# Patient Record
Sex: FEMALE | Race: WHITE | Employment: FULL TIME | URBAN - METROPOLITAN AREA
[De-identification: names, ages, dates, MRNs, and addresses within clinical notes are randomized per-mention and may not be internally consistent; named-entity substitution may affect disease eponyms.]

---

## 2017-01-14 ENCOUNTER — COMMUNICATION - HEALTHEAST (OUTPATIENT)
Dept: FAMILY MEDICINE | Facility: CLINIC | Age: 62
End: 2017-01-14

## 2017-01-14 DIAGNOSIS — E03.9 HYPOTHYROIDISM: ICD-10-CM

## 2017-01-14 DIAGNOSIS — N18.4 CHRONIC KIDNEY DISEASE, STAGE IV (SEVERE) (H): ICD-10-CM

## 2017-02-21 ENCOUNTER — OFFICE VISIT - HEALTHEAST (OUTPATIENT)
Dept: FAMILY MEDICINE | Facility: CLINIC | Age: 62
End: 2017-02-21

## 2017-02-21 ENCOUNTER — RECORDS - HEALTHEAST (OUTPATIENT)
Dept: ADMINISTRATIVE | Facility: OTHER | Age: 62
End: 2017-02-21

## 2017-02-21 DIAGNOSIS — Z00.00 HEALTHCARE MAINTENANCE: ICD-10-CM

## 2017-02-21 DIAGNOSIS — E78.5 HYPERLIPIDEMIA: ICD-10-CM

## 2017-02-21 DIAGNOSIS — N18.4 CHRONIC KIDNEY DISEASE, STAGE IV (SEVERE) (H): ICD-10-CM

## 2017-02-21 DIAGNOSIS — I10 ESSENTIAL HYPERTENSION: ICD-10-CM

## 2017-02-21 DIAGNOSIS — E03.9 HYPOTHYROIDISM: ICD-10-CM

## 2017-02-21 LAB
CHOLEST SERPL-MCNC: 238 MG/DL
FASTING STATUS PATIENT QL REPORTED: NO
HDLC SERPL-MCNC: 120 MG/DL
LDLC SERPL CALC-MCNC: 88 MG/DL
TRIGL SERPL-MCNC: 149 MG/DL

## 2017-02-21 ASSESSMENT — MIFFLIN-ST. JEOR: SCORE: 1148.38

## 2017-02-24 ENCOUNTER — COMMUNICATION - HEALTHEAST (OUTPATIENT)
Dept: FAMILY MEDICINE | Facility: CLINIC | Age: 62
End: 2017-02-24

## 2017-02-27 ENCOUNTER — COMMUNICATION - HEALTHEAST (OUTPATIENT)
Dept: FAMILY MEDICINE | Facility: CLINIC | Age: 62
End: 2017-02-27

## 2017-02-27 DIAGNOSIS — N18.4 CHRONIC KIDNEY DISEASE, STAGE IV (SEVERE) (H): ICD-10-CM

## 2017-02-27 DIAGNOSIS — E03.9 HYPOTHYROIDISM, UNSPECIFIED TYPE: ICD-10-CM

## 2017-03-14 ENCOUNTER — HOSPITAL ENCOUNTER (OUTPATIENT)
Dept: MAMMOGRAPHY | Facility: HOSPITAL | Age: 62
Discharge: HOME | End: 2017-03-14
Attending: FAMILY MEDICINE

## 2017-03-14 DIAGNOSIS — Z00.00 HEALTHCARE MAINTENANCE: ICD-10-CM

## 2017-03-22 ENCOUNTER — COMMUNICATION - HEALTHEAST (OUTPATIENT)
Dept: FAMILY MEDICINE | Facility: CLINIC | Age: 62
End: 2017-03-22

## 2017-04-11 ENCOUNTER — RECORDS - HEALTHEAST (OUTPATIENT)
Dept: ADMINISTRATIVE | Facility: OTHER | Age: 62
End: 2017-04-11

## 2017-04-17 ENCOUNTER — COMMUNICATION - HEALTHEAST (OUTPATIENT)
Dept: FAMILY MEDICINE | Facility: CLINIC | Age: 62
End: 2017-04-17

## 2017-04-17 DIAGNOSIS — E78.5 HYPERLIPIDEMIA: ICD-10-CM

## 2017-06-16 ENCOUNTER — COMMUNICATION - HEALTHEAST (OUTPATIENT)
Dept: FAMILY MEDICINE | Facility: CLINIC | Age: 62
End: 2017-06-16

## 2017-06-16 DIAGNOSIS — N18.4 CHRONIC KIDNEY DISEASE, STAGE IV (SEVERE) (H): ICD-10-CM

## 2017-06-16 DIAGNOSIS — E03.9 HYPOTHYROIDISM, UNSPECIFIED TYPE: ICD-10-CM

## 2017-08-29 ENCOUNTER — RECORDS - HEALTHEAST (OUTPATIENT)
Dept: ADMINISTRATIVE | Facility: OTHER | Age: 62
End: 2017-08-29

## 2017-09-17 ENCOUNTER — COMMUNICATION - HEALTHEAST (OUTPATIENT)
Dept: FAMILY MEDICINE | Facility: CLINIC | Age: 62
End: 2017-09-17

## 2017-09-17 DIAGNOSIS — N18.4 CHRONIC KIDNEY DISEASE, STAGE IV (SEVERE) (H): ICD-10-CM

## 2017-09-17 DIAGNOSIS — E03.9 HYPOTHYROIDISM, UNSPECIFIED TYPE: ICD-10-CM

## 2017-10-03 ENCOUNTER — COMMUNICATION - HEALTHEAST (OUTPATIENT)
Dept: FAMILY MEDICINE | Facility: CLINIC | Age: 62
End: 2017-10-03

## 2018-02-16 ENCOUNTER — COMMUNICATION - HEALTHEAST (OUTPATIENT)
Dept: FAMILY MEDICINE | Facility: CLINIC | Age: 63
End: 2018-02-16

## 2018-02-16 DIAGNOSIS — E03.9 HYPOTHYROIDISM, UNSPECIFIED TYPE: ICD-10-CM

## 2018-02-16 DIAGNOSIS — N18.4 CHRONIC KIDNEY DISEASE, STAGE IV (SEVERE) (H): ICD-10-CM

## 2018-03-07 ENCOUNTER — AMBULATORY - HEALTHEAST (OUTPATIENT)
Dept: LAB | Facility: CLINIC | Age: 63
End: 2018-03-07

## 2018-03-07 DIAGNOSIS — E03.9 HYPOTHYROIDISM, UNSPECIFIED TYPE: ICD-10-CM

## 2018-03-07 LAB — TSH SERPL DL<=0.005 MIU/L-ACNC: 2.24 UIU/ML (ref 0.3–5)

## 2018-03-09 ENCOUNTER — COMMUNICATION - HEALTHEAST (OUTPATIENT)
Dept: FAMILY MEDICINE | Facility: CLINIC | Age: 63
End: 2018-03-09

## 2018-03-26 ENCOUNTER — RECORDS - HEALTHEAST (OUTPATIENT)
Dept: ADMINISTRATIVE | Facility: OTHER | Age: 63
End: 2018-03-26

## 2018-04-10 ENCOUNTER — OFFICE VISIT - HEALTHEAST (OUTPATIENT)
Dept: FAMILY MEDICINE | Facility: CLINIC | Age: 63
End: 2018-04-10

## 2018-04-10 DIAGNOSIS — E78.00 PURE HYPERCHOLESTEROLEMIA: ICD-10-CM

## 2018-04-10 DIAGNOSIS — F17.200 NICOTINE DEPENDENCE: ICD-10-CM

## 2018-04-10 DIAGNOSIS — E03.9 HYPOTHYROIDISM: ICD-10-CM

## 2018-04-10 DIAGNOSIS — N18.9 CKD (CHRONIC KIDNEY DISEASE): ICD-10-CM

## 2018-04-10 DIAGNOSIS — I10 ESSENTIAL HYPERTENSION: ICD-10-CM

## 2018-04-10 ASSESSMENT — MIFFLIN-ST. JEOR: SCORE: 1186.1

## 2018-04-23 ENCOUNTER — COMMUNICATION - HEALTHEAST (OUTPATIENT)
Dept: FAMILY MEDICINE | Facility: CLINIC | Age: 63
End: 2018-04-23

## 2018-05-03 ENCOUNTER — COMMUNICATION - HEALTHEAST (OUTPATIENT)
Dept: FAMILY MEDICINE | Facility: CLINIC | Age: 63
End: 2018-05-03

## 2018-05-20 ENCOUNTER — COMMUNICATION - HEALTHEAST (OUTPATIENT)
Dept: FAMILY MEDICINE | Facility: CLINIC | Age: 63
End: 2018-05-20

## 2018-05-20 DIAGNOSIS — E03.9 HYPOTHYROIDISM, UNSPECIFIED TYPE: ICD-10-CM

## 2018-05-20 DIAGNOSIS — N18.4 CHRONIC KIDNEY DISEASE, STAGE IV (SEVERE) (H): ICD-10-CM

## 2018-07-17 ENCOUNTER — RECORDS - HEALTHEAST (OUTPATIENT)
Dept: ADMINISTRATIVE | Facility: OTHER | Age: 63
End: 2018-07-17

## 2019-02-20 ENCOUNTER — COMMUNICATION - HEALTHEAST (OUTPATIENT)
Dept: FAMILY MEDICINE | Facility: CLINIC | Age: 64
End: 2019-02-20

## 2019-03-08 ENCOUNTER — AMBULATORY - HEALTHEAST (OUTPATIENT)
Dept: SCHEDULING | Facility: CLINIC | Age: 64
End: 2019-03-08

## 2019-03-08 ENCOUNTER — OFFICE VISIT - HEALTHEAST (OUTPATIENT)
Dept: FAMILY MEDICINE | Facility: CLINIC | Age: 64
End: 2019-03-08

## 2019-03-08 DIAGNOSIS — Z00.00 ANNUAL PHYSICAL EXAM: ICD-10-CM

## 2019-03-08 DIAGNOSIS — F17.200 SMOKER: ICD-10-CM

## 2019-03-08 DIAGNOSIS — E83.52 SERUM CALCIUM ELEVATED: ICD-10-CM

## 2019-03-08 DIAGNOSIS — N18.4 CHRONIC KIDNEY DISEASE, STAGE IV (SEVERE) (H): ICD-10-CM

## 2019-03-08 DIAGNOSIS — Z12.31 VISIT FOR SCREENING MAMMOGRAM: ICD-10-CM

## 2019-03-08 DIAGNOSIS — R07.89 CHEST PAIN, MUSCULOSKELETAL: ICD-10-CM

## 2019-03-08 DIAGNOSIS — E78.5 HYPERLIPIDEMIA: ICD-10-CM

## 2019-03-08 DIAGNOSIS — E03.9 HYPOTHYROIDISM, UNSPECIFIED TYPE: ICD-10-CM

## 2019-03-08 LAB
25(OH)D3 SERPL-MCNC: 35.6 NG/ML (ref 30–80)
25(OH)D3 SERPL-MCNC: 35.6 NG/ML (ref 30–80)
ALBUMIN SERPL-MCNC: 4.2 G/DL (ref 3.5–5)
ALP SERPL-CCNC: 101 U/L (ref 45–120)
ALT SERPL W P-5'-P-CCNC: <9 U/L (ref 0–45)
ANION GAP SERPL CALCULATED.3IONS-SCNC: 12 MMOL/L (ref 5–18)
AST SERPL W P-5'-P-CCNC: 14 U/L (ref 0–40)
BILIRUB SERPL-MCNC: 0.3 MG/DL (ref 0–1)
BUN SERPL-MCNC: 37 MG/DL (ref 8–22)
CALCIUM SERPL-MCNC: 11 MG/DL (ref 8.5–10.5)
CHLORIDE BLD-SCNC: 109 MMOL/L (ref 98–107)
CHOLEST SERPL-MCNC: 332 MG/DL
CO2 SERPL-SCNC: 23 MMOL/L (ref 22–31)
CREAT SERPL-MCNC: 1.88 MG/DL (ref 0.6–1.1)
FASTING STATUS PATIENT QL REPORTED: YES
GFR SERPL CREATININE-BSD FRML MDRD: 27 ML/MIN/1.73M2
GLUCOSE BLD-MCNC: 99 MG/DL (ref 70–125)
HDLC SERPL-MCNC: 91 MG/DL
LDLC SERPL CALC-MCNC: 218 MG/DL
POTASSIUM BLD-SCNC: 5 MMOL/L (ref 3.5–5)
PROT SERPL-MCNC: 7.3 G/DL (ref 6–8)
SODIUM SERPL-SCNC: 144 MMOL/L (ref 136–145)
TRIGL SERPL-MCNC: 114 MG/DL
TSH SERPL DL<=0.005 MIU/L-ACNC: 3.44 UIU/ML (ref 0.3–5)

## 2019-03-08 ASSESSMENT — MIFFLIN-ST. JEOR: SCORE: 1270.39

## 2019-03-11 LAB
HPV SOURCE: NORMAL
HUMAN PAPILLOMA VIRUS 16 DNA: NEGATIVE
HUMAN PAPILLOMA VIRUS 18 DNA: NEGATIVE
HUMAN PAPILLOMA VIRUS FINAL DIAGNOSIS: NORMAL
HUMAN PAPILLOMA VIRUS OTHER HR: NEGATIVE
SPECIMEN DESCRIPTION: NORMAL

## 2019-03-14 LAB
BKR LAB AP ABNORMAL BLEEDING: NO
BKR LAB AP BIRTH CONTROL/HORMONES: NORMAL
BKR LAB AP CERVICAL APPEARANCE: NORMAL
BKR LAB AP GYN ADEQUACY: NORMAL
BKR LAB AP GYN INTERPRETATION: NORMAL
BKR LAB AP HPV REFLEX: NORMAL
BKR LAB AP LMP: NORMAL
BKR LAB AP PATIENT STATUS: NO
BKR LAB AP PREVIOUS ABNORMAL: NO
BKR LAB AP PREVIOUS NORMAL: 2013
HIGH RISK?: YES
PATH REPORT.COMMENTS IMP SPEC: NORMAL
RESULT FLAG (HE HISTORICAL CONVERSION): NORMAL

## 2019-04-16 ENCOUNTER — AMBULATORY - HEALTHEAST (OUTPATIENT)
Dept: LAB | Facility: CLINIC | Age: 64
End: 2019-04-16

## 2019-04-16 DIAGNOSIS — E83.52 SERUM CALCIUM ELEVATED: ICD-10-CM

## 2019-04-16 LAB
CALCIUM SERPL-MCNC: 10.5 MG/DL (ref 8.5–10.5)
PTH-INTACT SERPL-MCNC: 52 PG/ML (ref 10–86)

## 2019-04-26 ENCOUNTER — RECORDS - HEALTHEAST (OUTPATIENT)
Dept: ADMINISTRATIVE | Facility: OTHER | Age: 64
End: 2019-04-26

## 2019-05-23 ENCOUNTER — COMMUNICATION - HEALTHEAST (OUTPATIENT)
Dept: FAMILY MEDICINE | Facility: CLINIC | Age: 64
End: 2019-05-23

## 2019-05-31 ENCOUNTER — OFFICE VISIT - HEALTHEAST (OUTPATIENT)
Dept: FAMILY MEDICINE | Facility: CLINIC | Age: 64
End: 2019-05-31

## 2019-05-31 ENCOUNTER — HOSPITAL ENCOUNTER (OUTPATIENT)
Dept: MAMMOGRAPHY | Facility: CLINIC | Age: 64
Discharge: HOME | End: 2019-05-31

## 2019-05-31 ENCOUNTER — RECORDS - HEALTHEAST (OUTPATIENT)
Dept: GENERAL RADIOLOGY | Facility: CLINIC | Age: 64
End: 2019-05-31

## 2019-05-31 DIAGNOSIS — R22.9 LOCALIZED SUPERFICIAL SWELLING, MASS, OR LUMP: ICD-10-CM

## 2019-05-31 DIAGNOSIS — R22.9 LOCALIZED SWELLING, MASS AND LUMP, UNSPECIFIED: ICD-10-CM

## 2019-05-31 DIAGNOSIS — Z12.31 VISIT FOR SCREENING MAMMOGRAM: ICD-10-CM
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
## 2019-05-31 ASSESSMENT — MIFFLIN-ST. JEOR: SCORE: 1230.48

## 2019-06-03 ENCOUNTER — COMMUNICATION - HEALTHEAST (OUTPATIENT)
Dept: FAMILY MEDICINE | Facility: CLINIC | Age: 64
End: 2019-06-03

## 2019-06-05 ENCOUNTER — COMMUNICATION - HEALTHEAST (OUTPATIENT)
Dept: FAMILY MEDICINE | Facility: CLINIC | Age: 64
End: 2019-06-05

## 2019-06-07 ENCOUNTER — HOSPITAL ENCOUNTER (OUTPATIENT)
Dept: ULTRASOUND IMAGING | Facility: HOSPITAL | Age: 64
Discharge: HOME | End: 2019-06-07

## 2019-06-07 DIAGNOSIS — R22.9 LOCALIZED SUPERFICIAL SWELLING, MASS, OR LUMP: ICD-10-CM
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
## 2019-06-10 ENCOUNTER — COMMUNICATION - HEALTHEAST (OUTPATIENT)
Dept: FAMILY MEDICINE | Facility: CLINIC | Age: 64
End: 2019-06-10

## 2019-06-10 DIAGNOSIS — S30.0XXS TRAUMATIC HEMATOMA OF BUTTOCK, SEQUELA: ICD-10-CM

## 2019-06-17 ENCOUNTER — OFFICE VISIT - HEALTHEAST (OUTPATIENT)
Dept: SURGERY | Facility: CLINIC | Age: 64
End: 2019-06-17

## 2019-06-17 DIAGNOSIS — R22.9 LUMP OF SKIN: ICD-10-CM

## 2019-06-17 ASSESSMENT — MIFFLIN-ST. JEOR: SCORE: 1211.43

## 2019-06-20 ASSESSMENT — MIFFLIN-ST. JEOR: SCORE: 1211.43

## 2019-06-25 ENCOUNTER — ANESTHESIA - HEALTHEAST (OUTPATIENT)
Dept: SURGERY | Facility: AMBULATORY SURGERY CENTER | Age: 64
End: 2019-06-25

## 2019-06-25 ENCOUNTER — SURGERY - HEALTHEAST (OUTPATIENT)
Dept: SURGERY | Facility: AMBULATORY SURGERY CENTER | Age: 64
End: 2019-06-25

## 2019-07-02 ENCOUNTER — COMMUNICATION - HEALTHEAST (OUTPATIENT)
Dept: SURGERY | Facility: CLINIC | Age: 64
End: 2019-07-02

## 2019-07-31 ENCOUNTER — AMBULATORY - HEALTHEAST (OUTPATIENT)
Dept: SURGERY | Facility: CLINIC | Age: 64
End: 2019-07-31

## 2019-10-08 ENCOUNTER — COMMUNICATION - HEALTHEAST (OUTPATIENT)
Dept: FAMILY MEDICINE | Facility: CLINIC | Age: 64
End: 2019-10-08

## 2019-10-08 DIAGNOSIS — I10 ESSENTIAL HYPERTENSION: ICD-10-CM

## 2019-10-08 DIAGNOSIS — E03.9 HYPOTHYROIDISM, UNSPECIFIED TYPE: ICD-10-CM

## 2019-10-08 DIAGNOSIS — N18.4 CHRONIC KIDNEY DISEASE, STAGE IV (SEVERE) (H): ICD-10-CM

## 2019-10-08 DIAGNOSIS — E78.5 HYPERLIPIDEMIA: ICD-10-CM

## 2019-11-08 ENCOUNTER — HEALTH MAINTENANCE LETTER (OUTPATIENT)
Age: 64
End: 2019-11-08

## 2020-02-23 ENCOUNTER — HEALTH MAINTENANCE LETTER (OUTPATIENT)
Age: 65
End: 2020-02-23

## 2020-05-08 ENCOUNTER — RECORDS - HEALTHEAST (OUTPATIENT)
Dept: ADMINISTRATIVE | Facility: OTHER | Age: 65
End: 2020-05-08

## 2020-06-05 ENCOUNTER — RECORDS - HEALTHEAST (OUTPATIENT)
Dept: ADMINISTRATIVE | Facility: OTHER | Age: 65
End: 2020-06-05

## 2020-06-29 ENCOUNTER — RECORDS - HEALTHEAST (OUTPATIENT)
Dept: ADMINISTRATIVE | Facility: OTHER | Age: 65
End: 2020-06-29

## 2020-07-01 ENCOUNTER — RECORDS - HEALTHEAST (OUTPATIENT)
Dept: ADMINISTRATIVE | Facility: OTHER | Age: 65
End: 2020-07-01

## 2020-07-02 ENCOUNTER — COMMUNICATION - HEALTHEAST (OUTPATIENT)
Dept: FAMILY MEDICINE | Facility: CLINIC | Age: 65
End: 2020-07-02

## 2020-07-02 ENCOUNTER — RECORDS - HEALTHEAST (OUTPATIENT)
Dept: ADMINISTRATIVE | Facility: OTHER | Age: 65
End: 2020-07-02

## 2020-07-02 DIAGNOSIS — E78.5 HYPERLIPIDEMIA: ICD-10-CM

## 2020-07-13 ENCOUNTER — COMMUNICATION - HEALTHEAST (OUTPATIENT)
Dept: FAMILY MEDICINE | Facility: CLINIC | Age: 65
End: 2020-07-13

## 2020-07-13 DIAGNOSIS — N18.4 CHRONIC KIDNEY DISEASE, STAGE IV (SEVERE) (H): ICD-10-CM

## 2020-07-13 DIAGNOSIS — E03.9 HYPOTHYROIDISM, UNSPECIFIED TYPE: ICD-10-CM

## 2020-07-14 ENCOUNTER — COMMUNICATION - HEALTHEAST (OUTPATIENT)
Dept: FAMILY MEDICINE | Facility: CLINIC | Age: 65
End: 2020-07-14

## 2020-07-16 ENCOUNTER — RECORDS - HEALTHEAST (OUTPATIENT)
Dept: ADMINISTRATIVE | Facility: OTHER | Age: 65
End: 2020-07-16

## 2020-07-22 ENCOUNTER — RECORDS - HEALTHEAST (OUTPATIENT)
Dept: ADMINISTRATIVE | Facility: OTHER | Age: 65
End: 2020-07-22

## 2020-07-27 ENCOUNTER — AMBULATORY - HEALTHEAST (OUTPATIENT)
Dept: SURGERY | Facility: CLINIC | Age: 65
End: 2020-07-27

## 2020-07-27 DIAGNOSIS — Z11.59 ENCOUNTER FOR SCREENING FOR OTHER VIRAL DISEASES: ICD-10-CM

## 2020-09-03 ASSESSMENT — MIFFLIN-ST. JEOR: SCORE: 1229

## 2020-09-04 ENCOUNTER — OFFICE VISIT - HEALTHEAST (OUTPATIENT)
Dept: FAMILY MEDICINE | Facility: CLINIC | Age: 65
End: 2020-09-04

## 2020-09-04 DIAGNOSIS — E78.2 MIXED HYPERLIPIDEMIA: ICD-10-CM

## 2020-09-04 DIAGNOSIS — I70.1 BILATERAL RENAL ARTERY STENOSIS (H): ICD-10-CM

## 2020-09-04 DIAGNOSIS — R56.9 CONVULSIONS, UNSPECIFIED CONVULSION TYPE (H): ICD-10-CM

## 2020-09-04 DIAGNOSIS — E03.9 HYPOTHYROIDISM, UNSPECIFIED TYPE: ICD-10-CM

## 2020-09-04 DIAGNOSIS — N18.4 CHRONIC KIDNEY DISEASE, STAGE IV (SEVERE) (H): ICD-10-CM

## 2020-09-04 DIAGNOSIS — Z01.818 PREOP GENERAL PHYSICAL EXAM: ICD-10-CM

## 2020-09-04 LAB
ALBUMIN SERPL-MCNC: 4 G/DL (ref 3.5–5)
ALP SERPL-CCNC: 52 U/L (ref 45–120)
ALT SERPL W P-5'-P-CCNC: <9 U/L (ref 0–45)
ANION GAP SERPL CALCULATED.3IONS-SCNC: 14 MMOL/L (ref 5–18)
AST SERPL W P-5'-P-CCNC: 14 U/L (ref 0–40)
BASOPHILS # BLD AUTO: 0.1 THOU/UL (ref 0–0.2)
BASOPHILS NFR BLD AUTO: 1 % (ref 0–2)
BILIRUB SERPL-MCNC: 0.3 MG/DL (ref 0–1)
BUN SERPL-MCNC: 26 MG/DL (ref 8–22)
CALCIUM SERPL-MCNC: 9.9 MG/DL (ref 8.5–10.5)
CHLORIDE BLD-SCNC: 109 MMOL/L (ref 98–107)
CHOLEST SERPL-MCNC: 198 MG/DL
CO2 SERPL-SCNC: 20 MMOL/L (ref 22–31)
CREAT SERPL-MCNC: 1.97 MG/DL (ref 0.6–1.1)
EOSINOPHIL # BLD AUTO: 0.3 THOU/UL (ref 0–0.4)
EOSINOPHIL NFR BLD AUTO: 3 % (ref 0–6)
ERYTHROCYTE [DISTWIDTH] IN BLOOD BY AUTOMATED COUNT: 14.2 % (ref 11–14.5)
FASTING STATUS PATIENT QL REPORTED: YES
GFR SERPL CREATININE-BSD FRML MDRD: 26 ML/MIN/1.73M2
GLUCOSE BLD-MCNC: 92 MG/DL (ref 70–125)
HCT VFR BLD AUTO: 41.9 % (ref 35–47)
HDLC SERPL-MCNC: 91 MG/DL
HGB BLD-MCNC: 13.9 G/DL (ref 12–16)
LDLC SERPL CALC-MCNC: 77 MG/DL
LYMPHOCYTES # BLD AUTO: 2 THOU/UL (ref 0.8–4.4)
LYMPHOCYTES NFR BLD AUTO: 25 % (ref 20–40)
MCH RBC QN AUTO: 33.6 PG (ref 27–34)
MCHC RBC AUTO-ENTMCNC: 33.3 G/DL (ref 32–36)
MCV RBC AUTO: 101 FL (ref 80–100)
MONOCYTES # BLD AUTO: 0.5 THOU/UL (ref 0–0.9)
MONOCYTES NFR BLD AUTO: 6 % (ref 2–10)
NEUTROPHILS # BLD AUTO: 5.4 THOU/UL (ref 2–7.7)
NEUTROPHILS NFR BLD AUTO: 65 % (ref 50–70)
PLATELET # BLD AUTO: 231 THOU/UL (ref 140–440)
PMV BLD AUTO: 7.9 FL (ref 7–10)
POTASSIUM BLD-SCNC: 3.6 MMOL/L (ref 3.5–5)
PROT SERPL-MCNC: 6.6 G/DL (ref 6–8)
RBC # BLD AUTO: 4.14 MILL/UL (ref 3.8–5.4)
SODIUM SERPL-SCNC: 143 MMOL/L (ref 136–145)
TRIGL SERPL-MCNC: 148 MG/DL
TSH SERPL DL<=0.005 MIU/L-ACNC: 1.12 UIU/ML (ref 0.3–5)
WBC: 8.2 THOU/UL (ref 4–11)

## 2020-09-04 ASSESSMENT — MIFFLIN-ST. JEOR: SCORE: 1190.06

## 2020-09-11 ENCOUNTER — AMBULATORY - HEALTHEAST (OUTPATIENT)
Dept: FAMILY MEDICINE | Facility: CLINIC | Age: 65
End: 2020-09-11

## 2020-09-11 DIAGNOSIS — Z11.59 ENCOUNTER FOR SCREENING FOR OTHER VIRAL DISEASES: ICD-10-CM

## 2020-09-12 ENCOUNTER — ANESTHESIA - HEALTHEAST (OUTPATIENT)
Dept: SURGERY | Facility: CLINIC | Age: 65
End: 2020-09-12

## 2020-09-13 ENCOUNTER — COMMUNICATION - HEALTHEAST (OUTPATIENT)
Dept: SCHEDULING | Facility: CLINIC | Age: 65
End: 2020-09-13

## 2020-09-14 ENCOUNTER — SURGERY - HEALTHEAST (OUTPATIENT)
Dept: SURGERY | Facility: CLINIC | Age: 65
End: 2020-09-14

## 2020-09-14 ASSESSMENT — MIFFLIN-ST. JEOR: SCORE: 1189.54

## 2020-09-28 ENCOUNTER — COMMUNICATION - HEALTHEAST (OUTPATIENT)
Dept: FAMILY MEDICINE | Facility: CLINIC | Age: 65
End: 2020-09-28

## 2020-09-28 DIAGNOSIS — E78.2 MIXED HYPERLIPIDEMIA: ICD-10-CM

## 2020-10-21 ENCOUNTER — COMMUNICATION - HEALTHEAST (OUTPATIENT)
Dept: FAMILY MEDICINE | Facility: CLINIC | Age: 65
End: 2020-10-21

## 2020-10-21 DIAGNOSIS — N18.4 CHRONIC KIDNEY DISEASE, STAGE IV (SEVERE) (H): ICD-10-CM

## 2020-10-21 DIAGNOSIS — E03.9 HYPOTHYROIDISM, UNSPECIFIED TYPE: ICD-10-CM

## 2020-10-22 ENCOUNTER — COMMUNICATION - HEALTHEAST (OUTPATIENT)
Dept: FAMILY MEDICINE | Facility: CLINIC | Age: 65
End: 2020-10-22

## 2020-10-22 DIAGNOSIS — N18.4 CHRONIC KIDNEY DISEASE, STAGE IV (SEVERE) (H): ICD-10-CM

## 2020-10-22 DIAGNOSIS — E03.9 HYPOTHYROIDISM, UNSPECIFIED TYPE: ICD-10-CM

## 2020-10-29 ENCOUNTER — COMMUNICATION - HEALTHEAST (OUTPATIENT)
Dept: FAMILY MEDICINE | Facility: CLINIC | Age: 65
End: 2020-10-29

## 2020-11-02 ENCOUNTER — RECORDS - HEALTHEAST (OUTPATIENT)
Dept: ADMINISTRATIVE | Facility: OTHER | Age: 65
End: 2020-11-02

## 2020-11-04 ENCOUNTER — AMBULATORY - HEALTHEAST (OUTPATIENT)
Dept: SURGERY | Facility: CLINIC | Age: 65
End: 2020-11-04

## 2020-11-04 DIAGNOSIS — Z11.59 ENCOUNTER FOR SCREENING FOR OTHER VIRAL DISEASES: ICD-10-CM

## 2020-11-27 ENCOUNTER — OFFICE VISIT - HEALTHEAST (OUTPATIENT)
Dept: FAMILY MEDICINE | Facility: CLINIC | Age: 65
End: 2020-11-27

## 2020-11-27 DIAGNOSIS — N18.4 CHRONIC KIDNEY DISEASE, STAGE IV (SEVERE) (H): ICD-10-CM

## 2020-11-27 DIAGNOSIS — I10 ESSENTIAL HYPERTENSION: ICD-10-CM

## 2020-11-27 DIAGNOSIS — Z01.818 PREOP GENERAL PHYSICAL EXAM: ICD-10-CM

## 2020-11-27 LAB
ANION GAP SERPL CALCULATED.3IONS-SCNC: 13 MMOL/L (ref 5–18)
BASOPHILS # BLD AUTO: 0 THOU/UL (ref 0–0.2)
BASOPHILS NFR BLD AUTO: 1 % (ref 0–2)
BUN SERPL-MCNC: 27 MG/DL (ref 8–22)
CALCIUM SERPL-MCNC: 9.2 MG/DL (ref 8.5–10.5)
CHLORIDE BLD-SCNC: 112 MMOL/L (ref 98–107)
CO2 SERPL-SCNC: 19 MMOL/L (ref 22–31)
CREAT SERPL-MCNC: 1.7 MG/DL (ref 0.6–1.1)
EOSINOPHIL # BLD AUTO: 0.1 THOU/UL (ref 0–0.4)
EOSINOPHIL NFR BLD AUTO: 2 % (ref 0–6)
ERYTHROCYTE [DISTWIDTH] IN BLOOD BY AUTOMATED COUNT: 11.3 % (ref 11–14.5)
GFR SERPL CREATININE-BSD FRML MDRD: 30 ML/MIN/1.73M2
GLUCOSE BLD-MCNC: 85 MG/DL (ref 70–125)
HCT VFR BLD AUTO: 40.5 % (ref 35–47)
HGB BLD-MCNC: 13.3 G/DL (ref 12–16)
LYMPHOCYTES # BLD AUTO: 2.3 THOU/UL (ref 0.8–4.4)
LYMPHOCYTES NFR BLD AUTO: 39 % (ref 20–40)
MCH RBC QN AUTO: 31.7 PG (ref 27–34)
MCHC RBC AUTO-ENTMCNC: 32.7 G/DL (ref 32–36)
MCV RBC AUTO: 97 FL (ref 80–100)
MONOCYTES # BLD AUTO: 0.4 THOU/UL (ref 0–0.9)
MONOCYTES NFR BLD AUTO: 6 % (ref 2–10)
NEUTROPHILS # BLD AUTO: 3.2 THOU/UL (ref 2–7.7)
NEUTROPHILS NFR BLD AUTO: 53 % (ref 50–70)
PLATELET # BLD AUTO: 230 THOU/UL (ref 140–440)
PMV BLD AUTO: 8.2 FL (ref 7–10)
POTASSIUM BLD-SCNC: 3.6 MMOL/L (ref 3.5–5)
RBC # BLD AUTO: 4.18 MILL/UL (ref 3.8–5.4)
SODIUM SERPL-SCNC: 144 MMOL/L (ref 136–145)
WBC: 6.1 THOU/UL (ref 4–11)

## 2020-11-27 ASSESSMENT — MIFFLIN-ST. JEOR: SCORE: 1171.91

## 2020-11-30 ENCOUNTER — AMBULATORY - HEALTHEAST (OUTPATIENT)
Dept: SURGERY | Facility: CLINIC | Age: 65
End: 2020-11-30

## 2020-11-30 DIAGNOSIS — Z11.59 ENCOUNTER FOR SCREENING FOR OTHER VIRAL DISEASES: ICD-10-CM

## 2020-11-30 ASSESSMENT — MIFFLIN-ST. JEOR: SCORE: 1165.5

## 2020-12-01 ENCOUNTER — AMBULATORY - HEALTHEAST (OUTPATIENT)
Dept: LAB | Facility: CLINIC | Age: 65
End: 2020-12-01

## 2020-12-01 DIAGNOSIS — Z11.59 ENCOUNTER FOR SCREENING FOR OTHER VIRAL DISEASES: ICD-10-CM

## 2020-12-02 LAB
SARS-COV-2 PCR COMMENT: NORMAL
SARS-COV-2 RNA SPEC QL NAA+PROBE: NEGATIVE
SARS-COV-2 VIRUS SPECIMEN SOURCE: NORMAL

## 2020-12-03 ENCOUNTER — COMMUNICATION - HEALTHEAST (OUTPATIENT)
Dept: SCHEDULING | Facility: CLINIC | Age: 65
End: 2020-12-03

## 2020-12-03 ENCOUNTER — SURGERY - HEALTHEAST (OUTPATIENT)
Dept: SURGERY | Facility: CLINIC | Age: 65
End: 2020-12-03

## 2020-12-03 ENCOUNTER — ANESTHESIA - HEALTHEAST (OUTPATIENT)
Dept: SURGERY | Facility: CLINIC | Age: 65
End: 2020-12-03

## 2020-12-03 ASSESSMENT — MIFFLIN-ST. JEOR: SCORE: 1169.58

## 2020-12-06 ENCOUNTER — HEALTH MAINTENANCE LETTER (OUTPATIENT)
Age: 65
End: 2020-12-06

## 2021-01-18 ENCOUNTER — RECORDS - HEALTHEAST (OUTPATIENT)
Dept: ADMINISTRATIVE | Facility: OTHER | Age: 66
End: 2021-01-18

## 2021-03-27 ENCOUNTER — AMBULATORY - HEALTHEAST (OUTPATIENT)
Dept: NURSING | Facility: CLINIC | Age: 66
End: 2021-03-27

## 2021-04-17 ENCOUNTER — AMBULATORY - HEALTHEAST (OUTPATIENT)
Dept: NURSING | Facility: CLINIC | Age: 66
End: 2021-04-17

## 2021-05-07 ENCOUNTER — RECORDS - HEALTHEAST (OUTPATIENT)
Dept: ADMINISTRATIVE | Facility: OTHER | Age: 66
End: 2021-05-07

## 2021-05-29 NOTE — TELEPHONE ENCOUNTER
Who is calling:  Patient  Reason for Call:  Patient received my chart message regarding  X-ray of left hip.  Pt requesting to speak to MD to get plan of care and  referral to General surgeon. Please call patient on her cell [phone]   Date of last appointment with primary care: 5/13/19   Has the patient been recently seen:  Yes  Okay to leave a detailed message: Yes      [phone] Pt  cell       XR Pelvis W 2 Vw Hip Left (Order #[id number]) on 5/31/2019 - Order Result History Report   Patient Result Comments     Viewed by Leticia Valiente on 6/3/2019  8:29 AM   Written by Mary Jane Gonzales MD on 6/2/2019  6:23 PM   Leticia,     The radiology read of the x-ray does not show any fracture or dislocation of pelvis or hip.  I still do recommend that you follow with surgery for further options.     Mary Jane Gonzales MD  6/2/2019

## 2021-05-29 NOTE — TELEPHONE ENCOUNTER
Please inform patient that I am going to refer her to surgery but before that we should get an ultrasound also done.    This has been ordered for her.    Mary Jane Gonzales MD  6/3/2019

## 2021-05-29 NOTE — TELEPHONE ENCOUNTER
Who is calling:  Patient  Reason for Call:  Patient stated she would like a call back with either a recommendation or a referral to a surgeon. Patient stated she does not know where to go for the lump on that was noted on her x-ray. Patient stated she wanted to schedule this as soon as possible.  Date of last appointment with primary care: 5/31/19  Okay to leave a detailed message: Yes  [phone]

## 2021-05-29 NOTE — PROGRESS NOTES
Assessment:     1. Fall, initial encounter  XR Pelvis W 2 Vw Hip Left    US Aspiration or Injection Major Joint   2. Localized superficial swelling, mass, or lump  XR Pelvis W 2 Vw Hip Left    US Aspiration or Injection Major Joint     .  X-ray reviewed by me preliminary and does not show any acute fractures.  However, I am going to wait for a formal read also.  In addition I would like to get a soft tissue ultrasound of the hip.  To see if this is consistent with hematoma.  This is very uncomfortable to the patient and for the referral to surgery will be done after ultrasound evaluation.     Plan:      The diagnosis was discussed with the patient and evaluation and treatment plans outlined.  Further follow-up plans will be based on outcome of lab/imaging studies; see orders.     Subjective:      Leticia Valiente is a  female who presents for evaluation of   Chief Complaint   Patient presents with     Mass     Fell on buttocks and has had a lump/bruise for 5 weeks     Patient fell on her buttocks around 5 weeks ago.  She shows me pictures of her back with extensive bruising.  This happened in her garage steps.  She informs me that her grafts steps a slippery and she was just wearing a sock and landed on her back.  She was able to control her pain with OTC medication and ice packs.  Subsequently she started going back to work.  Then she noticed that her swelling was becoming more localized and it appears that it was going more afterwards.  She thinks there is a hematoma on her upper gluteal area on the left side.  This is extremely painful.  She is unable to lay on her left.  She kept thinking that this would eventually resolve and has now come for an evaluation.  She is able to walk and do her daily activities.  She denies any restriction or range of motion problem.  Mostly this tenderness and discomfort.    She denies the knowledge of any lump in her gluteal area in the past.      The following portions of the  patient's history were reviewed and updated as appropriate: allergies, current medications, past family history, past medical history, past social history, past surgical history and problem list.  No Known Allergies    Current Outpatient Medications on File Prior to Visit   Medication Sig Dispense Refill     amLODIPine (NORVASC) 5 MG tablet Take 1 tablet (5 mg total) by mouth daily. 30 tablet 6     atorvastatin (LIPITOR) 40 MG tablet TAKE ONE TABLET BY MOUTH ONCE DAILY 30 tablet 6     levothyroxine (SYNTHROID, LEVOTHROID) 112 MCG tablet Take 1 tablet (112 mcg total) by mouth Daily at 6:00 am. 30 tablet 6     lisinopril (PRINIVIL,ZESTRIL) 5 MG tablet Take 1 tablet (5 mg total) by mouth daily. 30 tablet 6     metoprolol succinate (TOPROL-XL) 50 MG 24 hr tablet   0     nicotine (NICOTROL) 10 mg inhaler Inhale 1 puff as needed for smoking cessation. 1 Cartridge 1     No current facility-administered medications on file prior to visit.        Patient Active Problem List   Diagnosis     Hypertension     Chronic Kidney Disease, Stage 4     Osteopenia     Iatrogenic Hypotension     Hypothyroidism     Renal Artery Stenosis     Bilateral renal artery stenosis (H)     Convulsions (H)     Encounter for screening for cardiovascular disorders     ETOH abuse     Graves' disease     Tobacco dependence       No past medical history on file.    No past surgical history on file.    Family History   Problem Relation Age of Onset     Breast cancer Cousin         maternal     Breast cancer Cousin         maternal     Pancreatic cancer Cousin      Stroke Mother 90     ALS Father        Social History     Socioeconomic History     Marital status: Single     Spouse name: None     Number of children: None     Years of education: None     Highest education level: None   Occupational History     None   Social Needs     Financial resource strain: None     Food insecurity:     Worry: None     Inability: None     Transportation needs:      "Medical: None     Non-medical: None   Tobacco Use     Smoking status: Current Every Day Smoker     Packs/day: 0.25     Types: Cigarettes     Smokeless tobacco: Never Used     Tobacco comment: smokes 5-6 cigarettes per day, also using 5-6 mL nicotine in ecig   Substance and Sexual Activity     Alcohol use: Yes     Frequency: 2-4 times a month     Drinks per session: 3 or 4     Binge frequency: Less than monthly     Drug use: No     Sexual activity: Yes     Partners: Male     Birth control/protection: None   Lifestyle     Physical activity:     Days per week: None     Minutes per session: None     Stress: None   Relationships     Social connections:     Talks on phone: None     Gets together: None     Attends Worship service: None     Active member of club or organization: None     Attends meetings of clubs or organizations: None     Relationship status: None     Intimate partner violence:     Fear of current or ex partner: None     Emotionally abused: None     Physically abused: None     Forced sexual activity: None   Other Topics Concern     None   Social History Narrative        Lives with Boyfriend. Works at [employer]. Has 3 adult children and grand children. Lives in [place].        Mary Jane Gonzales MD  3/8/2019         Review of Systems  Constitutional: negative  Integument/breast: negative  Hematologic/lymphatic: negative  Neurological: negative       Objective:   /79   Pulse 80   Temp 98.1  F (36.7  C)   Ht 5' 3.75\" (1.619 m)   Wt 154 lb 3.2 oz (69.9 kg)   BMI 26.68 kg/m        General Appearance: healthy, alert, oriented and no distress  MSK: Range of motion and hip joint is adequate.  Muscle strength is good.  Reflexes are normal.  Examination of the area of concern shows a about a 7 x 10 cm lump mass which is subcutaneous and slightly fluctuant.  The bruising that is present in the pictures is now resolved.  This area is diffusely tender to palpation.    "

## 2021-05-29 NOTE — TELEPHONE ENCOUNTER
Reason contacted:  Message from PCP  Information relayed:  Informed patient of Dr. Gonzales's message below. Pt voiced understanding.  Additional questions:  No  Further follow-up needed:  No

## 2021-05-29 NOTE — PATIENT INSTRUCTIONS - HE
Will check Hip xray and US to see if it is hematoma.    Further referral to Surgery will be considered.      Mary Jane Gonzales MD  5/31/2019

## 2021-05-29 NOTE — TELEPHONE ENCOUNTER
----- Message from Mary Jane Gonzales MD sent at 5/22/2019  5:24 PM CDT -----  Please update health maintenance.    Mary Jane Gonzales MD  5/22/2019    Team, I am also sending this to abstract quality pool

## 2021-05-29 NOTE — TELEPHONE ENCOUNTER
I saw the patient was on the schedule this afternoon for a preop prior to ultrasound-guided hematoma aspiration.  I spoke with radiology to ensure they understood why she is coming in.  They are aware that this is an ultrasound for hematoma, and they would plan to aspirate if it appears amenable to that.  They stated that the patient would only need a preoperative exam if she would need sedation, which they can provide, but which would be unusual to be needed with this type of procedure.      We (Deb JERNIGAN MA) spoke to patient by phone, and patient does not anticipate any difficulty tolerating the procedure in anyway, and she would like to cancel preoperative exam. She understands that if sedation is needed, she will not be able to complete the procedure at that visit.

## 2021-05-29 NOTE — PROGRESS NOTES
HPI: Leticia Valiente is a 63 y.o. female referred to see me by Mary Jane Gonzales MD for a lump in the gluteal region.  She notes  a several month history of the lump and states that it occurred after she fell approximately 2 months ago.  She landed on the left side of her backside and developed a fairly large hematoma.  Over the past several weeks it has slowly coalesced into a firm nodule that is causing her discomfort.  She underwent an ultrasound which did not demonstrate any drainable fluid collection but did demonstrate hematoma. She denies any nausea, vomiting, fevers, chills or any other symptoms at present.       Allergies:Patient has no known allergies.    No past medical history on file.    No past surgical history on file.    CURRENT MEDS:    Current Outpatient Medications:      amLODIPine (NORVASC) 5 MG tablet, Take 1 tablet (5 mg total) by mouth daily., Disp: 30 tablet, Rfl: 6     aspirin 81 MG EC tablet, Take 81 mg by mouth daily., Disp: , Rfl:      atorvastatin (LIPITOR) 40 MG tablet, TAKE ONE TABLET BY MOUTH ONCE DAILY, Disp: 30 tablet, Rfl: 6     levothyroxine (SYNTHROID, LEVOTHROID) 112 MCG tablet, Take 1 tablet (112 mcg total) by mouth Daily at 6:00 am., Disp: 30 tablet, Rfl: 6     lisinopril (PRINIVIL,ZESTRIL) 5 MG tablet, Take 1 tablet (5 mg total) by mouth daily., Disp: 30 tablet, Rfl: 6     metoprolol succinate (TOPROL-XL) 25 MG, Take 25 mg by mouth daily., Disp: , Rfl: 0     nicotine (NICOTROL) 10 mg inhaler, Inhale 1 puff as needed for smoking cessation., Disp: 1 Cartridge, Rfl: 1    Family History   Problem Relation Age of Onset     Breast cancer Cousin         maternal     Breast cancer Cousin         maternal     Pancreatic cancer Cousin      Stroke Mother 90     ALS Father         reports that she has been smoking cigarettes.  She has been smoking about 0.25 packs per day. She has never used smokeless tobacco. She reports that she drinks alcohol. She reports that she does not use  "drugs.    Review of Systems:  The 12 system review is within normal limits except for as mentioned above.  General ROS: No complaints or constitutional symptoms  Ophthalmic ROS: No complaints of visual changes  Skin: As per HPI  Endocrine: No complaints or symptoms  Hematologic/Lymphatic: No symptoms or complaints  Psychiatric: No symptoms or complaints  Respiratory ROS: no cough, shortness of breath, or wheezing  Cardiovascular ROS: no chest pain or dyspnea on exertion  Gastrointestinal ROS: No complaints of pain or other symptoms  Genito-Urinary ROS: no dysuria, trouble voiding, or hematuria  Musculoskeletal ROS: no joint or muscle pain  Neurological ROS: no TIA or stroke symptoms      EXAM:  /74 (Patient Site: Right Arm, Patient Position: Sitting, Cuff Size: Adult Large)   Pulse 64   Ht 5' 3.75\" (1.619 m)   Wt 150 lb (68 kg)   SpO2 98%   Breastfeeding? No   BMI 25.95 kg/m    GENERAL: Well developed female, No acute distress, pleasant and conversant   EYES: Pupils equal, round and reactive, no scleral icterus  CARDIAC: Regular rate and rhythm, no obvious murmurs noted  CHEST/LUNG: Clear to ascultation bilaterally, No ronchi, No wheezes  ABDOMEN: Soft, non tender, no masses  SKIN: Pink, warm and dry-left gluteal region-5 x 7 cm firm nodular, tender to palpation  NEURO:No focal deficits, ambulatory  MUSCULOSKELETAL:No obvious deformities, no swelling, normal appearing      LABS:  Lab Results   Component Value Date    WBC 7.7 01/25/2016    HGB 14.8 01/25/2016    HCT 43.4 01/25/2016    MCV 99 01/25/2016     01/25/2016     INR/Prothrombin Time      Lab Results   Component Value Date    ALT <9 03/08/2019    AST 14 03/08/2019    ALKPHOS 101 03/08/2019    BILITOT 0.3 03/08/2019       IMAGES:   Relevant images were reviewed and discussed with the patient.  Notable findings were:   EXAM: US LEG NON VASCULAR LEFT  LOCATION: [facility]  DATE/TIME: 6/7/2019 3:11 PM     INDICATION: NEED US OF LEFT " GLUTEUS FOR LARGE LUMP 7 x 10 cm, presumed hematoma  COMPARISON: None.  TECHNIQUE: Routine.     FINDINGS: There is a 7 x 5 x 2 cm hematoma in the left superior plaques. No drainable fluid. No solid mass.       Assessment/Plan:   Leticia Valiente is a 63 y.o. female with signs and symptoms consistent with either a persistent hematoma or necrotic adipose tissue.  I have explained the pathophysiology of these entities in detail as well as the surgical versus non-operative management strategies.  I explained to her that hematomas usually resolve over time but it may take some time for it becomes asymptomatic.  I recommended observation but also discussed the options of excision.  After discussion she elected to proceed the excisional route because of the symptoms she is having.    The risks of surgery were discussed in detail which include, but are not limited to, bleeding, infection, blood clots, stroke, heart attack and death.  Additionally, the risks of non operative management were discussed which include, but are not limited to,enlargin of the mass, infection and pain.      She understands everything which was discussed and has consented to proceed with an excision of the mass.  We will schedule surgery accordingly.       Tadeo Childers D.O. [facility]  [phone]  [facility] Department of Surgery

## 2021-05-30 VITALS — WEIGHT: 136.1 LBS | BODY MASS INDEX: 23.23 KG/M2 | HEIGHT: 64 IN

## 2021-05-30 NOTE — ANESTHESIA PREPROCEDURE EVALUATION
Anesthesia Evaluation        Airway   Mallampati: II  Neck ROM: full   Pulmonary - normal exam   (+) a smoker (quit 1 year ago)                         Cardiovascular - normal exam  Exercise tolerance: > or = 4 METS  (+) hypertension well controlled, ,      Neuro/Psych      Comments: EhOH abuse      Endo/Other    (+) hypothyroidism,      GI/Hepatic/Renal    (+)   chronic renal disease (CKD IV - stable) CRI,           Dental    (+) edentulous                       Anesthesia Plan  Planned anesthetic: MAC  Versed/fentanyl/propofol  Ketamine PRN  Decadron/zofran      ASA 3   Induction: intravenous   Anesthetic plan and risks discussed with: patient and spouse  Anesthesia plan special considerations: antiemetics,   Post-op plan: routine recovery        Chemistry        Component Value Date/Time     03/08/2019 0910    K 5.0 03/08/2019 0910     (H) 03/08/2019 0910    CO2 23 03/08/2019 0910    BUN 37 (H) 03/08/2019 0910    CREATININE 1.88 (H) 03/08/2019 0910    GLU 99 03/08/2019 0910        Component Value Date/Time    CALCIUM 10.5 04/16/2019 0858    ALKPHOS 101 03/08/2019 0910    AST 14 03/08/2019 0910    ALT <9 03/08/2019 0910    BILITOT 0.3 03/08/2019 0910        Lab Results   Component Value Date    WBC 7.7 01/25/2016    HGB 14.8 01/25/2016    HCT 43.4 01/25/2016    MCV 99 01/25/2016     01/25/2016

## 2021-05-30 NOTE — ANESTHESIA CARE TRANSFER NOTE
Last vitals:   Vitals:    06/25/19 1304   BP: (!) 79/50   Pulse: 64   Resp: 18   Temp: 36.6  C (97.8  F)   SpO2: 97%     Pt brought to phase 2 on room air. Monitors applied. VSS.    Patient's level of consciousness is drowsy  Spontaneous respirations: yes  Maintains airway independently: yes  Dentition unchanged: yes  Oropharynx: oropharynx clear of all foreign objects    QCDR Measures:  ASA# 20 - Surgical Safety Checklist: WHO surgical safety checklist completed prior to induction    PQRS# 430 - Adult PONV Prevention: 4558F - Pt received => 2 anti-emetic agents (different classes) preop & intraop  ASA# 8 - Peds PONV Prevention: NA - Not pediatric patient, not GA or 2 or more risk factors NOT present  PQRS# 424 - Jossie-op Temp Management: NA - MAC anesthesia or case < 60 minutes  PQRS# 426 - PACU Transfer Protocol: - Transfer of care checklist used  ASA# 14 - Acute Post-op Pain: ASA14B - Patient did NOT experience pain >= 7 out of 10

## 2021-05-30 NOTE — TELEPHONE ENCOUNTER
Called and left message for pt asking if she would like to re-schedule her post op appointment today due to provider in surgery.

## 2021-05-30 NOTE — ANESTHESIA POSTPROCEDURE EVALUATION
Patient: Leticia Valiente  EXCISION OF LEFT GLUTEAL MASS  Anesthesia type: MAC    Patient location: Phase II Recovery  Last vitals:   Vitals Value Taken Time   BP 97/60 6/25/2019  1:15 PM   Temp 36.6  C (97.8  F) 6/25/2019  1:04 PM   Pulse 72 6/25/2019  1:18 PM   Resp 18 6/25/2019  1:04 PM   SpO2 98 % 6/25/2019  1:18 PM   Vitals shown include unvalidated device data.  Post vital signs: stable  Level of consciousness: awake and responds to simple questions  Post-anesthesia pain: pain controlled  Post-anesthesia nausea and vomiting: no  Pulmonary: unassisted, return to baseline  Cardiovascular: stable and blood pressure at baseline  Hydration: adequate  Anesthetic events: no    QCDR Measures:  ASA# 11 - Jossie-op Cardiac Arrest: ASA11B - Patient did NOT experience unanticipated cardiac arrest  ASA# 12 - Jossie-op Mortality Rate: ASA12B - Patient did NOT die  ASA# 13 - PACU Re-Intubation Rate: NA - No ETT / LMA used for case  ASA# 10 - Composite Anes Safety: ASA10A - No serious adverse event    Additional Notes:

## 2021-06-01 VITALS — BODY MASS INDEX: 24.75 KG/M2 | HEIGHT: 64 IN | WEIGHT: 145 LBS

## 2021-06-02 VITALS — BODY MASS INDEX: 27.83 KG/M2 | WEIGHT: 163 LBS | HEIGHT: 64 IN

## 2021-06-02 NOTE — TELEPHONE ENCOUNTER
Refill Request  Did you contact pharmacy: Yes.  Date: 10/4/19  Medication name:   Requested Prescriptions     Pending Prescriptions Disp Refills     amLODIPine (NORVASC) 5 MG tablet 30 tablet 6     Sig: Take 1 tablet (5 mg total) by mouth daily.     aspirin 81 MG EC tablet  0     Sig: Take 1 tablet (81 mg total) by mouth daily.     levothyroxine (SYNTHROID, LEVOTHROID) 112 MCG tablet 30 tablet 6     Sig: Take 1 tablet (112 mcg total) by mouth Daily at 6:00 am.     lisinopril (PRINIVIL,ZESTRIL) 5 MG tablet 30 tablet 6     Sig: Take 1 tablet (5 mg total) by mouth daily.     Who prescribed the medication: Dr. Gonzales  [facility]arm[facility] Name and Location: [facility]  Is patient out of medication: Yes  Patient notified refills processed in 72 hours:  yes  Okay to leave a detailed message: yes    Patient is asking for a refill until she can be seen.

## 2021-06-02 NOTE — TELEPHONE ENCOUNTER
Since they were abnormal, they need to be checked again and medications managed.    If patient prefers we can do it next year    Mary Jane Gonzales MD  10/10/2019

## 2021-06-02 NOTE — TELEPHONE ENCOUNTER
Orders being requested: Lipid Cascade, Thryoid Cascade, Metabolic Panel  Reason service is needed/diagnosis: Patient was told to follow-up in 6 months. Patient normally follows-up every year. Explained patient was to have lab work re-checked. Patient is asking if she can have labs done at [facility] in [place] instead.  When are orders needed by: As soon as possible  Where to send Orders: Fax: [phone] [facility]  Okay to leave detailed message?  Yes, [phone]

## 2021-06-02 NOTE — TELEPHONE ENCOUNTER
Refill Approved    Rx's renewed per Medication Renewal Policy. Medications  last renewed on 3/8/2019.  Last OV 5/31/2019.    Ludmila Gayle, [facility] Triage/Med Refill 10/8/2019     Requested Prescriptions   Pending Prescriptions Disp Refills     amLODIPine (NORVASC) 5 MG tablet 30 tablet 6     Sig: Take 1 tablet (5 mg total) by mouth daily.       Calcium-Channel Blockers Protocol Passed - 10/8/2019 12:32 PM        Passed - PCP or prescribing provider visit in past 12 months or next 3 months     Last office visit with prescriber/PCP: 5/31/2019 Mary Jane Gonzales MD OR same dept: 5/31/2019 Mary Jane Gonzales MD OR same specialty: 5/31/2019 Mary Jane Gonzales MD  Last physical: 3/8/2019 Last MTM visit: Visit date not found   Next visit within 3 mo: Visit date not found  Next physical within 3 mo: Visit date not found  Prescriber OR PCP: Mary Jane Gonzales MD  Last diagnosis associated with med order: 1. Hypothyroidism, unspecified type  - levothyroxine (SYNTHROID, LEVOTHROID) 112 MCG tablet; Take 1 tablet (112 mcg total) by mouth Daily at 6:00 am.  Dispense: 30 tablet; Refill: 6    2. Chronic Kidney Disease, Stage 4  - levothyroxine (SYNTHROID, LEVOTHROID) 112 MCG tablet; Take 1 tablet (112 mcg total) by mouth Daily at 6:00 am.  Dispense: 30 tablet; Refill: 6    3. Hyperlipidemia  - atorvastatin (LIPITOR) 40 MG tablet; TAKE ONE TABLET BY MOUTH ONCE DAILY  Dispense: 30 tablet; Refill: 6    If protocol passes may refill for 12 months if within 3 months of last provider visit (or a total of 15 months).             Passed - Blood pressure filed in past 12 months     BP Readings from Last 1 Encounters:   06/25/19 108/67             levothyroxine (SYNTHROID, LEVOTHROID) 112 MCG tablet 30 tablet 6     Sig: Take 1 tablet (112 mcg total) by mouth Daily at 6:00 am.       Thyroid Hormones Protocol Passed - 10/8/2019 12:32 PM        Passed - Provider visit in past 12 months or next 3 months     Last office visit with  prescriber/PCP: 5/31/2019 Mary Jane Gonzales MD OR same dept: 5/31/2019 Mary Jane Gonzales MD OR same specialty: 5/31/2019 Mary Jane Gonzales MD  Last physical: 3/8/2019 Last MTM visit: Visit date not found   Next visit within 3 mo: Visit date not found  Next physical within 3 mo: Visit date not found  Prescriber OR PCP: Mary Jane Gonzales MD  Last diagnosis associated with med order: 1. Hypothyroidism, unspecified type  - levothyroxine (SYNTHROID, LEVOTHROID) 112 MCG tablet; Take 1 tablet (112 mcg total) by mouth Daily at 6:00 am.  Dispense: 30 tablet; Refill: 6    2. Chronic Kidney Disease, Stage 4  - levothyroxine (SYNTHROID, LEVOTHROID) 112 MCG tablet; Take 1 tablet (112 mcg total) by mouth Daily at 6:00 am.  Dispense: 30 tablet; Refill: 6    3. Hyperlipidemia  - atorvastatin (LIPITOR) 40 MG tablet; TAKE ONE TABLET BY MOUTH ONCE DAILY  Dispense: 30 tablet; Refill: 6    If protocol passes may refill for 12 months if within 3 months of last provider visit (or a total of 15 months).             Passed - TSH on file in past 12 months for patient age 12 & older     TSH   Date Value Ref Range Status   03/08/2019 3.44 0.30 - 5.00 uIU/mL Final                   lisinopril (PRINIVIL,ZESTRIL) 5 MG tablet 30 tablet 6     Sig: Take 1 tablet (5 mg total) by mouth daily.       Ace Inhibitors Refill Protocol Passed - 10/8/2019 12:32 PM        Passed - PCP or prescribing provider visit in past 12 months       Last office visit with prescriber/PCP: 5/31/2019 Mary Jane Gonzales MD OR same dept: 5/31/2019 Mary Jane Gonzales MD OR same specialty: 5/31/2019 Mary Jane Gonzales MD  Last physical: 3/8/2019 Last MTM visit: Visit date not found   Next visit within 3 mo: Visit date not found  Next physical within 3 mo: Visit date not found  Prescriber OR PCP: Mary Jane Gonzales MD  Last diagnosis associated with med order: 1. Hypothyroidism, unspecified type  - levothyroxine (SYNTHROID, LEVOTHROID) 112 MCG tablet; Take 1 tablet (112 mcg  total) by mouth Daily at 6:00 am.  Dispense: 30 tablet; Refill: 6    2. Chronic Kidney Disease, Stage 4  - levothyroxine (SYNTHROID, LEVOTHROID) 112 MCG tablet; Take 1 tablet (112 mcg total) by mouth Daily at 6:00 am.  Dispense: 30 tablet; Refill: 6    3. Hyperlipidemia  - atorvastatin (LIPITOR) 40 MG tablet; TAKE ONE TABLET BY MOUTH ONCE DAILY  Dispense: 30 tablet; Refill: 6    If protocol passes may refill for 12 months if within 3 months of last provider visit (or a total of 15 months).             Passed - Serum Potassium in past 12 months     Lab Results   Component Value Date    Potassium 5.0 03/08/2019             Passed - Blood pressure filed in past 12 months     BP Readings from Last 1 Encounters:   06/25/19 108/67             Passed - Serum Creatinine in past 12 months     Creatinine   Date Value Ref Range Status   03/08/2019 1.88 (H) 0.60 - 1.10 mg/dL Final             atorvastatin (LIPITOR) 40 MG tablet 30 tablet 6     Sig: TAKE ONE TABLET BY MOUTH ONCE DAILY       Statins Refill Protocol (Hmg CoA Reductase Inhibitors) Passed - 10/8/2019 12:32 PM        Passed - PCP or prescribing provider visit in past 12 months      Last office visit with prescriber/PCP: 5/31/2019 Mary Jane Gonzales MD OR same dept: 5/31/2019 Mary Jane Gonzales MD OR same specialty: 5/31/2019 Mary Jane Gonzales MD  Last physical: 3/8/2019 Last MTM visit: Visit date not found   Next visit within 3 mo: Visit date not found  Next physical within 3 mo: Visit date not found  Prescriber OR PCP: Mary Jane Gonzales MD  Last diagnosis associated with med order: 1. Hypothyroidism, unspecified type  - levothyroxine (SYNTHROID, LEVOTHROID) 112 MCG tablet; Take 1 tablet (112 mcg total) by mouth Daily at 6:00 am.  Dispense: 30 tablet; Refill: 6    2. Chronic Kidney Disease, Stage 4  - levothyroxine (SYNTHROID, LEVOTHROID) 112 MCG tablet; Take 1 tablet (112 mcg total) by mouth Daily at 6:00 am.  Dispense: 30 tablet; Refill: 6    3. Hyperlipidemia  -  atorvastatin (LIPITOR) 40 MG tablet; TAKE ONE TABLET BY MOUTH ONCE DAILY  Dispense: 30 tablet; Refill: 6    If protocol passes may refill for 12 months if within 3 months of last provider visit (or a total of 15 months).

## 2021-06-03 VITALS — WEIGHT: 154.2 LBS | HEIGHT: 64 IN | BODY MASS INDEX: 26.32 KG/M2

## 2021-06-03 VITALS — WEIGHT: 150 LBS | BODY MASS INDEX: 25.61 KG/M2 | HEIGHT: 64 IN

## 2021-06-03 VITALS — BODY MASS INDEX: 25.61 KG/M2 | WEIGHT: 150 LBS | HEIGHT: 64 IN

## 2021-06-04 VITALS
BODY MASS INDEX: 24.86 KG/M2 | HEART RATE: 76 BPM | WEIGHT: 145.6 LBS | HEIGHT: 64 IN | RESPIRATION RATE: 16 BRPM | SYSTOLIC BLOOD PRESSURE: 125 MMHG | OXYGEN SATURATION: 99 % | DIASTOLIC BLOOD PRESSURE: 82 MMHG

## 2021-06-04 VITALS — WEIGHT: 144.3 LBS | HEIGHT: 64 IN | BODY MASS INDEX: 24.63 KG/M2

## 2021-06-05 VITALS
BODY MASS INDEX: 24.17 KG/M2 | WEIGHT: 141.6 LBS | HEIGHT: 64 IN | OXYGEN SATURATION: 100 % | DIASTOLIC BLOOD PRESSURE: 86 MMHG | HEART RATE: 78 BPM | SYSTOLIC BLOOD PRESSURE: 137 MMHG

## 2021-06-05 VITALS — HEIGHT: 64 IN | WEIGHT: 139.9 LBS | BODY MASS INDEX: 23.88 KG/M2

## 2021-06-09 NOTE — TELEPHONE ENCOUNTER
RN cannot approve Refill Request    RN can NOT refill this medication Protocol failed and NO refill given.       Leticia Massey, [facility] Triage/Med Refill 7/3/2020    Requested Prescriptions   Pending Prescriptions Disp Refills     atorvastatin (LIPITOR) 40 MG tablet 90 tablet 2     Sig: TAKE ONE TABLET BY MOUTH ONCE DAILY       Statins Refill Protocol (Hmg CoA Reductase Inhibitors) Failed - 7/2/2020  5:13 PM        Failed - PCP or prescribing provider visit in past 12 months      Last office visit with prescriber/PCP: 5/31/2019 Mary Jane Gonzales MD OR same dept: Visit date not found OR same specialty: 5/31/2019 Mary Jane Gonzales MD  Last physical: 3/8/2019 Last MTM visit: Visit date not found   Next visit within 3 mo: Visit date not found  Next physical within 3 mo: Visit date not found  Prescriber OR PCP: Mary Jane Gonzales MD  Last diagnosis associated with med order: 1. Hyperlipidemia  - atorvastatin (LIPITOR) 40 MG tablet; TAKE ONE TABLET BY MOUTH ONCE DAILY  Dispense: 90 tablet; Refill: 2    If protocol passes may refill for 12 months if within 3 months of last provider visit (or a total of 15 months).

## 2021-06-09 NOTE — TELEPHONE ENCOUNTER
Please inform patient that I have refilled her Lipitor.  She is due for physical and annual check with labs.    Mary Jane Gonzales MD  7/3/2020

## 2021-06-09 NOTE — PROGRESS NOTES
"SUBJECTIVE:   Chief Complaint   Patient presents with     Thyroid Problem     Med Check and Labs     Leticia Valiente 61 y.o. female    Current Outpatient Prescriptions   Medication Sig Dispense Refill     amLODIPine (NORVASC) 5 MG tablet        atorvastatin (LIPITOR) 40 MG tablet TAKE ONE TABLET BY MOUTH ONCE DAILY 90 tablet 0     levothyroxine (SYNTHROID, LEVOTHROID) 100 MCG tablet TAKE ONE TABLET BY MOUTH ONCE DAILY (NEED  APPOINTMENT  FOR  FUTURE  REFILLS) 90 tablet 0     metoprolol tartrate (LOPRESSOR) 25 MG tablet        No current facility-administered medications for this visit.      Allergies: Review of patient's allergies indicates no known allergies.   No LMP recorded. Patient is postmenopausal.    HPI:   Barbi 61-year-old here for med check.  Has stage IV chronic kidney disease, follows with nephrology.  Her hypertension is treated with amlodipine 5 mg daily, metoprolol tartrate 25 mg twice daily.  Hyperlipidemia treated with atorvastatin 40 mg daily.  Hypothyroidism treated with levothyroxine 100 mg daily.  Feeling well.  No concerns.    ROS: as per HPI    OBJECTIVE:   Visit Vitals     /68 (Patient Site: Left Arm, Patient Position: Sitting, Cuff Size: Adult Regular)     Pulse 72     Temp 98.7  F (37.1  C) (Oral)     Resp 16     Ht 5' 3.75\" (1.619 m)     Wt 136 lb 1.6 oz (61.7 kg)     BMI 23.55 kg/m2       General: Pleasant, well-appearing, in no acute distress.  HEENT: Pupils equal, round, reactive to light.  Oropharynx clear with moist mucous membranes.   Neck supple without lymphadenopathy.  No carotid bruits.  Cardiovascular: Heart regular rate and rhythm, normal S1-S2, no murmurs, rubs, or gallops  Respiratory: Lungs are clear to auscultation bilaterally without wheezes or crackles.  Good air movement throughout.  No increased work of breathing.  Abdomen: Soft, nontender, nondistended.  No guarding or rebound.  Extremities: Warm and well perfused without edema  Skin: No jaundice or " jannette.      ASSESSMENT/PLAN  1. Healthcare maintenance  - Mammo Screening Bilateral; Future  She was notified she is due for a Pap and should return at her convenience for a physical.  She would like to start A and B immunization series today.    2. Hypothyroidism  Labs today, follow based on results.  - Thyroid Cascade    3. Chronic Kidney Disease, Stage 4  Continues to follow with nephrology at [facility].  Was just there today for her office visit.  Renal function stable, creatinine 2.16, EGFR 24.    4. Hyperlipidemia  - Lipid Cascade    5.  Hypertension  Adequately controlled on current medication regimen.  Labs performed last week through nephrology reviewed

## 2021-06-09 NOTE — TELEPHONE ENCOUNTER
Requested Prescriptions     Pending Prescriptions Disp Refills     levothyroxine (SYNTHROID, LEVOTHROID) 112 MCG tablet 90 tablet 2     Sig: Take 1 tablet (112 mcg total) by mouth Daily at 6:00 am.

## 2021-06-09 NOTE — TELEPHONE ENCOUNTER
RN cannot approve Refill Request    RN can NOT refill this medication Protocol failed and NO refill given. Last office visit: 5/31/2019 Mary Jane Gonzales MD Last Physical: 3/8/2019 Last MTM visit: Visit date not found Last visit same specialty: 5/31/2019 Mary Jane Gonzales MD.  Next visit within 3 mo: Visit date not found  Next physical within 3 mo: Visit date not found      Leticia Massey, [facility] Triage/Med Refill 7/13/2020    Requested Prescriptions   Pending Prescriptions Disp Refills     levothyroxine (SYNTHROID, LEVOTHROID) 112 MCG tablet 90 tablet 2     Sig: Take 1 tablet (112 mcg total) by mouth Daily at 6:00 am.       Thyroid Hormones Protocol Failed - 7/13/2020 12:06 PM        Failed - Provider visit in past 12 months or next 3 months     Last office visit with prescriber/PCP: 5/31/2019 Mary Jane Gonzales MD OR same dept: Visit date not found OR same specialty: 5/31/2019 Mary Jane Gonzales MD  Last physical: 3/8/2019 Last MTM visit: Visit date not found   Next visit within 3 mo: Visit date not found  Next physical within 3 mo: Visit date not found  Prescriber OR PCP: Mary Jane Gonzales MD  Last diagnosis associated with med order: 1. Hypothyroidism, unspecified type  - levothyroxine (SYNTHROID, LEVOTHROID) 112 MCG tablet; Take 1 tablet (112 mcg total) by mouth Daily at 6:00 am.  Dispense: 90 tablet; Refill: 2    2. Chronic Kidney Disease, Stage 4  - levothyroxine (SYNTHROID, LEVOTHROID) 112 MCG tablet; Take 1 tablet (112 mcg total) by mouth Daily at 6:00 am.  Dispense: 90 tablet; Refill: 2    If protocol passes may refill for 12 months if within 3 months of last provider visit (or a total of 15 months).             Failed - TSH on file in past 12 months for patient age 12 & older     TSH   Date Value Ref Range Status   03/08/2019 3.44 0.30 - 5.00 uIU/mL Final

## 2021-06-11 NOTE — PROGRESS NOTES
[facility]  [address] [facility] [address]  Dept: [phone]  Dept Fax: [phone]  Primary Provider: Orlin Gonzales MD  Pre-op Performing Provider: ORLIN GONZALES    PREOPERATIVE EVALUATION:  Today's date: 9/4/2020    Leticia Valiente is a 65 y.o. female who presents for a preoperative evaluation.    Surgical Information:  Surgery Details 9/4/2020   Surgery/Procedure: RT Total knee arthroplasty   Surgery Location: [facility]   Surgeon: Dr Mcneil   Surgery Date: 9/14/2020   Time of Surgery: TBD   Where patient plans to recover: at home with family     Fax number for surgical facility: Note does not need to be faxed, will be available electronically in Epic.  Type of Anesthesia Anticipated: General    Subjective     HPI related to upcoming procedure:  Worsening Knee pain    Preop Questions 9/4/2020   Have you ever had a heart attack or stroke? No   Have you ever had surgery on your heart or blood vessels, such as a stent placement, a coronary artery bypass, or surgery on an artery in your head, neck, heart, or legs? No   Do you have chest pain with activity? No   Do you have a history of  heart failure? No   Do you currently have a cold, bronchitis or symptoms of other infection? No   Do you have a cough, shortness of breath, or wheezing? No   Do you or anyone in your family have previous history of blood clots? No   Do you or does anyone in your family have a serious bleeding problem such as prolonged bleeding following surgeries or cuts? No   Have you ever had problems with anemia or been told to take iron pills? No   Have you had any abnormal blood loss such as black, tarry or bloody stools, or abnormal vaginal bleeding? No   Have you ever had a blood transfusion? No   Are you willing to have a blood transfusion if it is medically needed before, during, or after your surgery? Yes   Have you or any of your relatives ever had problems with anesthesia? No   Do you have sleep apnea,  excessive snoring or daytime drowsiness? No   Do you have any artifical heart valves or other implanted medical devices like a pacemaker, defibrillator, or continuous glucose monitor? No   Do you have artificial joints? No   Are you allergic to latex? No   Is there any chance that you may be pregnant? No         Patient does not have a Health Care Directive or Living Will: Discussed advance care planning with patient; information given to patient to review.    RX [place] ([place] reviewed: No concerns    HYPERLIPIDEMIA - Patient has a long history of significant Hyperlipidemia requiring medication for treatment with recent unable to assess control. Patient reports no problems or side effects with the medication.     HYPERTENSION - Patient has longstanding history of HTN , currently denies any symptoms referable to elevated blood pressure. Specifically denies chest pain, palpitations, dyspnea, orthopnea, PND or peripheral edema. Blood pressure readings have been in normal range. Current medication regimen is as listed below. Patient denies any side effects of medication.     HYPOTHYROIDISM - Patient has a longstanding history of chronic Hypothyroidism. Patient has been doing well, noting no tremor, insomnia, hair loss or changes in skin texture. Continues to take medications as directed, without adverse reactions or side effects. Last TSH   Lab Results   Component Value Date    TSH 3.44 03/08/2019   .      RENAL INSUFFICIENCY - Patient has a longstanding history of moderate-severe chronic renal insufficiency. Last Cr No components found for: CRCL       Review of Systems  CONSTITUTIONAL: NEGATIVE for fever, chills, change in weight  INTEGUMENTARY/SKIN: NEGATIVE for worrisome rashes, moles or lesions  EYES: NEGATIVE for vision changes or irritation  ENT/MOUTH: NEGATIVE for ear, mouth and throat problems  RESP: NEGATIVE for significant cough or SOB  BREAST: NEGATIVE for masses, tenderness or discharge  CV:  NEGATIVE for chest pain, palpitations or peripheral edema  GI: NEGATIVE for nausea, abdominal pain, heartburn, or change in bowel habits  : NEGATIVE for frequency, dysuria, or hematuria  NEURO: NEGATIVE for weakness, dizziness or paresthesias  ENDOCRINE: NEGATIVE for temperature intolerance, skin/hair changes  HEME/ALLERGY/IMMUNE: NEGATIVE for bleeding problems and POSITIVE for easy brusing  PSYCHIATRIC: NEGATIVE for changes in mood or affect    Patient Active Problem List    Diagnosis Date Noted     Hematoma of muscle      Convulsions (H) 03/08/2019     Hypertension      Chronic Kidney Disease, Stage 4      Osteopenia      Iatrogenic Hypotension      Hypothyroidism      Renal Artery Stenosis      Bilateral renal artery stenosis (H) 02/08/2012     Encounter for screening for cardiovascular disorders 10/31/2010     ETOH abuse 09/21/2008     Graves' disease 06/18/2007     Tobacco dependence 06/18/2007     Past Medical History:   Diagnosis Date     Chronic kidney disease     stage 4 kidney disease     Disease of thyroid gland      Hypertension      Past Surgical History:   Procedure Laterality Date     EYE SURGERY       KIDNEY STONE SURGERY       TONSILLECTOMY       VULVA / PERINEUM BIOPSY Left 6/25/2019    Procedure: EXCISION OF LEFT GLUTEAL MASS;  Surgeon: Tadeo Childers DO;  Location: [facility];  Service: General     Current Outpatient Medications   Medication Sig Dispense Refill     amLODIPine (NORVASC) 5 MG tablet Take 1 tablet (5 mg total) by mouth daily. 90 tablet 2     atorvastatin (LIPITOR) 40 MG tablet TAKE ONE TABLET BY MOUTH ONCE DAILY 90 tablet 0     levothyroxine (SYNTHROID, LEVOTHROID) 112 MCG tablet Take 1 tablet (112 mcg total) by mouth Daily at 6:00 am. 90 tablet 0     lisinopril (PRINIVIL,ZESTRIL) 5 MG tablet Take 1 tablet (5 mg total) by mouth daily. 90 tablet 2     metoprolol succinate (TOPROL-XL) 25 MG Take 25 mg by mouth daily.  0     No current facility-administered medications for  "this visit.        No Known Allergies    Social History     Tobacco Use     Smoking status: Current Every Day Smoker     Packs/day: 0.25     Types: Cigarettes     Smokeless tobacco: Never Used     Tobacco comment: smokes 2 cigarettes per day, also using 5-6 mL nicotine in ecig   Substance Use Topics     Alcohol use: Not Currently     Frequency: 2-4 times a month     Drinks per session: 3 or 4     Binge frequency: Less than monthly      Family History   Problem Relation Age of Onset     Breast cancer Cousin         maternal     Breast cancer Cousin         maternal     Pancreatic cancer Cousin      Stroke Mother 90     ALS Father      Social History     Substance and Sexual Activity   Drug Use No           Objective   /82 (Patient Site: Left Arm, Patient Position: Sitting, Cuff Size: Adult Regular)   Pulse 76   Resp 16   Ht 5' 3.66\" (1.617 m)   Wt 145 lb 9.6 oz (66 kg)   SpO2 99%   BMI 25.26 kg/m    Physical Exam    GENERAL APPEARANCE: healthy, alert and no distress     EYES: EOMI, PERRL     HENT: ear canals and TM's normal and nose and mouth without ulcers or lesions     NECK: no adenopathy, no asymmetry, masses, or scars and thyroid normal to palpation     RESP: lungs clear to auscultation - no rales, rhonchi or wheezes     CV: regular rates and rhythm, normal S1 S2, no S3 or S4 and no murmur, click or rub     ABDOMEN:  soft, nontender, no HSM or masses and bowel sounds normal     SKIN: no suspicious lesions or rashes     NEURO: Normal strength and tone, sensory exam grossly normal, mentation intact and speech normal     PSYCH: mentation appears normal. and affect normal/bright     LYMPHATICS: No cervical adenopathy    Recent Labs   Lab Test 09/04/20  1120 03/08/19  0910   HGB 13.9  --      --    NA  --  144   K  --  5.0   CREATININE  --  1.88*        PRE-OP Diagnostics:   Labs pending at this time. Results will be reviewed when available.  No EKG required, no history of coronary heart disease, " significant arrhythmia, peripheral arterial disease or other structural heart disease.         Assessment & Plan       The proposed surgical procedure is considered INTERMEDIATE risk.    REVISED CARDIAC RISK INDEX   The patient has the following serious cardiovascular risks for perioperative complications:  No serious cardiac risks = 0 points    INTERPRETATION: 0 points: Class I (very low risk - 0.4% complication rate)    1. Preop general physical exam     2. Chronic kidney disease, stage IV (severe) (H)  Comprehensive Metabolic Panel    HM1(CBC and Differential)    HM1 (CBC with Diff)   3. Bilateral renal artery stenosis (H)     4. Convulsions, unspecified convulsion type (H)     5. Hypothyroidism, unspecified type  Thyroid Cascade   6. Mixed hyperlipidemia  Lipid Cascade RANDOM         The patient has the following additional risks and recommendations for perioperative complications:      CARDIOVASCULAR:  Due to the fact that she has hypercholesterolemia and smoking.  Was initiated on statin last year.   -Able to bicycle for 1 hour.  Walking is difficult due to knee issues.    ANEMIA/BLEEDING/CLOTTING:    -History of bruising and hematoma after fall.  CBC pending today     MEDICATION INSTRUCTIONS:  Patient is to take all scheduled medications on the day of surgery.  This is due to the fact she takes lisinopril at night.    NICOTINE Replacement:   - NICOTINE Gum: Take up to two hours before surgery    NSAIDS:   - IBUPROFEN (Advil, Motrin): HOLD 1 day before surgery.     RECOMMENDATION:  APPROVAL GIVEN to proceed with proposed procedure pending review of diagnostic evaluation.    No follow-ups on file.    Signed Electronically by: Mary Jane Gonzales MD    Copy of this evaluation report is provided to requesting physician.    [facility] Preop Guidelines    Revised Cardiac Risk Index

## 2021-06-11 NOTE — ANESTHESIA PREPROCEDURE EVALUATION
Anesthesia Evaluation      Patient summary reviewed   No history of anesthetic complications     Airway   Mallampati: II   Pulmonary - normal exam                          Cardiovascular - normal exam  (+) hypertension, ,      Neuro/Psych    (+) neuromuscular disease,      Endo/Other    (+) hypothyroidism, hyperthyroidism,      GI/Hepatic/Renal    (+)   chronic renal disease,           Dental - normal exam                        Anesthesia Plan  Planned anesthetic: spinal and peripheral nerve block    ASA 2     Anesthetic plan and risks discussed with: patient  Anesthesia plan special considerations: antiemetics,   Post-op plan: routine recovery

## 2021-06-11 NOTE — PATIENT INSTRUCTIONS - HE

## 2021-06-11 NOTE — ANESTHESIA CARE TRANSFER NOTE
Last vitals:   Vitals:    09/14/20 0946   BP: (!) 83/53   Pulse: 76   Resp: 20   Temp:    SpO2: 95%   T 98.6 F  Patient's level of consciousness is awake  Spontaneous respirations: yes  Maintains airway independently: yes  Dentition unchanged: yes  Oropharynx: oropharynx clear of all foreign objects    QCDR Measures:  ASA# 20 - Surgical Safety Checklist: WHO surgical safety checklist completed prior to induction    PQRS# 430 - Adult PONV Prevention: 4558F-8P - Pt did NOT receive => 2 anti-emetic agents  ASA# 8 - Peds PONV Prevention: NA - Not pediatric patient, not GA or 2 or more risk factors NOT present  PQRS# 424 - Jossie-op Temp Management: 4559F - At least one body temp DOCUMENTED => 35.5C or 95.9F within required timeframe  PQRS# 426 - PACU Transfer Protocol: - Transfer of care checklist used  ASA# 14 - Acute Post-op Pain: ASA14B - Patient did NOT experience pain >= 7 out of 10

## 2021-06-11 NOTE — ANESTHESIA PROCEDURE NOTES
Spinal Block    Patient location during procedure: OR  Start time: 9/14/2020 7:34 AM  End time: 9/14/2020 7:36 AM  Reason for block: primary anesthetic    Staffing:  Performing  Anesthesiologist: Mariana Lester MD    Preanesthetic Checklist  Completed: patient identified, risks, benefits, and alternatives discussed, timeout performed, consent obtained, airway assessed, oxygen available, suction available, emergency drugs available and hand hygiene performed  Spinal Block  Patient position: sitting  Prep: Betadine  Patient monitoring: heart rate, cardiac monitor, continuous pulse ox and blood pressure  Approach: right paramedian  Location: L4-5  Injection technique: single-shot  Needle type: pencil-tip   Needle gauge: 24 G    Assessment  Sensory level: T8

## 2021-06-12 NOTE — TELEPHONE ENCOUNTER
Refill Approved    Rx renewed per Medication Renewal Policy. Medication was last renewed on 9/14/20.    Leticia Massey, [facility] Triage/Med Refill 10/2/2020     Requested Prescriptions   Pending Prescriptions Disp Refills     atorvastatin (LIPITOR) 40 MG tablet 90 tablet 3     Sig: Take 1 tablet (40 mg total) by mouth every morning.       Statins Refill Protocol (Hmg CoA Reductase Inhibitors) Passed - 9/28/2020  1:07 PM        Passed - PCP or prescribing provider visit in past 12 months      Last office visit with prescriber/PCP: 5/31/2019 Mary Jane Gonzales MD OR same dept: Visit date not found OR same specialty: 5/31/2019 Mary Jane Gonzales MD  Last physical: 9/4/2020 Last MTM visit: Visit date not found   Next visit within 3 mo: Visit date not found  Next physical within 3 mo: Visit date not found  Prescriber OR PCP: Mary Jane Gonzales MD  Last diagnosis associated with med order: There are no diagnoses linked to this encounter.  If protocol passes may refill for 12 months if within 3 months of last provider visit (or a total of 15 months).

## 2021-06-12 NOTE — TELEPHONE ENCOUNTER
Refill Approved    Rx renewed per Medication Renewal Policy. Medication was last renewed on 7/13/20.    Leticia Massey, [facility] Triage/Med Refill 10/23/2020     Requested Prescriptions   Pending Prescriptions Disp Refills     levothyroxine (SYNTHROID, LEVOTHROID) 112 MCG tablet 90 tablet 0     Sig: Take 1 tablet (112 mcg total) by mouth Daily at 6:00 am.       Thyroid Hormones Protocol Passed - 10/21/2020 10:23 AM        Passed - Provider visit in past 12 months or next 3 months     Last office visit with prescriber/PCP: 5/31/2019 Mary Jane Gonzales MD OR same dept: Visit date not found OR same specialty: 5/31/2019 Mary Jane Gonzales MD  Last physical: 9/4/2020 Last MTM visit: Visit date not found   Next visit within 3 mo: Visit date not found  Next physical within 3 mo: Visit date not found  Prescriber OR PCP: Mary Jane Gonzales MD  Last diagnosis associated with med order: 1. Hypothyroidism, unspecified type  - levothyroxine (SYNTHROID, LEVOTHROID) 112 MCG tablet; Take 1 tablet (112 mcg total) by mouth Daily at 6:00 am.  Dispense: 90 tablet; Refill: 0    2. Chronic Kidney Disease, Stage 4  - levothyroxine (SYNTHROID, LEVOTHROID) 112 MCG tablet; Take 1 tablet (112 mcg total) by mouth Daily at 6:00 am.  Dispense: 90 tablet; Refill: 0    If protocol passes may refill for 12 months if within 3 months of last provider visit (or a total of 15 months).             Passed - TSH on file in past 12 months for patient age 12 & older     TSH   Date Value Ref Range Status   09/04/2020 1.12 0.30 - 5.00 uIU/mL Final

## 2021-06-13 NOTE — ANESTHESIA PROCEDURE NOTES
Spinal Block    Patient location during procedure: OR  Start time: 12/3/2020 10:09 AM  End time: 12/3/2020 10:11 AM  Reason for block: primary anesthetic    Staffing:  Performing  Anesthesiologist: Ally Walker MD    Preanesthetic Checklist  Completed: patient identified, risks, benefits, and alternatives discussed, timeout performed, consent obtained, airway assessed, oxygen available, suction available, emergency drugs available and hand hygiene performed  Spinal Block  Patient position: sitting  Prep: ChloraPrep  Patient monitoring: heart rate, cardiac monitor, continuous pulse ox and blood pressure  Approach: midline  Location: L3-4  Injection technique: single-shot  Needle type: pencil-tip   Needle gauge: 24 G

## 2021-06-13 NOTE — ANESTHESIA CARE TRANSFER NOTE
Last vitals:   Vitals:    12/03/20 1140   BP: 103/57   Pulse: (!) 59   Resp: 16   Temp: 36.3  C (97.4  F)   SpO2: 100%     Patient's level of consciousness is awake  Spontaneous respirations: yes  Maintains airway independently: yes  Dentition unchanged: yes  Oropharynx: oropharynx clear of all foreign objects    QCDR Measures:  ASA# 20 - Surgical Safety Checklist: WHO surgical safety checklist completed prior to induction    PQRS# 430 - Adult PONV Prevention: 4558F - Pt received => 2 anti-emetic agents (different classes) preop & intraop  ASA# 8 - Peds PONV Prevention: NA - Not pediatric patient, not GA or 2 or more risk factors NOT present  PQRS# 424 - Jossie-op Temp Management: 4559F - At least one body temp DOCUMENTED => 35.5C or 95.9F within required timeframe  PQRS# 426 - PACU Transfer Protocol: - Transfer of care checklist used  ASA# 14 - Acute Post-op Pain: ASA14B - Patient did NOT experience pain >= 7 out of 10

## 2021-06-13 NOTE — ANESTHESIA POSTPROCEDURE EVALUATION
Patient: Leticia Valiente  Procedure(s):  TOTAL KNEE ARTHROPLASTY LEFT (Left)  Anesthesia type: spinal    Patient location: PACU  Last vitals:   Vitals Value Taken Time   /70 12/03/20 1220   Temp  12/03/20 1221   Pulse 56 12/03/20 1220   Resp 19 12/03/20 1220   SpO2 98 % 12/03/20 1220   Vitals shown include unvalidated device data.  Post vital signs: stable  Level of consciousness: awake and responds to simple questions  Post-anesthesia pain: pain controlled  Post-anesthesia nausea and vomiting: no  Pulmonary: unassisted, return to baseline  Cardiovascular: stable and blood pressure at baseline  Hydration: adequate  Anesthetic events: no    QCDR Measures:  ASA# 11 - Jossie-op Cardiac Arrest: ASA11B - Patient did NOT experience unanticipated cardiac arrest  ASA# 12 - Jossie-op Mortality Rate: ASA12B - Patient did NOT die  ASA# 13 - PACU Re-Intubation Rate: ASA13B - Patient did NOT require a new airway mgmt  ASA# 10 - Composite Anes Safety: ASA10A - No serious adverse event    Additional Notes:

## 2021-06-13 NOTE — ANESTHESIA PROCEDURE NOTES
Peripheral Block    Patient location during procedure: pre-op  Start time: 12/3/2020 8:47 AM  End time: 12/3/2020 8:51 AM  post-op analgesia per surgeon order as noted in medical record  Staffing:  Performing  Anesthesiologist: Ally Walker MD  Preanesthetic Checklist  Completed: patient identified, site marked, risks, benefits, and alternatives discussed, timeout performed, consent obtained, airway assessed, oxygen available, suction available, emergency drugs available and hand hygiene performed  Peripheral Block  Block type: saphenous, adductor canal block  Prep: ChloraPrep  Patient position: supine  Patient monitoring: blood pressure, heart rate, continuous pulse oximetry and cardiac monitor  Laterality: left  Injection technique: ultrasound guided    Ultrasound used to visualize needle placement in proximity to nerve being blocked: yes   US used to visualize anesthetic spread  Visualized anatomic structures normal  No Pathological Findings  Permanent ultrasound image captured for medical record  Sterile gel and probe cover used for ultrasound.  Needle  Needle type: Stimuplex   Needle gauge: 20G  Needle length: 4 in  no peripheral nerve catheter placed  Assessment  Injection assessment: no difficulty with injection, negative aspiration for heme, no paresthesia on injection and incremental injection

## 2021-06-13 NOTE — PATIENT INSTRUCTIONS - HE

## 2021-06-13 NOTE — PROGRESS NOTES
[facility]  [address] [facility] [address]  Dept: [phone]  Dept Fax: [phone]  Primary Provider: Orlin Gonzales MD  Pre-op Performing Provider: ORLIN GONZALES    PREOPERATIVE EVALUATION:  Today's date: 11/27/2020    Leticia Valiente is a 65 y.o. female who presents for a preoperative evaluation.    Surgical Information:  Surgery/Procedure: LT total knee arthroscopy  Surgery Location: [facility]  Surgeon: Dr Mcneil  Surgery Date: 12/4/2020  Time of Surgery: TBA  Where patient plans to recover: At home with family  Fax number for surgical facility: Note does not need to be faxed, will be available electronically in Epic.    Type of Anesthesia Anticipated: General    Subjective     HPI related to upcoming procedure: Recently done with right knee arthroscopy with excellent results.  Now trying to do the left knee arthroscopy.  Patient does have underlying chronic kidney disease with an elevation of creatinine during recent surgery however it had normalized at the time of discharge.  Patient denies any acute concerns today.    Preop Questions 11/24/2020   Have you ever had a heart attack or stroke? No   Have you ever had surgery on your heart or blood vessels, such as a stent placement, a coronary artery bypass, or surgery on an artery in your head, neck, heart, or legs? No   Do you have chest pain with activity? No   Do you have a history of  heart failure? No   Do you currently have a cold, bronchitis or symptoms of other infection? No   Do you have a cough, shortness of breath, or wheezing? No   Do you or anyone in your family have previous history of blood clots? No   Do you or does anyone in your family have a serious bleeding problem such as prolonged bleeding following surgeries or cuts? No   Have you ever had problems with anemia or been told to take iron pills? No   Have you had any abnormal blood loss such as black, tarry or bloody stools, or abnormal  vaginal bleeding? No   Have you ever had a blood transfusion? No   Are you willing to have a blood transfusion if it is medically needed before, during, or after your surgery? Yes   Have you or any of your relatives ever had problems with anesthesia? No   Do you have sleep apnea, excessive snoring or daytime drowsiness? No   Do you have any artifical heart valves or other implanted medical devices like a pacemaker, defibrillator, or continuous glucose monitor? No   Do you have artificial joints? YES -right knee   Are you allergic to latex? No   Is there any chance that you may be pregnant? -     Health Care Directive:  Patient does not have a Health Care Directive or Living Will: Discussed advance care planning with patient; however, patient declined at this time.    Preoperative Review of :   No concerns  9}  See problem list for active medical problems.  Problems all longstanding and stable, except as noted/documented.  See ROS for pertinent symptoms related to these conditions.    HYPERLIPIDEMIA - Patient has a long history of significant Hyperlipidemia requiring medication for treatment with recent good control. Patient reports no problems or side effects with the medication.     HYPERTENSION - Patient has longstanding history of HTN , currently denies any symptoms referable to elevated blood pressure. Specifically denies chest pain, palpitations, dyspnea, orthopnea, PND or peripheral edema. Blood pressure readings have been in normal range. Current medication regimen is as listed below. Patient denies any side effects of medication.     HYPOTHYROIDISM - Patient has a longstanding history of chronic Hypothyroidism. Patient has been doing well, noting no tremor, insomnia, hair loss or changes in skin texture. Continues to take medications as directed, without adverse reactions or side effects. Last TSH   Lab Results   Component Value Date    TSH 1.12 09/04/2020   .      RENAL INSUFFICIENCY - Patient has a  longstanding history of moderate-severe chronic renal insufficiency. Last Cr No components found for: CRCL       Review of Systems  CONSTITUTIONAL: NEGATIVE for fever, chills, change in weight  INTEGUMENTARY/SKIN: NEGATIVE for worrisome rashes, moles or lesions  EYES: NEGATIVE for vision changes or irritation  ENT/MOUTH: NEGATIVE for ear, mouth and throat problems  RESP: NEGATIVE for significant cough or SOB  BREAST: NEGATIVE for masses, tenderness or discharge  CV: NEGATIVE for chest pain, palpitations or peripheral edema  GI: NEGATIVE for nausea, abdominal pain, heartburn, or change in bowel habits  : NEGATIVE for frequency, dysuria, or hematuria  NEURO: NEGATIVE for weakness, dizziness or paresthesias  ENDOCRINE: NEGATIVE for temperature intolerance, skin/hair changes  HEME: NEGATIVE for bleeding problems  PSYCHIATRIC: NEGATIVE for changes in mood or affect    Patient Active Problem List    Diagnosis Date Noted     S/P total knee arthroplasty, right 09/15/2020     Hematoma of muscle      Hypertension      Chronic Kidney Disease, Stage 4      Osteopenia      Iatrogenic Hypotension      Hypothyroidism      Renal Artery Stenosis      Bilateral renal artery stenosis (H) 02/08/2012     Encounter for screening for cardiovascular disorders 10/31/2010     Graves' disease 06/18/2007     Tobacco dependence 06/18/2007     Past Medical History:   Diagnosis Date     Chronic kidney disease     stage 4 kidney disease     Convulsions (H)     ? convulsions     Disease of thyroid gland      Graves disease      Hypertension      Past Surgical History:   Procedure Laterality Date     EYE SURGERY       KIDNEY STONE SURGERY       LA TOTAL KNEE ARTHROPLASTY Right 9/14/2020    Procedure: TOTAL KNEE ARTHROPLASTY RIGHT;  Surgeon: Adithya Nascimento DO;  Location: [facility];  Service: Orthopedics     TONSILLECTOMY       VULVA / PERINEUM BIOPSY Left 6/25/2019    Procedure: EXCISION OF LEFT GLUTEAL MASS;  Surgeon: Tadeo Childers  "DO;  Location: [facility] OR;  Service: General     Current Outpatient Medications   Medication Sig Dispense Refill     amLODIPine (NORVASC) 5 MG tablet Take 1 tablet (5 mg total) by mouth daily. 90 tablet 2     atorvastatin (LIPITOR) 40 MG tablet Take 1 tablet (40 mg total) by mouth every morning. 90 tablet 3     levothyroxine (SYNTHROID, LEVOTHROID) 112 MCG tablet Take 1 tablet (112 mcg total) by mouth Daily at 6:00 am. 90 tablet 3     lisinopriL (PRINIVIL,ZESTRIL) 5 MG tablet Take 5 mg by mouth at bedtime.       metoprolol succinate (TOPROL-XL) 25 MG Take 25 mg by mouth daily.  0     No current facility-administered medications for this visit.        No Known Allergies    Social History     Tobacco Use     Smoking status: Former Smoker     Packs/day: 0.25     Types: Cigarettes     Quit date: 2020     Years since quittin.7     Smokeless tobacco: Never Used     Tobacco comment: smokes 2 cigarettes per day, also using 5-6 mL nicotine in ecig   Substance Use Topics     Alcohol use: Yes     Frequency: 2-4 times a month     Drinks per session: 3 or 4     Binge frequency: Less than monthly      Family History   Problem Relation Age of Onset     Breast cancer Cousin         maternal     Breast cancer Cousin         maternal     Pancreatic cancer Cousin      Stroke Mother 90     ALS Father      Seizures Brother      Seizures Brother      Seizures Paternal Aunt      Social History     Substance and Sexual Activity   Drug Use No        Objective     /86   Pulse 78   Ht 5' 3.66\" (1.617 m)   Wt 141 lb 9.6 oz (64.2 kg)   SpO2 100%   BMI 24.57 kg/m    Physical Exam    GENERAL APPEARANCE: healthy, alert and no distress     EYES: EOMI, PERRL     NECK: no adenopathy, no asymmetry, masses, or scars and thyroid normal to palpation     RESP: lungs clear to auscultation - no rales, rhonchi or wheezes     CV: regular rates and rhythm, normal S1 S2, no S3 or S4 and no murmur, click or rub     MS: extremities " normal- no gross deformities noted and right knee surgical scar is healing without any erythema/discharge     SKIN: no suspicious lesions or rashes     NEURO: Normal strength and tone, sensory exam grossly normal, mentation intact and speech normal     PSYCH: mentation appears normal. and affect normal/bright     LYMPHATICS: No cervical adenopathy    Recent Labs   Lab Test 11/27/20  1259 09/15/20  0559 09/14/20  0613 09/04/20  1120   HGB 13.3 11.2*  --  13.9     --   --  231   INR  --   --  0.90  --    NA  --  138  --  143   K  --  3.4*  --  3.6   CREATININE  --  1.47* 2.27* 1.97*        PRE-OP Diagnostics:   Labs pending at this time. Results will be reviewed when available.  No EKG required, no history of coronary heart disease, significant arrhythmia, peripheral arterial disease or other structural heart disease.    REVISED CARDIAC RISK INDEX (RCRI)   The patient has the following serious cardiovascular risks for perioperative complications:   - No serious cardiac risks = 0 points    RCRI INTERPRETATION: 0 points: Class I (very low risk - 0.4% complication rate)         Assessment & Plan      The proposed surgical procedure is considered INTERMEDIATE risk.    Problem List Items Addressed This Visit        Edg Concept Cardiac Problems    Hypertension       Other    Chronic Kidney Disease, Stage 4      Other Visit Diagnoses     Preop general physical exam    -  Primary    Relevant Orders    HM1(CBC and Differential) (Completed)    Basic Metabolic Panel           Risks and Recommendations:  The patient has the following additional risks and recommendations for perioperative complications:   - No identified additional risk factors other than previously addressed    Medication Instructions:  Patient is to take all scheduled medications on the day of surgery    RECOMMENDATION:  APPROVAL GIVEN to proceed with proposed procedure, without further diagnostic evaluation.    Signed Electronically by: Mary Jane Gonzales  MD    Copy of this evaluation report is provided to requesting physician.    Preop [facility] Preop Guidelines    Revised Cardiac Risk Index

## 2021-06-13 NOTE — ANESTHESIA PREPROCEDURE EVALUATION
Anesthesia Evaluation      Patient summary reviewed     Airway   Mallampati: I  Neck ROM: full   Pulmonary - negative ROS and normal exam                          Cardiovascular - normal exam  (+) hypertension, , hypercholesterolemia,      Neuro/Psych - negative ROS     Endo/Other    (+) hypothyroidism,      GI/Hepatic/Renal            Dental    (+) upper dentures and lower dentures                       Anesthesia Plan  Planned anesthetic: spinal and peripheral nerve block    COVID neg 12/1    Spinal with adductor canal block  ASA 2     Anesthetic plan and risks discussed with: patient    Post-op plan: routine recovery

## 2021-06-17 NOTE — PROGRESS NOTES
ASSESSMENT & PLAN:  1. Asymmetrical hearing loss of left ear  She reports the left ear always seems muffled, and she cannot hear well out of that ear.  The exam is normal today without middle ear effusion.  No known noise exposure.  Recommend formal audiology evaluation, and if she truly has asymmetrical hearing loss, needs to see ENT for further workup.  Reasons for this explained to patient.  - Ambulatory referral to Audiology    2.  Hypertension  Blood pressure remains high today, similar to what it was a couple of weeks ago with nephrology.  Add lisinopril 5 mg daily, prescription provided.  Discussed the need to return in 1-2 weeks for recheck of blood pressure and BMP, ideally would combine this with a physical with Pap since she is overdue    3.  Hyperlipidemia  Plan to get nonfasting lipid panel when she returns for recheck BMP in a week or 2    4.  Chronic kidney disease  She continues to follow with nephrology    5.  Nicotine dependence  She is currently smoking 5-6 cigarettes daily.  She would like to try the patch.  14 mg patch prescribed, use for approximately 6 weeks and then taper down to 7 mg patch.  She can call when she needs that prescription.    6.  Hypothyroidism  Recent TSH was within normal limits.  Continue current dose of levothyroxine 112 mcg daily    As above, patient was notified she is overdue for Pap, recommend she schedule a physical    There are no Patient Instructions on file for this visit.    No orders of the defined types were placed in this encounter.    There are no discontinued medications.    No Follow-up on file.    CHIEF COMPLAINT:  Chief Complaint   Patient presents with     Medication Management       HISTORY OF PRESENT ILLNESS:  Leticia is a 62 y.o. female presenting to the clinic today for medication management. She brings in documentation of a visit to nephrology on 3/26 with creatinine levels of 1.75 and GFR of 31 mL/min.    Hypertension: She continues on 5 mg amlodipine  "and 50 mg metoprolol daily for hypertension.  She just saw her nephrologist on 3/26, blood pressure was elevated at that time.  It was recommended by nephrology that she start on an ACE inhibitor if her blood pressure was high today.     Hyperlipidemia: She continues on 40 mg atorvastatin daily.    Hypothyroidism: She takes 112 mcg levothyroxine daily.     Eye Redness: She has been having dry red eyes during the day. Has been frequently using Visine in both eyes. She has not been to an eye doctor recently; last seen more than 6 months ago.    Hearing Loss: She adds that she has been having some issues hearing from her left ear. The onset was sudden.     Tobacco Cessation: She continues to smoke 5-6 cigarettes a day. She also smokes 5-6 mL nicotine in an e-cigarette. She rarely smokes at home, mostly at work to relieve stress. She did not tolerate Chantix in the past. She is open to trying the patch.    REVIEW OF SYSTEMS:   All other systems are negative.    PFSH:  Social: Continues to smoke 5-6 cigarettes a day, also using E-cigarette. She works.     TOBACCO USE:  History   Smoking Status     Current Every Day Smoker   Smokeless Tobacco     Never Used       VITALS:  Vitals:    04/10/18 1554   BP: 152/86   Patient Site: Left Arm   Patient Position: Sitting   Cuff Size: Adult Regular   Pulse: 66   Resp: 16   Temp: 97.9  F (36.6  C)   TempSrc: Oral   Weight: 145 lb (65.8 kg)   Height: 5' 3.58\" (1.615 m)     Wt Readings from Last 3 Encounters:   04/10/18 145 lb (65.8 kg)   02/21/17 136 lb 1.6 oz (61.7 kg)   11/16/15 146 lb 4 oz (66.3 kg)     Body mass index is 25.22 kg/(m^2).    PHYSICAL EXAM:  GENERAL: Pleasant, well-appearing patient in no acute distress.   HEENT: Pupils equal round reactive to light. Sclerae and conjunctivae clear. TMs clear on left. Oropharynx is clear with moist mucous membranes.   NECK: Supple without lymphadenopathy, no carotid bruits   CARDIOVASCULAR: Heart regular rate and rhythm without murmur " normal S1-S2   LUNGS: Clear to auscultation bilaterally, good air movement throughout   EXTREMITIES Warm and well-perfused without edema.   NEURO: Alert and oriented. Grossly nonfocal.   PSYCHIATRIC: Presents on time and well groomed. Normal speech and thought content. Full affect. No abnormal movements or behaviors noted.      QUALITY MEASURES:  I have counseled the patient for tobacco cessation and the follow up will occur  at the next visit.    ADDITIONAL HISTORY SUMMARIZED (2): Reviewed 3/26/18 nephrology note regarding CKD.  DECISION TO OBTAIN EXTRA INFORMATION (1): Accessed Care Everywhere.  RADIOLOGY TESTS (1): None.  LABS (1): Reviewed labs from 3/26/18, creatinine, and GFR.   MEDICINE TESTS (1): None.  INDEPENDENT REVIEW (2 each): None.       The visit lasted a total of 17 minutes face to face with the patient. Over 50% of the time was spent counseling and educating the patient about medication management.    I, Deana Gregory, am scribing for and in the presence of, Dr. Jc.    I, Dr. Jc, personally performed the services described in this documentation, as scribed by Deaan Gregory in my presence, and it is both accurate and complete.    MEDICATIONS:  Current Outpatient Prescriptions   Medication Sig Dispense Refill     amLODIPine (NORVASC) 5 MG tablet        atorvastatin (LIPITOR) 40 MG tablet TAKE ONE TABLET BY MOUTH ONCE DAILY 90 tablet 2     levothyroxine (SYNTHROID, LEVOTHROID) 112 MCG tablet TAKE ONE TABLET BY MOUTH ONCE DAILY AT  600  AM 90 tablet 0     metoprolol succinate (TOPROL-XL) 50 MG 24 hr tablet   0     metoprolol tartrate (LOPRESSOR) 25 MG tablet        No current facility-administered medications for this visit.        Total data points: 4

## 2021-06-24 NOTE — TELEPHONE ENCOUNTER
Please call patient to schedule an establish  Care fasting physical.  Dr. Jc suggested a fasting physical with pap at her last visit in clinic on 4/10/2018.    Please update PCP if patient is seeing another provider.

## 2021-06-24 NOTE — PATIENT INSTRUCTIONS - HE
BP Readings from Last 7 Encounters:   03/08/19 148/89   04/10/18 152/86   02/21/17 126/68   11/16/15 134/78     Check with your Nephrologist regarding your BP medications.    Take all medications for your visit.

## 2021-06-24 NOTE — PROGRESS NOTES
FEMALE ADULT PREVENTIVE EXAM    ASSESSMENT:   Ute was seen today for annual exam.  1. Annual physical exam  Vitamin D, Total (25-Hydroxy)    Gynecologic Cytology (PAP Smear)    Comprehensive Metabolic Panel    Ambulatory referral for Colonoscopy   2. Visit for screening mammogram  Mammo Screening Bilateral   3. Hyperlipidemia  Lipid Cascade    atorvastatin (LIPITOR) 40 MG tablet   4. Hypothyroidism, unspecified type  Thyroid Cascade    Lipid Cascade    levothyroxine (SYNTHROID, LEVOTHROID) 112 MCG tablet   5. Chronic Kidney Disease, Stage 4  levothyroxine (SYNTHROID, LEVOTHROID) 112 MCG tablet    Comprehensive Metabolic Panel   6. Smoker  DXA Bone Density Scan   7. Chest pain, musculoskeletal     8. Serum calcium elevated  Calcium    Parathyroid Hormone Intact       Emphasized with the patient that she needs to start taking her medications.  Referral was sent to the patient labs as above.  She has an upcoming visit with the nephrologist and she is advised to take all her medications to review.    PLAN:     quit smoking, begin progressive daily aerobic exercise program, follow a low fat, low cholesterol diet, attempt to lose weight, decrease or avoid alcohol intake, continue current medications and continue current healthy lifestyle patterns      CHIEF COMPLAINT:      Chief Complaint   Patient presents with     [facility] Care     est care physical- pt is fasting     Breast Pain     left side breast pain x 3 weeks         SUBJECTIVE:  Leticia Valiente is a 63 y.o. female   presents today for an annual physical examination.   She denies any acute concerns .    Left breast area is sore for three weeks.   She does not remember doing anything exertional.  It feels sore especially when she touches the area.  She has not felt any breast lumps or any axillary lumps.    Reviewing her medications, patient informs me that she does not take all her medications.  She is only on 3 medications which is 2 blood pressure  medications and her thyroid medication.  I have reviewed her previous recommendation from a nephrologist.  Her blood pressure has been high.  With history of chronic kidney disease, she was asked to start lisinopril.    That she believes she may also not be taking Lipitor at this time.      Leticia Valiente  has a past medical history  has no past medical history on file.        Surgeries:      History reviewed. No pertinent surgical history.      Family History:  family history includes ALS in her father; Breast cancer in her cousin and cousin; Pancreatic cancer in her cousin; Stroke (age of onset: 90) in her mother.    Social History:   reports that she has been smoking cigarettes.  She has been smoking about 0.25 packs per day. she has never used smokeless tobacco. She reports that she drinks alcohol. She reports that she does not use drugs.    Medications:    Current Outpatient Medications on File Prior to Visit   Medication Sig Dispense Refill     metoprolol succinate (TOPROL-XL) 50 MG 24 hr tablet   0     No current facility-administered medications on file prior to visit.          Allergies:    No Known Allergies      HEALTH MAINTENANCE:  Pap- 2012  NORMAL  Mammography: 2017 NORMAL  Colon/Flex Sig: performed in 2009. Next due in 2019.  DEXA: Not yet.       Healthy Habits:   Regular Exercise: Yes  Sunscreen Use: Yes  Healthy Diet: Yes  Dental Visits Regularly: Yes  Seat Belt: Yes  Sexually active: Yes  Self Breast Exam Monthly:Yes and No  Hemoccults: N/A  Flex Sig: No  Colonoscopy: Yes  Lipid Profile: Yes  Glucose Screen: Yes  Prevention of Osteoporosis: No  Last Dexa: No  Guns at Home:  Yes  Guns Safety Locks:  Yes      REVIEW OF SYSTEMS:  Complete head to toe review of systems is otherwise negative except as above.  Leticia Valiente denies any fever, cough, loss of appetite, heart issues, GI issues, new skin lesions, endocrine issues.  She informs me she feels well.      OBJECTIVE:  VITAL SIGNS: /89  "(Patient Site: Left Arm, Patient Position: Sitting, Cuff Size: Adult Regular)   Pulse 76   Temp 99  F (37.2  C) (Oral)   Resp 16   Ht 5' 3.75\" (1.619 m)   Wt 163 lb (73.9 kg)   BMI 28.20 kg/m      GENERAL:  Patient alert, in no acute distress.  EYES: PERRLA. Extraoccular movements intact, pupils equal, reactive to light and accommodation.  Normal conjunctiva and lids.  ENT:  Hearing grossly normal.  Normal appearance to ears and nose.  Bilateral TM s, external canals, oropharynx normal. Normal lips, gums and teeth.   NECK:  Supple, without thyromegaly or mass.  RESP:  Clear to auscultation without crackles, wheezes or distress.  Normal respiratory effort.   CV:  Regular rate and rhythm without murmurs, rubs or gallops.  Normal carotid, abdominal aorta, femoral and pedal pulses.  No varicosities or edema.  ABDOMEN:  Soft, non-tender, without hepatosplenomegaly, masses, or hernias.   BREASTS:  Nontender, without masses, nipple discharge, erythema, or axillary adenopathy.    PELVIC EXAMINATION:  EXT GEN: No lesions.   PERINEUM: Intact. No perineal or external anal lesions.  URETHRA: No meatal lesions, prolapse. No urethral          tenderness or masses  BLADDER: Nontender, no masses  INTROITUS: Well supported. No lesions or other abnormalities  VAGINA: Clean, no bleeding  CERVIX: Normal appearing epithelium. Parous.  UTERUS: Nl in size, shape and consistency ..  ADNEXA: Clear, nontender  LYMPHATIC: Normal palpation of neck, and axilla..  No lymphadenopathy.   NEURO:   motor & sensory function all intact.  DTR and reflexes normal.  PSYCHIATRIC:  Alert & oriented with normal mood and affect.  Good judgment and insight.  SKIN:  Normal inspection and palpation.  MUSCULOSKELETAL: Normal gait and station.  Nonspecific mild tenderness in the left upper sternal and pectoral this area and shoulder.        "

## 2021-07-31 ENCOUNTER — HEALTH MAINTENANCE LETTER (OUTPATIENT)
Age: 66
End: 2021-07-31

## 2021-08-03 PROBLEM — R56.9 CONVULSIONS (H): Status: RESOLVED | Noted: 2019-03-08 | Resolved: 2020-11-27

## 2021-09-25 ENCOUNTER — HEALTH MAINTENANCE LETTER (OUTPATIENT)
Age: 66
End: 2021-09-25

## 2021-09-28 DIAGNOSIS — E78.2 MIXED HYPERLIPIDEMIA: Primary | ICD-10-CM

## 2021-09-28 RX ORDER — LISINOPRIL 5 MG/1
5 TABLET ORAL DAILY
COMMUNITY
End: 2022-01-28

## 2021-09-28 RX ORDER — HYDROXYZINE HYDROCHLORIDE 10 MG/1
10 TABLET, FILM COATED ORAL
COMMUNITY
Start: 2020-12-04 | End: 2021-12-21

## 2021-09-28 RX ORDER — ATORVASTATIN CALCIUM 40 MG/1
40 TABLET, FILM COATED ORAL
COMMUNITY
Start: 2020-10-02 | End: 2021-09-28

## 2021-09-29 RX ORDER — ATORVASTATIN CALCIUM 40 MG/1
40 TABLET, FILM COATED ORAL DAILY
Qty: 90 TABLET | Refills: 3 | Status: SHIPPED | OUTPATIENT
Start: 2021-09-29 | End: 2022-09-27

## 2021-09-29 NOTE — TELEPHONE ENCOUNTER
" Disp Refills Start End TRISTAN   atorvastatin (LIPITOR) 40 MG tablet 90 tablet 3 10/2/2020  No   Sig - Route: Take 1 tablet (40 mg total) by mouth every morning. - Oral   Sent to pharmacy as: atorvastatin 40 mg tablet (LIPITOR)   E-Prescribing Status: Receipt confirmed by pharmacy (10/2/2020  8:47 AM CDT)       atorvastatin (LIPITOR) 40 MG tablet [[id number]]    Electronically signed by: Leticia Massey RN on 10/02/20 0847 Status: Active   Ordering user: Leticia Massey RN 10/02/20 0847 Ordering provider: Mary Jane Gonzales MD   Authorized by: Mary Jane Gonzales MD   Frequency: QAM 10/02/20 - Until Discontinued Released by: Leticia Massey RN 10/02/20 0847   Diagnoses  Mixed hyperlipidemia [E78.2]       Routing refill request to provider for review/approval because:  Labs not current:  LDL    Last Written Prescription Date:  10/2/2020  Last Fill Quantity: 90,  # refills: 3   Last office visit provider:  11/27/2020     Requested Prescriptions   Pending Prescriptions Disp Refills     atorvastatin (LIPITOR) 40 MG tablet 90 tablet 3     Sig: Take 1 tablet (40 mg) by mouth daily       Statins Protocol Failed - 9/28/2021 11:50 AM        Failed - LDL on file in past 12 months     Recent Labs   Lab Test 09/04/20  1120   LDL 77             Passed - No abnormal creatine kinase in past 12 months     No lab results found.             Passed - Recent (12 mo) or future (30 days) visit within the authorizing provider's specialty     Patient has had an office visit with the authorizing provider or a provider within the authorizing providers department within the previous 12 mos or has a future within next 30 days. See \"Patient Info\" tab in inbasket, or \"Choose Columns\" in Meds & Orders section of the refill encounter.              Passed - Medication is active on med list        Passed - Patient is age 18 or older        Passed - No active pregnancy on record        Passed - No positive pregnancy test in past 12 months         Signed " Prescriptions Disp Refills    atorvastatin (LIPITOR) 40 MG tablet       Sig: Take 40 mg by mouth       There is no refill protocol information for this order       hydrOXYzine (ATARAX) 10 MG tablet       Sig: Take 10 mg by mouth       There is no refill protocol information for this order       lisinopril (ZESTRIL) 5 MG tablet       Sig: Take 5 mg by mouth       There is no refill protocol information for this order          Mohsen Zaldivar RN 09/29/21 5:16 PM

## 2021-10-13 DIAGNOSIS — E03.9 HYPOTHYROIDISM, UNSPECIFIED TYPE: ICD-10-CM

## 2021-10-14 RX ORDER — LEVOTHYROXINE SODIUM 112 UG/1
TABLET ORAL
Qty: 30 TABLET | Refills: 0 | Status: SHIPPED | OUTPATIENT
Start: 2021-10-14 | End: 2021-11-17

## 2021-10-14 NOTE — TELEPHONE ENCOUNTER
"Routing refill request to provider for review/approval because:  Labs not current:  TSH    Last Written Prescription Date:  11/14/21  Last Fill Quantity: 90,  # refills: 3   Last office visit provider:  11/27/20     Requested Prescriptions   Pending Prescriptions Disp Refills     levothyroxine (SYNTHROID/LEVOTHROID) 112 MCG tablet 90 tablet 3       Thyroid Protocol Failed - 10/13/2021  9:00 AM        Failed - Normal TSH on file in past 12 months     Recent Labs   Lab Test 09/04/20  1120   TSH 1.12              Passed - Patient is 12 years or older        Passed - Recent (12 mo) or future (30 days) visit within the authorizing provider's specialty     Patient has had an office visit with the authorizing provider or a provider within the authorizing providers department within the previous 12 mos or has a future within next 30 days. See \"Patient Info\" tab in inbasket, or \"Choose Columns\" in Meds & Orders section of the refill encounter.              Passed - Medication is active on med list        Passed - No active pregnancy on record     If patient is pregnant or has had a positive pregnancy test, please check TSH.          Passed - No positive pregnancy test in past 12 months     If patient is pregnant or has had a positive pregnancy test, please check TSH.               elliot trejo RN 10/13/21 9:14 PM  "

## 2021-11-08 ENCOUNTER — IMMUNIZATION (OUTPATIENT)
Dept: NURSING | Facility: CLINIC | Age: 66
End: 2021-11-08
Payer: COMMERCIAL

## 2021-11-08 PROCEDURE — 0004A PR COVID VAC PFIZER DIL RECON 30 MCG/0.3 ML IM: CPT

## 2021-11-08 PROCEDURE — 90662 IIV NO PRSV INCREASED AG IM: CPT

## 2021-11-08 PROCEDURE — G0008 ADMIN INFLUENZA VIRUS VAC: HCPCS

## 2021-11-08 PROCEDURE — 91300 PR COVID VAC PFIZER DIL RECON 30 MCG/0.3 ML IM: CPT

## 2021-11-15 DIAGNOSIS — E03.9 HYPOTHYROIDISM, UNSPECIFIED TYPE: ICD-10-CM

## 2021-11-17 RX ORDER — LEVOTHYROXINE SODIUM 112 UG/1
TABLET ORAL
Qty: 30 TABLET | Refills: 0 | Status: SHIPPED | OUTPATIENT
Start: 2021-11-17 | End: 2021-12-14

## 2021-11-17 NOTE — TELEPHONE ENCOUNTER
"Routing refill request to provider for review/approval because:  Labs not current:  TSH    Last Written Prescription Date:  10/14/21  Last Fill Quantity: 30,  # refills: 0   Last office visit provider:  11/27/20     Requested Prescriptions   Pending Prescriptions Disp Refills     levothyroxine (SYNTHROID/LEVOTHROID) 112 MCG tablet 30 tablet 0     Sig: Take  by mouth. 1/2 tablet (56 mcg) one day/week and 1 tablet (112mcg) all other days       Thyroid Protocol Failed - 11/15/2021  5:13 PM        Failed - Normal TSH on file in past 12 months     Recent Labs   Lab Test 09/04/20  1120   TSH 1.12              Passed - Patient is 12 years or older        Passed - Recent (12 mo) or future (30 days) visit within the authorizing provider's specialty     Patient has had an office visit with the authorizing provider or a provider within the authorizing providers department within the previous 12 mos or has a future within next 30 days. See \"Patient Info\" tab in inbasket, or \"Choose Columns\" in Meds & Orders section of the refill encounter.              Passed - Medication is active on med list        Passed - No active pregnancy on record     If patient is pregnant or has had a positive pregnancy test, please check TSH.          Passed - No positive pregnancy test in past 12 months     If patient is pregnant or has had a positive pregnancy test, please check TSH.               Cammie Jacobs RN 11/17/21 1:43 PM  "

## 2021-12-13 DIAGNOSIS — E03.9 HYPOTHYROIDISM, UNSPECIFIED TYPE: ICD-10-CM

## 2021-12-14 RX ORDER — LEVOTHYROXINE SODIUM 112 UG/1
TABLET ORAL
Qty: 30 TABLET | Refills: 0 | Status: SHIPPED | OUTPATIENT
Start: 2021-12-14 | End: 2021-12-21

## 2021-12-14 NOTE — TELEPHONE ENCOUNTER
Please inform patient that I have provided a refill.  She is due for her med check visit  Mary Jane Gonzales MD

## 2021-12-21 ENCOUNTER — OFFICE VISIT (OUTPATIENT)
Dept: FAMILY MEDICINE | Facility: CLINIC | Age: 66
End: 2021-12-21
Payer: COMMERCIAL

## 2021-12-21 VITALS
RESPIRATION RATE: 18 BRPM | OXYGEN SATURATION: 99 % | BODY MASS INDEX: 22.53 KG/M2 | WEIGHT: 132 LBS | SYSTOLIC BLOOD PRESSURE: 122 MMHG | HEART RATE: 66 BPM | HEIGHT: 64 IN | TEMPERATURE: 97.2 F | DIASTOLIC BLOOD PRESSURE: 82 MMHG

## 2021-12-21 DIAGNOSIS — R19.7 DIARRHEA, UNSPECIFIED TYPE: ICD-10-CM

## 2021-12-21 DIAGNOSIS — R41.89 SPELL OF ALTERED COGNITION: ICD-10-CM

## 2021-12-21 DIAGNOSIS — Z13.6 CARDIOVASCULAR SCREENING; LDL GOAL LESS THAN 100: ICD-10-CM

## 2021-12-21 DIAGNOSIS — N18.32 STAGE 3B CHRONIC KIDNEY DISEASE (H): Primary | ICD-10-CM

## 2021-12-21 DIAGNOSIS — Z12.31 VISIT FOR SCREENING MAMMOGRAM: ICD-10-CM

## 2021-12-21 DIAGNOSIS — I70.1 BILATERAL RENAL ARTERY STENOSIS (H): ICD-10-CM

## 2021-12-21 DIAGNOSIS — E89.0 POSTABLATIVE HYPOTHYROIDISM: ICD-10-CM

## 2021-12-21 LAB
ALBUMIN SERPL-MCNC: 3.7 G/DL (ref 3.4–5)
ALP SERPL-CCNC: 75 U/L (ref 40–150)
ALT SERPL W P-5'-P-CCNC: 19 U/L (ref 0–50)
ANION GAP SERPL CALCULATED.3IONS-SCNC: 8 MMOL/L (ref 3–14)
AST SERPL W P-5'-P-CCNC: 14 U/L (ref 0–45)
BILIRUB SERPL-MCNC: 0.5 MG/DL (ref 0.2–1.3)
BUN SERPL-MCNC: 27 MG/DL (ref 7–30)
CALCIUM SERPL-MCNC: 9.6 MG/DL (ref 8.5–10.1)
CHLORIDE BLD-SCNC: 113 MMOL/L (ref 94–109)
CHOLEST SERPL-MCNC: 205 MG/DL
CO2 SERPL-SCNC: 23 MMOL/L (ref 20–32)
CREAT SERPL-MCNC: 1.68 MG/DL (ref 0.52–1.04)
ERYTHROCYTE [DISTWIDTH] IN BLOOD BY AUTOMATED COUNT: 12.9 % (ref 10–15)
FASTING STATUS PATIENT QL REPORTED: YES
GFR SERPL CREATININE-BSD FRML MDRD: 33 ML/MIN/1.73M2
GLUCOSE BLD-MCNC: 78 MG/DL (ref 70–99)
HCT VFR BLD AUTO: 43.8 % (ref 35–47)
HDLC SERPL-MCNC: 144 MG/DL
HGB BLD-MCNC: 14.6 G/DL (ref 11.7–15.7)
LDLC SERPL CALC-MCNC: 49 MG/DL
MCH RBC QN AUTO: 32.5 PG (ref 26.5–33)
MCHC RBC AUTO-ENTMCNC: 33.3 G/DL (ref 31.5–36.5)
MCV RBC AUTO: 98 FL (ref 78–100)
NONHDLC SERPL-MCNC: 61 MG/DL
PLATELET # BLD AUTO: 226 10E3/UL (ref 150–450)
POTASSIUM BLD-SCNC: 3 MMOL/L (ref 3.4–5.3)
PROT SERPL-MCNC: 7.6 G/DL (ref 6.8–8.8)
RBC # BLD AUTO: 4.49 10E6/UL (ref 3.8–5.2)
SODIUM SERPL-SCNC: 144 MMOL/L (ref 133–144)
T4 FREE SERPL-MCNC: 1.35 NG/DL (ref 0.76–1.46)
TRIGL SERPL-MCNC: 60 MG/DL
TSH SERPL DL<=0.005 MIU/L-ACNC: 0.01 MU/L (ref 0.4–4)
WBC # BLD AUTO: 6.3 10E3/UL (ref 4–11)

## 2021-12-21 PROCEDURE — 84443 ASSAY THYROID STIM HORMONE: CPT | Performed by: INTERNAL MEDICINE

## 2021-12-21 PROCEDURE — 80053 COMPREHEN METABOLIC PANEL: CPT | Performed by: INTERNAL MEDICINE

## 2021-12-21 PROCEDURE — 85027 COMPLETE CBC AUTOMATED: CPT | Performed by: INTERNAL MEDICINE

## 2021-12-21 PROCEDURE — 83516 IMMUNOASSAY NONANTIBODY: CPT | Mod: 59 | Performed by: INTERNAL MEDICINE

## 2021-12-21 PROCEDURE — 90732 PPSV23 VACC 2 YRS+ SUBQ/IM: CPT | Performed by: INTERNAL MEDICINE

## 2021-12-21 PROCEDURE — 90714 TD VACC NO PRESV 7 YRS+ IM: CPT | Performed by: INTERNAL MEDICINE

## 2021-12-21 PROCEDURE — 80061 LIPID PANEL: CPT | Performed by: INTERNAL MEDICINE

## 2021-12-21 PROCEDURE — 90471 IMMUNIZATION ADMIN: CPT | Performed by: INTERNAL MEDICINE

## 2021-12-21 PROCEDURE — 84439 ASSAY OF FREE THYROXINE: CPT | Performed by: INTERNAL MEDICINE

## 2021-12-21 PROCEDURE — 99214 OFFICE O/P EST MOD 30 MIN: CPT | Mod: 25 | Performed by: INTERNAL MEDICINE

## 2021-12-21 PROCEDURE — 36415 COLL VENOUS BLD VENIPUNCTURE: CPT | Performed by: INTERNAL MEDICINE

## 2021-12-21 PROCEDURE — G0009 ADMIN PNEUMOCOCCAL VACCINE: HCPCS | Mod: 59 | Performed by: INTERNAL MEDICINE

## 2021-12-21 RX ORDER — LEVOTHYROXINE SODIUM 112 UG/1
112 TABLET ORAL DAILY
Qty: 90 TABLET | Refills: 3 | Status: SHIPPED | OUTPATIENT
Start: 2021-12-21 | End: 2021-12-22

## 2021-12-21 RX ORDER — METOPROLOL SUCCINATE 25 MG/1
1 TABLET, EXTENDED RELEASE ORAL DAILY
COMMUNITY
Start: 2021-05-07 | End: 2022-01-28

## 2021-12-21 ASSESSMENT — MIFFLIN-ST. JEOR: SCORE: 1128.75
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
## 2021-12-21 NOTE — NURSING NOTE
"Chief Complaint   Patient presents with     Thyroid Problem     /82 (Cuff Size: Adult Regular)   Pulse 66   Temp 97.2  F (36.2  C) (Tympanic)   Resp 18   Ht 1.626 m (5' 4\")   Wt 59.9 kg (132 lb)   LMP  (LMP Unknown)   SpO2 99%   Breastfeeding No   BMI 22.66 kg/m   Estimated body mass index is 22.66 kg/m  as calculated from the following:    Height as of this encounter: 1.626 m (5' 4\").    Weight as of this encounter: 59.9 kg (132 lb).  Patient presents to the clinic using No DME      Health Maintenance that is potentially due pending provider review:    Health Maintenance Due   Topic Date Due     ANNUAL REVIEW OF HM ORDERS  Never done     ADVANCE CARE PLANNING  Never done     URINALYSIS  Never done     HIV SCREENING  Never done     HEPATITIS C SCREENING  Never done     ZOSTER IMMUNIZATION (1 of 2) Never done     LUNG CANCER SCREENING  Never done     MICROALBUMIN  01/27/2012     BMP  03/03/2021     MAMMO SCREENING  05/31/2021     HEMOGLOBIN  06/04/2021     MEDICARE ANNUAL WELLNESS VISIT  06/24/2021     FALL RISK ASSESSMENT  Never done     Pneumococcal Vaccine: Pediatrics (0 to 5 Years) and At-Risk Patients (6 to 64 Years) (2 of 2 - PPSV23) 06/24/2021     DTAP/TDAP/TD IMMUNIZATION (3 - Td or Tdap) 07/27/2021     LIPID  09/04/2021     Pneumococcal Vaccine: 65+ Years (2 of 2 - PPSV23) 06/24/2021                "

## 2021-12-21 NOTE — PATIENT INSTRUCTIONS
Health Care Maintenance:  1. Recommend filling out an Advance Care Directive  2. Recommend pneumonia, tetanus, shingles vaccine  3. You are due for a mammogram.  Please call [phone] to schedule.    Diarrhea:  1. Blood work   2. Return stool test.  3. If negative, get CT scan  4. If negative, then colonoscopy is next step    Thyroid:  1. Refill sent of 112 mcg once daily

## 2021-12-22 DIAGNOSIS — E89.0 POSTABLATIVE HYPOTHYROIDISM: ICD-10-CM

## 2021-12-22 DIAGNOSIS — E87.6 HYPOKALEMIA: Primary | ICD-10-CM

## 2021-12-22 RX ORDER — LEVOTHYROXINE SODIUM 100 UG/1
100 TABLET ORAL DAILY
Qty: 90 TABLET | Refills: 3 | Status: SHIPPED | OUTPATIENT
Start: 2021-12-22 | End: 2022-01-28

## 2021-12-22 RX ORDER — POTASSIUM CHLORIDE 1500 MG/1
40 TABLET, EXTENDED RELEASE ORAL DAILY
Qty: 14 TABLET | Refills: 0 | Status: SHIPPED | OUTPATIENT
Start: 2021-12-22 | End: 2021-12-29

## 2021-12-22 NOTE — RESULT ENCOUNTER NOTE
The TSH thyroid hormone is low.  Recommend to decrease the levothyroxine from 112 to 100 mcg.  Recheck thyroid in about 2 months.    The cholesterol is improved compared to 1 year ago, and significantly improved compared to 2 years ago.    The kidney function is mildly low but similar to previous values.  The potassium is low.  Recommend potassium replacement for 1 week to build up stores again.  From there, try to incorporate more potassium rich foods in the diet.  We will recheck potassium when we recheck the thyroid    The potassium replacement and the new dose of the levothyroxine went to the pharmacy    High Potassium Foods: banana, apricot, artichoke, cantaloupe, broccoli, brussel sprouts, carrots, beets and beet greens, orange juice sweet potato, baked potato, tomatos, yogurt, squash

## 2021-12-23 LAB
TTG IGA SER-ACNC: 0.3 U/ML
TTG IGG SER-ACNC: <0.6 U/ML

## 2021-12-31 ENCOUNTER — HOSPITAL ENCOUNTER (OUTPATIENT)
Dept: CT IMAGING | Facility: CLINIC | Age: 66
Discharge: HOME | End: 2021-12-31
Attending: INTERNAL MEDICINE | Admitting: INTERNAL MEDICINE
Payer: COMMERCIAL

## 2021-12-31 ENCOUNTER — LAB (OUTPATIENT)
Dept: LAB | Facility: CLINIC | Age: 66
End: 2021-12-31
Payer: COMMERCIAL

## 2021-12-31 DIAGNOSIS — R19.7 DIARRHEA, UNSPECIFIED TYPE: Primary | ICD-10-CM

## 2021-12-31 DIAGNOSIS — R19.7 DIARRHEA, UNSPECIFIED TYPE: ICD-10-CM

## 2021-12-31 LAB
C COLI+JEJUNI+LARI FUSA STL QL NAA+PROBE: NOT DETECTED
C DIFF TOX B STL QL: NEGATIVE
CREAT BLD-MCNC: 2.1 MG/DL (ref 0.5–1)
EC STX1 GENE STL QL NAA+PROBE: NOT DETECTED
EC STX2 GENE STL QL NAA+PROBE: NOT DETECTED
GFR SERPL CREATININE-BSD FRML MDRD: 26 ML/MIN/1.73M2
NOROV GI+II ORF1-ORF2 JNC STL QL NAA+PR: NOT DETECTED
RVA NSP5 STL QL NAA+PROBE: NOT DETECTED
SALMONELLA SP RPOD STL QL NAA+PROBE: NOT DETECTED
SHIGELLA SP+EIEC IPAH STL QL NAA+PROBE: NOT DETECTED
V CHOL+PARA RFBL+TRKH+TNAA STL QL NAA+PR: NOT DETECTED
Y ENTERO RECN STL QL NAA+PROBE: NOT DETECTED

## 2021-12-31 PROCEDURE — 82565 ASSAY OF CREATININE: CPT

## 2021-12-31 PROCEDURE — 87493 C DIFF AMPLIFIED PROBE: CPT | Mod: 59

## 2021-12-31 PROCEDURE — 87506 IADNA-DNA/RNA PROBE TQ 6-11: CPT

## 2021-12-31 PROCEDURE — 74176 CT ABD & PELVIS W/O CONTRAST: CPT

## 2021-12-31 RX ORDER — IOPAMIDOL 755 MG/ML
65 INJECTION, SOLUTION INTRAVASCULAR ONCE
Status: DISCONTINUED | OUTPATIENT
Start: 2021-12-31 | End: 2021-12-31

## 2022-01-07 ENCOUNTER — APPOINTMENT (OUTPATIENT)
Dept: CT IMAGING | Facility: CLINIC | Age: 67
DRG: 684 | End: 2022-01-07
Attending: EMERGENCY MEDICINE
Payer: COMMERCIAL

## 2022-01-07 ENCOUNTER — APPOINTMENT (OUTPATIENT)
Dept: ULTRASOUND IMAGING | Facility: CLINIC | Age: 67
DRG: 684 | End: 2022-01-07
Attending: EMERGENCY MEDICINE
Payer: COMMERCIAL

## 2022-01-07 ENCOUNTER — HOSPITAL ENCOUNTER (INPATIENT)
Facility: CLINIC | Age: 67
LOS: 1 days | Discharge: HOME | DRG: 684 | End: 2022-01-08
Attending: EMERGENCY MEDICINE | Admitting: EMERGENCY MEDICINE
Payer: COMMERCIAL

## 2022-01-07 DIAGNOSIS — Z87.891 PERSONAL HISTORY OF TOBACCO USE, PRESENTING HAZARDS TO HEALTH: ICD-10-CM

## 2022-01-07 DIAGNOSIS — K52.9 CHRONIC DIARRHEA: ICD-10-CM

## 2022-01-07 DIAGNOSIS — N18.32 STAGE 3B CHRONIC KIDNEY DISEASE (H): ICD-10-CM

## 2022-01-07 DIAGNOSIS — N18.32 CHRONIC KIDNEY DISEASE (CKD) STAGE G3B/A1, MODERATELY DECREASED GLOMERULAR FILTRATION RATE (GFR) BETWEEN 30-44 ML/MIN/1.73 SQUARE METER AND ALBUMINURIA CREATININE RATIO LESS THAN 30 MG/G (H): ICD-10-CM

## 2022-01-07 DIAGNOSIS — R19.7 DIARRHEA, UNSPECIFIED TYPE: ICD-10-CM

## 2022-01-07 DIAGNOSIS — N17.9 ACUTE RENAL FAILURE, UNSPECIFIED ACUTE RENAL FAILURE TYPE (H): ICD-10-CM

## 2022-01-07 PROBLEM — I95.89 IATROGENIC HYPOTENSION: Status: ACTIVE | Noted: 2022-01-07

## 2022-01-07 PROBLEM — M94.9 DISORDER OF BONE AND CARTILAGE: Status: ACTIVE | Noted: 2022-01-07

## 2022-01-07 PROBLEM — M89.9 DISORDER OF BONE AND CARTILAGE: Status: ACTIVE | Noted: 2022-01-07

## 2022-01-07 PROBLEM — Z96.651 S/P TOTAL KNEE ARTHROPLASTY, RIGHT: Status: ACTIVE | Noted: 2020-09-15

## 2022-01-07 LAB
ALBUMIN UR-MCNC: 30 MG/DL
ANION GAP SERPL CALCULATED.3IONS-SCNC: 14 MMOL/L (ref 3–14)
ANION GAP SERPL CALCULATED.3IONS-SCNC: 15 MMOL/L (ref 3–14)
APPEARANCE UR: CLEAR
BACTERIA #/AREA URNS HPF: ABNORMAL /HPF
BASOPHILS # BLD AUTO: 0.1 10E3/UL (ref 0–0.2)
BASOPHILS NFR BLD AUTO: 1 %
BILIRUB UR QL STRIP: NEGATIVE
BUN SERPL-MCNC: 89 MG/DL (ref 7–30)
BUN SERPL-MCNC: 90 MG/DL (ref 7–30)
CALCIUM SERPL-MCNC: 7.9 MG/DL (ref 8.5–10.1)
CALCIUM SERPL-MCNC: 8.6 MG/DL (ref 8.5–10.1)
CHLORIDE BLD-SCNC: 103 MMOL/L (ref 94–109)
CHLORIDE BLD-SCNC: 106 MMOL/L (ref 94–109)
CO2 SERPL-SCNC: 12 MMOL/L (ref 20–32)
CO2 SERPL-SCNC: 13 MMOL/L (ref 20–32)
COLOR UR AUTO: YELLOW
CREAT SERPL-MCNC: 9.16 MG/DL (ref 0.52–1.04)
CREAT SERPL-MCNC: 9.8 MG/DL (ref 0.52–1.04)
EOSINOPHIL # BLD AUTO: 0.1 10E3/UL (ref 0–0.7)
EOSINOPHIL NFR BLD AUTO: 1 %
ERYTHROCYTE [DISTWIDTH] IN BLOOD BY AUTOMATED COUNT: 13 % (ref 10–15)
GFR SERPL CREATININE-BSD FRML MDRD: 4 ML/MIN/1.73M2
GFR SERPL CREATININE-BSD FRML MDRD: 4 ML/MIN/1.73M2
GLUCOSE BLD-MCNC: 79 MG/DL (ref 70–99)
GLUCOSE BLD-MCNC: 89 MG/DL (ref 70–99)
GLUCOSE UR STRIP-MCNC: NEGATIVE MG/DL
HCT VFR BLD AUTO: 47.3 % (ref 35–47)
HGB BLD-MCNC: 15.6 G/DL (ref 11.7–15.7)
HGB UR QL STRIP: ABNORMAL
IMM GRANULOCYTES # BLD: 0 10E3/UL
IMM GRANULOCYTES NFR BLD: 0 %
KETONES UR STRIP-MCNC: NEGATIVE MG/DL
LEUKOCYTE ESTERASE UR QL STRIP: NEGATIVE
LYMPHOCYTES # BLD AUTO: 2.6 10E3/UL (ref 0.8–5.3)
LYMPHOCYTES NFR BLD AUTO: 29 %
MCH RBC QN AUTO: 31.8 PG (ref 26.5–33)
MCHC RBC AUTO-ENTMCNC: 33 G/DL (ref 31.5–36.5)
MCV RBC AUTO: 96 FL (ref 78–100)
MONOCYTES # BLD AUTO: 0.7 10E3/UL (ref 0–1.3)
MONOCYTES NFR BLD AUTO: 8 %
NEUTROPHILS # BLD AUTO: 5.4 10E3/UL (ref 1.6–8.3)
NEUTROPHILS NFR BLD AUTO: 61 %
NITRATE UR QL: NEGATIVE
NRBC # BLD AUTO: 0 10E3/UL
NRBC BLD AUTO-RTO: 0 /100
PH UR STRIP: 5 [PH] (ref 5–7)
PLATELET # BLD AUTO: 240 10E3/UL (ref 150–450)
POTASSIUM BLD-SCNC: 3.5 MMOL/L (ref 3.4–5.3)
POTASSIUM BLD-SCNC: 3.6 MMOL/L (ref 3.4–5.3)
RBC # BLD AUTO: 4.91 10E6/UL (ref 3.8–5.2)
RBC URINE: 1 /HPF
SARS-COV-2 RNA RESP QL NAA+PROBE: NEGATIVE
SODIUM SERPL-SCNC: 130 MMOL/L (ref 133–144)
SODIUM SERPL-SCNC: 133 MMOL/L (ref 133–144)
SODIUM UR-SCNC: 23 MMOL/L
SP GR UR STRIP: 1.01 (ref 1–1.03)
SQUAMOUS EPITHELIAL: 1 /HPF
TROPONIN I SERPL HS-MCNC: 26 NG/L
UROBILINOGEN UR STRIP-MCNC: NORMAL MG/DL
WBC # BLD AUTO: 8.8 10E3/UL (ref 4–11)
WBC URINE: 5 /HPF

## 2022-01-07 PROCEDURE — 36415 COLL VENOUS BLD VENIPUNCTURE: CPT | Performed by: EMERGENCY MEDICINE

## 2022-01-07 PROCEDURE — 258N000003 HC RX IP 258 OP 636: Performed by: EMERGENCY MEDICINE

## 2022-01-07 PROCEDURE — 99285 EMERGENCY DEPT VISIT HI MDM: CPT | Performed by: EMERGENCY MEDICINE

## 2022-01-07 PROCEDURE — 81001 URINALYSIS AUTO W/SCOPE: CPT | Performed by: EMERGENCY MEDICINE

## 2022-01-07 PROCEDURE — C9803 HOPD COVID-19 SPEC COLLECT: HCPCS | Performed by: EMERGENCY MEDICINE

## 2022-01-07 PROCEDURE — 80048 BASIC METABOLIC PNL TOTAL CA: CPT | Performed by: EMERGENCY MEDICINE

## 2022-01-07 PROCEDURE — 99285 EMERGENCY DEPT VISIT HI MDM: CPT | Mod: 25 | Performed by: EMERGENCY MEDICINE

## 2022-01-07 PROCEDURE — 87635 SARS-COV-2 COVID-19 AMP PRB: CPT | Performed by: EMERGENCY MEDICINE

## 2022-01-07 PROCEDURE — 70450 CT HEAD/BRAIN W/O DYE: CPT

## 2022-01-07 PROCEDURE — 85025 COMPLETE CBC W/AUTO DIFF WBC: CPT | Performed by: EMERGENCY MEDICINE

## 2022-01-07 PROCEDURE — 84484 ASSAY OF TROPONIN QUANT: CPT | Performed by: EMERGENCY MEDICINE

## 2022-01-07 PROCEDURE — 96360 HYDRATION IV INFUSION INIT: CPT | Mod: 59 | Performed by: EMERGENCY MEDICINE

## 2022-01-07 PROCEDURE — 76770 US EXAM ABDO BACK WALL COMP: CPT

## 2022-01-07 PROCEDURE — 84300 ASSAY OF URINE SODIUM: CPT | Performed by: EMERGENCY MEDICINE

## 2022-01-07 PROCEDURE — 51702 INSERT TEMP BLADDER CATH: CPT | Performed by: EMERGENCY MEDICINE

## 2022-01-07 PROCEDURE — 96361 HYDRATE IV INFUSION ADD-ON: CPT | Performed by: EMERGENCY MEDICINE

## 2022-01-07 PROCEDURE — 250N000013 HC RX MED GY IP 250 OP 250 PS 637: Performed by: EMERGENCY MEDICINE

## 2022-01-07 RX ORDER — ACETAMINOPHEN 325 MG/1
650 TABLET ORAL EVERY 4 HOURS PRN
Status: DISCONTINUED | OUTPATIENT
Start: 2022-01-07 | End: 2022-01-08 | Stop reason: HOSPADM

## 2022-01-07 RX ORDER — ONDANSETRON 2 MG/ML
4 INJECTION INTRAMUSCULAR; INTRAVENOUS
Status: DISCONTINUED | OUTPATIENT
Start: 2022-01-07 | End: 2022-01-08 | Stop reason: HOSPADM

## 2022-01-07 RX ORDER — DIMENHYDRINATE 50 MG
50 TABLET ORAL
Status: DISCONTINUED | OUTPATIENT
Start: 2022-01-07 | End: 2022-01-08 | Stop reason: HOSPADM

## 2022-01-07 RX ORDER — HYDROMORPHONE HYDROCHLORIDE 1 MG/ML
0.5 INJECTION, SOLUTION INTRAMUSCULAR; INTRAVENOUS; SUBCUTANEOUS
Status: DISCONTINUED | OUTPATIENT
Start: 2022-01-07 | End: 2022-01-08 | Stop reason: HOSPADM

## 2022-01-07 RX ORDER — SODIUM CHLORIDE 9 MG/ML
1000 INJECTION, SOLUTION INTRAVENOUS CONTINUOUS
Status: DISCONTINUED | OUTPATIENT
Start: 2022-01-07 | End: 2022-01-08 | Stop reason: HOSPADM

## 2022-01-07 RX ORDER — SODIUM CHLORIDE 9 MG/ML
INJECTION, SOLUTION INTRAVENOUS CONTINUOUS PRN
Status: DISCONTINUED | OUTPATIENT
Start: 2022-01-07 | End: 2022-01-07

## 2022-01-07 RX ADMIN — ACETAMINOPHEN 650 MG: 325 TABLET, FILM COATED ORAL at 17:35

## 2022-01-07 RX ADMIN — SODIUM CHLORIDE 1000 ML: 9 INJECTION, SOLUTION INTRAVENOUS at 13:45

## 2022-01-07 RX ADMIN — SODIUM CHLORIDE 1000 ML: 9 INJECTION, SOLUTION INTRAVENOUS at 20:32

## 2022-01-07 RX ADMIN — SODIUM CHLORIDE: 9 INJECTION, SOLUTION INTRAVENOUS at 17:38

## 2022-01-07 ASSESSMENT — ENCOUNTER SYMPTOMS
ENDOCRINE NEGATIVE: 1
DIZZINESS: 1
VOMITING: 1
RESPIRATORY NEGATIVE: 1
LIGHT-HEADEDNESS: 1
NAUSEA: 1
DIARRHEA: 1
HEMATOLOGIC/LYMPHATIC NEGATIVE: 1
PSYCHIATRIC NEGATIVE: 1
MUSCULOSKELETAL NEGATIVE: 1
CARDIOVASCULAR NEGATIVE: 1
EYES NEGATIVE: 1

## 2022-01-07 ASSESSMENT — MIFFLIN-ST. JEOR: SCORE: 1101.53

## 2022-01-07 NOTE — ED PROVIDER NOTES
History     Chief Complaint   Patient presents with     Hypotension     Pt reports lighheaded/dizziness since wednesday, pt reports she feels better when laying flat, pt reports he BP at home 77/43 at home, BP in triage 83/61     Syncope     Fall     HPI  Leticia Valiente is a 66 year old female who presents for evaluation with low blood pressure and a fall with intermittent dizziness and lightheadedness over the last 48 hours.  Medical records show a history of Graves' disease, hypertension, bilateral renal artery stenosis and stage IIIb chronic kidney disease.  Post ablative hypothyroidism.  Patient is currently prescribed Lipitor, Norvasc 5 mg daily, levothyroxine, lisinopril and metoprolol.  On my examination patient reports she has been dizzy and lightheaded over the last 48 hours.  Symptoms began while at work.  She had a near fall yesterday walking down steps that were carpeted in her home lost her footing but did not suffer any fall or trauma.  She has been evaluated for chronic diarrhea of unclear cause including abdominal imaging and stool studies have been negative to date.  She has a headache and reports abdominal discomfort. Patient reports she is scheduled for colonoscopy --1/27/22.    Allergies:  No Known Allergies    Problem List:    Patient Active Problem List    Diagnosis Date Noted     Disorder of bone and cartilage 01/07/2022     Priority: Medium     Formatting of this note might be different from the original.  Created by Conversion    Replacement Utility updated for latest IMO load       Iatrogenic hypotension 01/07/2022     Priority: Medium     Formatting of this note might be different from the original.  Created by Conversion       Stage 3b chronic kidney disease (H) 12/21/2021     Priority: Medium     Bilateral CARRILLO       Spell of altered cognition 12/21/2021     Priority: Medium     8.2014 - hospitalized at [facility] for PRES - hypertensive 210s, MRI consistent with PRES.  No further  spells since then       Postablative hypothyroidism 12/21/2021     Priority: Medium     S/P total knee arthroplasty, right 09/15/2020     Priority: Medium     Bilateral renal artery stenosis (H) 02/08/2012     Priority: Medium     Found on ultrasound 2/3/12 and had lightheadedness/hypotension reaction to lisinopril in past.  Follows with [facility] Dr. Irvin       Atherosclerosis of renal artery (H) 02/08/2012     Priority: Medium     Formatting of this note might be different from the original.  Created by [facility] Annotation: May 15 2012  2:15PM - Freida Jc: as of 5/3/12,   angioplasty and stenting not recommended; focusing on risk management  Formatting of this note might be different from the original.  Overview:   Found on ultrasound 2/3/12 and had lightheadedness/hypotension reaction to lisinopril in past.  Seen by Dr. Isaacs 2/2/12 for CKD       Vitamin D deficiency 03/22/2010     Priority: Medium     Grave's disease 04/06/2009     Priority: Medium     Dx ~1979 postpartum.  S/p radioablation therapy x 1 and has been on replacement since then  S/p surgery of eyes x 2 for exopthalmos  IMO update changed this record. Please review for accuracy       Essential hypertension 04/06/2009     Priority: Medium     Menopause      Priority: Medium     ETOH abuse 09/21/2008     Priority: Medium     Tobacco dependence 06/18/2007     Priority: Medium        Past Medical History:    Past Medical History:   Diagnosis Date     Arthritis      Chronic alcohol abuse      Chronic kidney disease      CKD (chronic kidney disease) stage 4, GFR 15-29 ml/min (H) 1/25/2011     Convulsions (H)      Depressive disorder, not elsewhere classified 11/27/08     Disease of thyroid gland      Graves disease      Graves disease      Hypertension      Menopause      Other convulsions      Unspecified disorder of kidney and ureter      Unspecified essential hypertension      Unspecified hypothyroidism         Past Surgical History:    Past Surgical History:   Procedure Laterality Date     BIOPSY VULVA Left 6/25/2019    Procedure: EXCISION OF LEFT GLUTEAL MASS;  Surgeon: Tadeo Childers DO;  Location: [facility];  Service: General     C THYROID ABLATION      radioactive ablation due to Graves     COLONOSCOPY  09/15/09     EYE SURGERY       HC REPAIR OF HAMMERTOE,ONE       HC REVISION OF UPPER EYELID      eyelid implants due to Graves disease     JOINT REPLACEMENT       KIDNEY STONE SURGERY       TONSILLECTOMY       TONSILLECTOMY       ZZC AFF EYE MUSCLE SURG PROC UNLISTED      superior muscle release, bilateral     ZZC REMOVAL OF KIDNEY STONE       ZC TOTAL KNEE ARTHROPLASTY Right 9/14/2020    Procedure: TOTAL KNEE ARTHROPLASTY RIGHT;  Surgeon: Adithya Nascimento DO;  Location: [facility];  Service: Orthopedics     [facility] TOTAL KNEE ARTHROPLASTY Left 12/3/2020    Procedure: TOTAL KNEE ARTHROPLASTY LEFT;  Surgeon: Adithya Nascimento DO;  Location: [facility];  Service: Orthopedics       Family History:    Family History   Problem Relation Age of Onset     Musculoskeletal Disorder Father         multiple sclerosis     ALS Father      Diabetes Mother         Type II     Cerebrovascular Disease Mother 90.00     Arthritis Mother         OA, s/p TKA x 2.      Neurologic Disorder Mother      Hypertension Mother      Breast Cancer Cousin         maternal     Breast Cancer Cousin         maternal     Pancreatic Cancer Cousin      Seizure Disorder Brother      Seizure Disorder Brother      Hypertension Brother      Hyperlipidemia Brother      Seizure Disorder Paternal Aunt      Diabetes Maternal Grandfather      Cerebrovascular Disease Brother      Other Cancer Cousin      Thyroid Disease Maternal Grandmother      Anesthesia Reaction No family hx of        Social History:  Marital Status:  Single [1]  Social History     Tobacco Use     Smoking status: Current Some Day Smoker     Packs/day: 0.25     Years: 50.00  "    Pack years: 12.50     Types: Cigarettes     Last attempt to quit: 3/1/2020     Years since quittin.8     Smokeless tobacco: Current User     Tobacco comment: smokes 2 cigarettes per day, also using 5-6 mL nicotine in ecig   Substance Use Topics     Alcohol use: Yes     Comment: Recently quit, going through treatment     Drug use: No        Medications:    amLODIPine (NORVASC) 5 MG tablet  atorvastatin (LIPITOR) 40 MG tablet  levothyroxine (SYNTHROID/LEVOTHROID) 100 MCG tablet  lisinopril (ZESTRIL) 5 MG tablet  metoprolol succinate ER (TOPROL-XL) 25 MG 24 hr tablet          Review of Systems   Constitutional:        Fall at home last night   HENT: Negative.    Eyes: Negative.    Respiratory: Negative.    Cardiovascular: Negative.    Gastrointestinal: Positive for diarrhea (chronic), nausea and vomiting.   Endocrine: Negative.    Genitourinary: Negative.    Musculoskeletal: Negative.    Skin: Negative.    Neurological: Positive for dizziness and light-headedness.   Hematological: Negative.    Psychiatric/Behavioral: Negative.    All other systems reviewed and are negative.      Physical Exam   BP: (!) 83/61  Pulse: 79  Temp: 97.1  F (36.2  C)  Resp: 18  Height: 162.6 cm (5' 4\")  Weight: 57.2 kg (126 lb)  SpO2: 100 %      Physical Exam  Constitutional:       General: She is not in acute distress.     Appearance: Normal appearance. She is not ill-appearing, toxic-appearing or diaphoretic.   HENT:      Head: Normocephalic and atraumatic.      Nose: Nose normal.   Eyes:      Extraocular Movements: Extraocular movements intact.      Pupils: Pupils are equal, round, and reactive to light.   Cardiovascular:      Rate and Rhythm: Normal rate.      Pulses: Normal pulses.   Pulmonary:      Effort: Pulmonary effort is normal. No respiratory distress.      Breath sounds: Normal breath sounds. No stridor. No wheezing, rhonchi or rales.   Chest:      Chest wall: No tenderness.   Musculoskeletal:         General: No " swelling, tenderness, deformity or signs of injury.      Cervical back: Normal range of motion and neck supple.      Right lower leg: No edema.      Left lower leg: No edema.   Skin:     Capillary Refill: Capillary refill takes less than 2 seconds.      Coloration: Skin is not jaundiced or pale.      Findings: No bruising, lesion or rash.   Neurological:      General: No focal deficit present.      Mental Status: She is alert and oriented to person, place, and time.      Cranial Nerves: No cranial nerve deficit.      Sensory: No sensory deficit.      Motor: No weakness.      Coordination: Coordination normal.      Gait: Gait normal.      Deep Tendon Reflexes: Reflexes normal.   Psychiatric:         Mood and Affect: Mood normal.         Behavior: Behavior normal.         Thought Content: Thought content normal.         Judgment: Judgment normal.         ED Course                 Procedures              Critical Care time:  none               ED medications:  Medications   0.9% sodium chloride BOLUS (has no administration in time range)   sodium chloride 0.9% infusion ( Intravenous New Bag 1/7/22 1738)   ondansetron (ZOFRAN) injection 4 mg (has no administration in time range)   dimenhyDRINATE (DRAMAMINE) tablet 50 mg (has no administration in time range)   0.9% sodium chloride BOLUS (has no administration in time range)   sodium chloride 0.9% infusion (has no administration in time range)   acetaminophen (TYLENOL) tablet 650 mg (650 mg Oral Given 1/7/22 1735)   HYDROmorphone (PF) (DILAUDID) injection 0.5 mg (has no administration in time range)   0.9% sodium chloride BOLUS (1,000 mLs Intravenous New Bag 1/7/22 1345)       ED vitals:  Vitals:    01/07/22 1500 01/07/22 1637 01/07/22 1700 01/07/22 1730   BP: (!) 80/53 91/63 100/65 99/62   Pulse: 58 66 68 67   Resp:       Temp:       TempSrc:       SpO2: 97%  98% 100%   Weight:       Height:             ED labs and imaging:  Results for orders placed or performed during  the hospital encounter of 01/07/22   CT Head w/o Contrast     Status: None    Narrative    CT SCAN OF THE HEAD WITHOUT CONTRAST   1/7/2022 3:29 PM     HISTORY: Dizziness, non-specific. 48 hours of dizziness with near  fall. Hypotension. Evaluate for acute intracranial process. Possible  stroke.    TECHNIQUE:  Axial images of the head and coronal reformations without  IV contrast material. Radiation dose for this scan was reduced using  automated exposure control, adjustment of the mA and/or kV according  to patient size, or iterative reconstruction technique.    COMPARISON: Brain MRI dated 1/8/2009.    FINDINGS: The ventricles are normal in size and configuration. There  is mild generalized brain parenchymal volume loss. Mildly low-lying  cerebellar tonsils, as before, without other findings of Chiari I  malformation. Probable small dilated perivascular space along the  inferior posterior aspect of the right lentiform nucleus (series 2  image 10). Otherwise, the morphology and density of the brain  parenchyma appears within normal limits. No acute intracranial  hemorrhage, extra axial fluid collection, mass lesion or herniation.    The visualized portions of the sinuses and mastoids appear normal. The  bony calvarium and bones of the skull base appear intact.       Impression    IMPRESSION: No CT findings of acute intracranial process.    AMY TODD MD         SYSTEM ID:  [id number]   US Renal Complete     Status: None    Narrative    EXAM: US RENAL COMPLETE  LOCATION: [facility]  DATE/TIME: 1/7/2022 4:18 PM    INDICATION: Acute renal failure. History of stage-3b CKD.  COMPARISON: None.  TECHNIQUE: Routine Bilateral Renal and Bladder Ultrasound.    FINDINGS:    RIGHT KIDNEY: 8.3 x 4.9 x 3.9 cm. Small right kidney with increased  cortical echogenicity. No hydronephrosis, shadowing stone, or renal  mass.     LEFT KIDNEY: 8.5 x 4.3 x 5.1 cm. Small left kidney with increased  cortical  echogenicity. No hydronephrosis, shadowing stone, or renal  mass.     BLADDER: Normal.      Impression    IMPRESSION:  1.  Increased renal echogenicity suggestive of medical renal disease.  No hydronephrosis.    CARLA SHOEMAKER MD         SYSTEM ID:  [id number]   Basic metabolic panel     Status: Abnormal   Result Value Ref Range    Sodium 130 (L) 133 - 144 mmol/L    Potassium 3.6 3.4 - 5.3 mmol/L    Chloride 103 94 - 109 mmol/L    Carbon Dioxide (CO2) 12 (L) 20 - 32 mmol/L    Anion Gap 15 (H) 3 - 14 mmol/L    Urea Nitrogen 89 (H) 7 - 30 mg/dL    Creatinine 9.80 (H) 0.52 - 1.04 mg/dL    Calcium 8.6 8.5 - 10.1 mg/dL    Glucose 89 70 - 99 mg/dL    GFR Estimate 4 (L) >60 mL/min/1.73m2   Troponin I     Status: Normal   Result Value Ref Range    Troponin I High Sensitivity 26 <54 ng/L   CBC with platelets and differential     Status: Abnormal   Result Value Ref Range    WBC Count 8.8 4.0 - 11.0 10e3/uL    RBC Count 4.91 3.80 - 5.20 10e6/uL    Hemoglobin 15.6 11.7 - 15.7 g/dL    Hematocrit 47.3 (H) 35.0 - 47.0 %    MCV 96 78 - 100 fL    MCH 31.8 26.5 - 33.0 pg    MCHC 33.0 31.5 - 36.5 g/dL    RDW 13.0 10.0 - 15.0 %    Platelet Count 240 150 - 450 10e3/uL    % Neutrophils 61 %    % Lymphocytes 29 %    % Monocytes 8 %    % Eosinophils 1 %    % Basophils 1 %    % Immature Granulocytes 0 %    NRBCs per 100 WBC 0 <1 /100    Absolute Neutrophils 5.4 1.6 - 8.3 10e3/uL    Absolute Lymphocytes 2.6 0.8 - 5.3 10e3/uL    Absolute Monocytes 0.7 0.0 - 1.3 10e3/uL    Absolute Eosinophils 0.1 0.0 - 0.7 10e3/uL    Absolute Basophils 0.1 0.0 - 0.2 10e3/uL    Absolute Immature Granulocytes 0.0 <=0.4 10e3/uL    Absolute NRBCs 0.0 10e3/uL   UA with Microscopic reflex to Culture     Status: Abnormal    Specimen: Urine, Catheter   Result Value Ref Range    Color Urine Yellow Colorless, Straw, Light Yellow, Yellow    Appearance Urine Clear Clear    Glucose Urine Negative Negative mg/dL    Bilirubin Urine Negative Negative    Ketones Urine  Negative Negative mg/dL    Specific Gravity Urine 1.010 1.003 - 1.035    Blood Urine Small (A) Negative    pH Urine 5.0 5.0 - 7.0    Protein Albumin Urine 30  (A) Negative mg/dL    Urobilinogen Urine Normal Normal, 2.0 mg/dL    Nitrite Urine Negative Negative    Leukocyte Esterase Urine Negative Negative    Bacteria Urine Few (A) None Seen /HPF    RBC Urine 1 <=2 /HPF    WBC Urine 5 <=5 /HPF    Squamous Epithelials Urine 1 <=1 /HPF    Narrative    Urine Culture not indicated   Sodium random urine     Status: None   Result Value Ref Range    Sodium Urine mmol/L 23 mmol/L   Asymptomatic COVID-19 Virus (Coronavirus) by PCR Nasopharyngeal     Status: Normal    Specimen: Nasopharyngeal; Swab   Result Value Ref Range    SARS CoV2 PCR Negative Negative    Narrative    Testing was performed using the elly  SARS-CoV-2 & Influenza A/B Assay on the elly  Tova  System.  This test should be ordered for the detection of SARS-COV-2 in individuals who meet SARS-CoV-2 clinical and/or epidemiological criteria. Test performance is unknown in asymptomatic patients.  This test is for in vitro diagnostic use under the FDA EUA for laboratories certified under CLIA to perform moderate and/or high complexity testing. This test has not been FDA cleared or approved.  A negative test does not rule out the presence of PCR inhibitors in the specimen or target RNA in concentration below the limit of detection for the assay. The possibility of a false negative should be considered if the patient's recent exposure or clinical presentation suggests COVID-19.  [facility] are certified under the Clinical Laboratory Improvement Amendments of 1988 (CLIA-88) as qualified to perform moderate and/or high complexity laboratory testing.   Basic metabolic panel     Status: Abnormal   Result Value Ref Range    Sodium 133 133 - 144 mmol/L    Potassium 3.5 3.4 - 5.3 mmol/L    Chloride 106 94 - 109 mmol/L    Carbon Dioxide (CO2) 13 (L) 20 -  32 mmol/L    Anion Gap 14 3 - 14 mmol/L    Urea Nitrogen 90 (H) 7 - 30 mg/dL    Creatinine 9.16 (H) 0.52 - 1.04 mg/dL    Calcium 7.9 (L) 8.5 - 10.1 mg/dL    Glucose 79 70 - 99 mg/dL    GFR Estimate 4 (L) >60 mL/min/1.73m2   CBC with Platelets & Differential     Status: Abnormal    Narrative    The following orders were created for panel order CBC with Platelets & Differential.  Procedure                               Abnormality         Status                     ---------                               -----------         ------                     CBC with platelets and d...[[id number]]  Abnormal            Final result                 Please view results for these tests on the individual orders.         Assessments & Plan (with Medical Decision Making)   Assessment Summary and Clinical Impression: 66-year-old female with a history of bilateral renal artery stenosis, stage IIIb chronic kidney disease post ablative hypothyroidism, hypertension Graves' disease who presented with intermittent lightheadedness and dizziness over the last 48 hours.  On exam she appeared ill but is nontoxic.  She was hypotensive.  She had recently been evaluated for chronic diarrhea and had negative Clostridium and enteric bacterial pathogens completed on December 31, 2021.  She was noted to have a creatinine of 1.68 on December 21.  Given dizziness and lightheadedness patient's age and comorbidities neuroimaging was obtained to exclude any acute neurologic process given report of facial numbness my examination although speech was normal and no extremity weakness.  I suspect her nausea vomiting diarrhea lightheadedness and feeling unwell with hypotension may be due to her renal failure query URI on CKD.  She will benefit from transfer to a facility with nephrology.  At shift end patient is boarding the emergency department need of inpatient bed ideally to a facility with his nephrology consultation available with a goal to monitor her  urine output including intake while receiving IV fluids and trending her renal function.    ED course and Plan:  Reviewed the medical record.  Abdominal imaging December 31, 2021 for episode of diarrhea.  She was given fluids.  She was also offered therapies to help manage her symptoms as described and reported. Given her history of stage IIIb chronic kidney disease blood work was obtained    Her work-up today revealed acute kidney injury concern for renal failure with elevated BUN thankfully her potassium was normal. Creatinine is 9.80 was 1.68 on 12/21/21.     Her sodium was 130. Troponin was within normal limits. Normal white count.     With what appears to be acute renal failure although there is no evidence of fluid overload or uremia although her BUN is elevated I reviewed her presentation with nephrology.    I spoke with Dr Joshi- nephrology at 3.12pm- we discussed the role of emergent dialysis and nephrology consultation.  Dr. Joshi advised urine protein creatinine ratio, urine sodium, monitor urine output with Rodriguez catheterization and renal ultrasound.  If no interval improvement in renal function patient would definitely benefit from further intervention.    Head CT revealed no acute intracranial hemorrhage or process.  I canceled her CTA given presumed URI on CKD with concern for renal failure pending renal ultrasound, and further intervention care    Comprehensive renal ultrasound revealed increased renal echogenicity suspicious of medical renal disease without hydronephrosis.  See additional details in the medical record.  Patient had about 100 mL of urine output.  A repeat metabolic profile showed some improvement in her renal function and her creatinine is 9.1 improved from 9.8.  Urine sodium was 23.    At shift end patient at 7pm Patient signed out to Dr ELIZABETH Barrett.  At hand-off plan of care is that urine output and intake will be monitored every 2 hours during the shift.  Plan is for repeat basic  metabolic profile at 2300 and in the morning.  She received a total of 2 L normal saline and has IV fluids at 125/h.  Plan is to follow-up on her BMP in the morning and then hopefully a bed can be secured where she can be transferred for further intervention evaluation by nephrology if medically required.      Disclaimer: This note consists of symbols derived from keyboarding, dictation and/or voice recognition software. As a result, there may be errors in the script that have gone undetected. Please consider this when interpreting information found in this chart.  I have reviewed the nursing notes.    I have reviewed the findings, diagnosis, plan and need for follow up with the patient.       New Prescriptions    No medications on file       Final diagnoses:   Acute renal failure, unspecified acute renal failure type (H)   Stage 3b chronic kidney disease (H)   Chronic diarrhea       1/7/2022   [facility] EMERGENCY DEPT     Hansel Juarez MD  01/07/22 0115

## 2022-01-07 NOTE — ED NOTES
Hypotensive since Wednesday with light-headedness.  Patient is currently taking medication for hypertension and did take today.  Patient denies chest pain.

## 2022-01-08 VITALS
RESPIRATION RATE: 18 BRPM | DIASTOLIC BLOOD PRESSURE: 55 MMHG | BODY MASS INDEX: 21.51 KG/M2 | TEMPERATURE: 98.7 F | HEART RATE: 65 BPM | HEIGHT: 64 IN | SYSTOLIC BLOOD PRESSURE: 90 MMHG | WEIGHT: 126 LBS | OXYGEN SATURATION: 100 %

## 2022-01-08 PROBLEM — K52.9 CHRONIC DIARRHEA: Status: ACTIVE | Noted: 2022-01-08

## 2022-01-08 PROBLEM — N18.32 STAGE 3B CHRONIC KIDNEY DISEASE (H): Status: ACTIVE | Noted: 2021-12-21

## 2022-01-08 PROBLEM — N17.9 ACUTE RENAL FAILURE, UNSPECIFIED ACUTE RENAL FAILURE TYPE (H): Status: ACTIVE | Noted: 2022-01-08

## 2022-01-08 LAB
ANION GAP SERPL CALCULATED.3IONS-SCNC: 10 MMOL/L (ref 3–14)
ANION GAP SERPL CALCULATED.3IONS-SCNC: 11 MMOL/L (ref 3–14)
ANION GAP SERPL CALCULATED.3IONS-SCNC: 12 MMOL/L (ref 3–14)
BUN SERPL-MCNC: 79 MG/DL (ref 7–30)
BUN SERPL-MCNC: 83 MG/DL (ref 7–30)
BUN SERPL-MCNC: 88 MG/DL (ref 7–30)
CA-I BLD-MCNC: 4.4 MG/DL (ref 4.4–5.2)
CALCIUM SERPL-MCNC: 7.1 MG/DL (ref 8.5–10.1)
CALCIUM SERPL-MCNC: 7.5 MG/DL (ref 8.5–10.1)
CALCIUM SERPL-MCNC: 7.7 MG/DL (ref 8.5–10.1)
CHLORIDE BLD-SCNC: 110 MMOL/L (ref 94–109)
CHLORIDE BLD-SCNC: 120 MMOL/L (ref 94–109)
CHLORIDE BLD-SCNC: 121 MMOL/L (ref 94–109)
CO2 SERPL-SCNC: 11 MMOL/L (ref 20–32)
CO2 SERPL-SCNC: 12 MMOL/L (ref 20–32)
CO2 SERPL-SCNC: 13 MMOL/L (ref 20–32)
COLLECT DURATION TIME UR: 12 H
CREAT 24H UR-MRATE: 1.85 G/SPEC (ref 0.8–1.8)
CREAT SERPL-MCNC: 5.96 MG/DL (ref 0.52–1.04)
CREAT SERPL-MCNC: 6.78 MG/DL (ref 0.52–1.04)
CREAT SERPL-MCNC: 8.38 MG/DL (ref 0.52–1.04)
CREAT UR-MCNC: 37 MG/DL
GFR SERPL CREATININE-BSD FRML MDRD: 5 ML/MIN/1.73M2
GFR SERPL CREATININE-BSD FRML MDRD: 6 ML/MIN/1.73M2
GFR SERPL CREATININE-BSD FRML MDRD: 7 ML/MIN/1.73M2
GLUCOSE BLD-MCNC: 91 MG/DL (ref 70–99)
GLUCOSE BLD-MCNC: 92 MG/DL (ref 70–99)
GLUCOSE BLD-MCNC: 98 MG/DL (ref 70–99)
POTASSIUM BLD-SCNC: 3.5 MMOL/L (ref 3.4–5.3)
POTASSIUM BLD-SCNC: 3.6 MMOL/L (ref 3.4–5.3)
POTASSIUM BLD-SCNC: 3.8 MMOL/L (ref 3.4–5.3)
PROT 24H UR-MRATE: 0.95 G/SPEC (ref 0.04–0.23)
PROT UR-MCNC: 0.19 G/L
PROT/CREAT 24H UR: 0.51 G/G CR (ref 0–0.2)
SODIUM SERPL-SCNC: 134 MMOL/L (ref 133–144)
SODIUM SERPL-SCNC: 142 MMOL/L (ref 133–144)
SODIUM SERPL-SCNC: 144 MMOL/L (ref 133–144)
SPECIMEN VOL UR: 2500 ML

## 2022-01-08 PROCEDURE — 80048 BASIC METABOLIC PNL TOTAL CA: CPT | Performed by: EMERGENCY MEDICINE

## 2022-01-08 PROCEDURE — 36415 COLL VENOUS BLD VENIPUNCTURE: CPT | Performed by: EMERGENCY MEDICINE

## 2022-01-08 PROCEDURE — 250N000013 HC RX MED GY IP 250 OP 250 PS 637: Performed by: EMERGENCY MEDICINE

## 2022-01-08 PROCEDURE — 82310 ASSAY OF CALCIUM: CPT | Performed by: EMERGENCY MEDICINE

## 2022-01-08 PROCEDURE — 82330 ASSAY OF CALCIUM: CPT | Performed by: EMERGENCY MEDICINE

## 2022-01-08 PROCEDURE — 258N000003 HC RX IP 258 OP 636: Performed by: EMERGENCY MEDICINE

## 2022-01-08 PROCEDURE — 84156 ASSAY OF PROTEIN URINE: CPT | Performed by: STUDENT IN AN ORGANIZED HEALTH CARE EDUCATION/TRAINING PROGRAM

## 2022-01-08 PROCEDURE — 120N000001 HC R&B MED SURG/OB

## 2022-01-08 PROCEDURE — 81050 URINALYSIS VOLUME MEASURE: CPT | Performed by: STUDENT IN AN ORGANIZED HEALTH CARE EDUCATION/TRAINING PROGRAM

## 2022-01-08 RX ORDER — ATORVASTATIN CALCIUM 20 MG/1
40 TABLET, FILM COATED ORAL DAILY
Status: DISCONTINUED | OUTPATIENT
Start: 2022-01-08 | End: 2022-01-08 | Stop reason: HOSPADM

## 2022-01-08 RX ORDER — LEVOTHYROXINE SODIUM 100 UG/1
100 TABLET ORAL DAILY
Status: DISCONTINUED | OUTPATIENT
Start: 2022-01-08 | End: 2022-01-08 | Stop reason: HOSPADM

## 2022-01-08 RX ADMIN — SODIUM CHLORIDE 1000 ML: 9 INJECTION, SOLUTION INTRAVENOUS at 00:51

## 2022-01-08 RX ADMIN — ATORVASTATIN CALCIUM 40 MG: 20 TABLET, FILM COATED ORAL at 09:21

## 2022-01-08 RX ADMIN — ACETAMINOPHEN 650 MG: 325 TABLET, FILM COATED ORAL at 04:31

## 2022-01-08 RX ADMIN — LEVOTHYROXINE SODIUM 100 MCG: 0.1 TABLET ORAL at 09:21

## 2022-01-08 ASSESSMENT — ACTIVITIES OF DAILY LIVING (ADL)
ADLS_ACUITY_SCORE: 10

## 2022-01-08 NOTE — ED PROVIDER NOTES
"     Emergency Department Patient Sign-out     Brief HPI:  This is a 66 year old female signed out to me by Dr. Juarez.  Please see initial provider note for details of the presentation. In brief, patient presented to the department for evaluation after fall with complaint of lightheadedness over the past 48 hours. She has a documented history of bilateral renal artery stenosis and stage III CKD but was found to have significantly elevated creatinine value of 9.8 differing from baseline of 1.68 as of 12/2021. Initial provider consulted [facility] nephrolog[facility] Dr. Joshi who recommended monitoring urine output via Rodriguez catheter and consider transfer to the hospital capable of nephrology consultation. Unfortunately during the stage of COVID pandemic there is no bed availability throughout the state and patient will require boarding in the department until higher level of care can be achieved.    \"At hand-off plan of care is that urine output and intake will be monitored every 2 hours during the shift.  Plan is for repeat basic metabolic profile at 2300 and in the morning.  She received a total of 2 L normal saline and has IV fluids at 125/h.  Plan is to follow-up on her BMP in the morning and then hopefully a bed can be secured where she can be transferred for further intervention evaluation by nephrology if medically required.\"        Exam:   Patient Vitals for the past 24 hrs:   BP Temp Temp src Pulse Resp SpO2 Height Weight   01/07/22 1730 99/62 -- -- 67 -- 100 % -- --   01/07/22 1700 100/65 -- -- 68 -- 98 % -- --   01/07/22 1637 91/63 -- -- 66 -- -- -- --   01/07/22 1500 (!) 80/53 -- -- 58 -- 97 % -- --   01/07/22 1430 (!) 77/43 -- -- 61 -- -- -- --   01/07/22 1415 (!) 84/57 -- -- 56 -- (!) 83 % -- --   01/07/22 1400 (!) 76/55 -- -- 61 -- 96 % -- --   01/07/22 1134 (!) 83/61 97.1  F (36.2  C) Temporal 79 18 100 % 1.626 m (5' 4\") 57.2 kg (126 lb)         ED RESULTS:   Results for orders placed or performed during " the hospital encounter of 01/07/22 (from the past 24 hour(s))   CBC with Platelets & Differential     Status: Abnormal    Collection Time: 01/07/22  1:41 PM    Narrative    The following orders were created for panel order CBC with Platelets & Differential.  Procedure                               Abnormality         Status                     ---------                               -----------         ------                     CBC with platelets and d...[[id number]]  Abnormal            Final result                 Please view results for these tests on the individual orders.   Basic metabolic panel     Status: Abnormal    Collection Time: 01/07/22  1:41 PM   Result Value Ref Range    Sodium 130 (L) 133 - 144 mmol/L    Potassium 3.6 3.4 - 5.3 mmol/L    Chloride 103 94 - 109 mmol/L    Carbon Dioxide (CO2) 12 (L) 20 - 32 mmol/L    Anion Gap 15 (H) 3 - 14 mmol/L    Urea Nitrogen 89 (H) 7 - 30 mg/dL    Creatinine 9.80 (H) 0.52 - 1.04 mg/dL    Calcium 8.6 8.5 - 10.1 mg/dL    Glucose 89 70 - 99 mg/dL    GFR Estimate 4 (L) >60 mL/min/1.73m2   Troponin I     Status: Normal    Collection Time: 01/07/22  1:41 PM   Result Value Ref Range    Troponin I High Sensitivity 26 <54 ng/L   CBC with platelets and differential     Status: Abnormal    Collection Time: 01/07/22  1:41 PM   Result Value Ref Range    WBC Count 8.8 4.0 - 11.0 10e3/uL    RBC Count 4.91 3.80 - 5.20 10e6/uL    Hemoglobin 15.6 11.7 - 15.7 g/dL    Hematocrit 47.3 (H) 35.0 - 47.0 %    MCV 96 78 - 100 fL    MCH 31.8 26.5 - 33.0 pg    MCHC 33.0 31.5 - 36.5 g/dL    RDW 13.0 10.0 - 15.0 %    Platelet Count 240 150 - 450 10e3/uL    % Neutrophils 61 %    % Lymphocytes 29 %    % Monocytes 8 %    % Eosinophils 1 %    % Basophils 1 %    % Immature Granulocytes 0 %    NRBCs per 100 WBC 0 <1 /100    Absolute Neutrophils 5.4 1.6 - 8.3 10e3/uL    Absolute Lymphocytes 2.6 0.8 - 5.3 10e3/uL    Absolute Monocytes 0.7 0.0 - 1.3 10e3/uL    Absolute Eosinophils 0.1 0.0 - 0.7 10e3/uL     Absolute Basophils 0.1 0.0 - 0.2 10e3/uL    Absolute Immature Granulocytes 0.0 <=0.4 10e3/uL    Absolute NRBCs 0.0 10e3/uL   CT Head w/o Contrast     Status: None    Collection Time: 01/07/22  3:29 PM    Narrative    CT SCAN OF THE HEAD WITHOUT CONTRAST   1/7/2022 3:29 PM     HISTORY: Dizziness, non-specific. 48 hours of dizziness with near  fall. Hypotension. Evaluate for acute intracranial process. Possible  stroke.    TECHNIQUE:  Axial images of the head and coronal reformations without  IV contrast material. Radiation dose for this scan was reduced using  automated exposure control, adjustment of the mA and/or kV according  to patient size, or iterative reconstruction technique.    COMPARISON: Brain MRI dated 1/8/2009.    FINDINGS: The ventricles are normal in size and configuration. There  is mild generalized brain parenchymal volume loss. Mildly low-lying  cerebellar tonsils, as before, without other findings of Chiari I  malformation. Probable small dilated perivascular space along the  inferior posterior aspect of the right lentiform nucleus (series 2  image 10). Otherwise, the morphology and density of the brain  parenchyma appears within normal limits. No acute intracranial  hemorrhage, extra axial fluid collection, mass lesion or herniation.    The visualized portions of the sinuses and mastoids appear normal. The  bony calvarium and bones of the skull base appear intact.       Impression    IMPRESSION: No CT findings of acute intracranial process.    AMY TODD MD         SYSTEM ID:  [id number]   US Renal Complete     Status: None    Collection Time: 01/07/22  4:56 PM    Narrative    EXAM: US RENAL COMPLETE  LOCATION: [facility]  DATE/TIME: 1/7/2022 4:18 PM    INDICATION: Acute renal failure. History of stage-3b CKD.  COMPARISON: None.  TECHNIQUE: Routine Bilateral Renal and Bladder Ultrasound.    FINDINGS:    RIGHT KIDNEY: 8.3 x 4.9 x 3.9 cm. Small right kidney with  increased  cortical echogenicity. No hydronephrosis, shadowing stone, or renal  mass.     LEFT KIDNEY: 8.5 x 4.3 x 5.1 cm. Small left kidney with increased  cortical echogenicity. No hydronephrosis, shadowing stone, or renal  mass.     BLADDER: Normal.      Impression    IMPRESSION:  1.  Increased renal echogenicity suggestive of medical renal disease.  No hydronephrosis.    CARLA SHOEMAKER MD         SYSTEM ID:  [id number]   Asymptomatic COVID-19 Virus (Coronavirus) by PCR Nasopharyngeal     Status: Normal    Collection Time: 01/07/22  5:12 PM    Specimen: Nasopharyngeal; Swab   Result Value Ref Range    SARS CoV2 PCR Negative Negative    Narrative    Testing was performed using the elly  SARS-CoV-2 & Influenza A/B Assay on the elly  [employer]  System.  This test should be ordered for the detection of SARS-COV-2 in individuals who meet SARS-CoV-2 clinical and/or epidemiological criteria. Test performance is unknown in asymptomatic patients.  This test is for in vitro diagnostic use under the FDA EUA for laboratories certified under CLIA to perform moderate and/or high complexity testing. This test has not been FDA cleared or approved.  A negative test does not rule out the presence of PCR inhibitors in the specimen or target RNA in concentration below the limit of detection for the assay. The possibility of a false negative should be considered if the patient's recent exposure or clinical presentation suggests COVID-19.  [facility] are certified under the Clinical Laboratory Improvement Amendments of 1988 (CLIA-88) as qualified to perform moderate and/or high complexity laboratory testing.   UA with Microscopic reflex to Culture     Status: Abnormal    Collection Time: 01/07/22  5:33 PM    Specimen: Urine, Catheter   Result Value Ref Range    Color Urine Yellow Colorless, Straw, Light Yellow, Yellow    Appearance Urine Clear Clear    Glucose Urine Negative Negative mg/dL    Bilirubin Urine Negative  Negative    Ketones Urine Negative Negative mg/dL    Specific Gravity Urine 1.010 1.003 - 1.035    Blood Urine Small (A) Negative    pH Urine 5.0 5.0 - 7.0    Protein Albumin Urine 30  (A) Negative mg/dL    Urobilinogen Urine Normal Normal, 2.0 mg/dL    Nitrite Urine Negative Negative    Leukocyte Esterase Urine Negative Negative    Bacteria Urine Few (A) None Seen /HPF    RBC Urine 1 <=2 /HPF    WBC Urine 5 <=5 /HPF    Squamous Epithelials Urine 1 <=1 /HPF    Narrative    Urine Culture not indicated   Sodium random urine     Status: None    Collection Time: 01/07/22  5:33 PM   Result Value Ref Range    Sodium Urine mmol/L 23 mmol/L   Basic metabolic panel     Status: Abnormal    Collection Time: 01/07/22  5:38 PM   Result Value Ref Range    Sodium 133 133 - 144 mmol/L    Potassium 3.5 3.4 - 5.3 mmol/L    Chloride 106 94 - 109 mmol/L    Carbon Dioxide (CO2) 13 (L) 20 - 32 mmol/L    Anion Gap 14 3 - 14 mmol/L    Urea Nitrogen 90 (H) 7 - 30 mg/dL    Creatinine 9.16 (H) 0.52 - 1.04 mg/dL    Calcium 7.9 (L) 8.5 - 10.1 mg/dL    Glucose 79 70 - 99 mg/dL    GFR Estimate 4 (L) >60 mL/min/1.73m2         ED MEDICATIONS:   Medications   0.9% sodium chloride BOLUS (has no administration in time range)   sodium chloride 0.9% infusion ( Intravenous New Bag 1/7/22 1738)   ondansetron (ZOFRAN) injection 4 mg (has no administration in time range)   dimenhyDRINATE (DRAMAMINE) tablet 50 mg (has no administration in time range)   0.9% sodium chloride BOLUS (has no administration in time range)   sodium chloride 0.9% infusion (has no administration in time range)   acetaminophen (TYLENOL) tablet 650 mg (650 mg Oral Given 1/7/22 1735)   HYDROmorphone (PF) (DILAUDID) injection 0.5 mg (has no administration in time range)   0.9% sodium chloride BOLUS (1,000 mLs Intravenous New Bag 1/7/22 1345)         Plan:    Signed out to overnight physician Dr. Rodriguez to continue with plan of monitoring and management until safe disposition can be  arranged.        Impression:    ICD-10-CM    1. Acute renal failure, unspecified acute renal failure type (H)  N17.9    2. Stage 3b chronic kidney disease (H)  N18.32    3. Chronic diarrhea  K52.9            DO Princess Pham Thomas, DO  01/07/22 2359

## 2022-01-08 NOTE — DISCHARGE INSTRUCTIONS
1) After your ED stay your kidney function is improved from 9.8 to 5.78. we have discussed that although your kidney function is improving and you are urinating your blood pressures remain somewhat stable we discussed that you are not completely back to baseline as it relates your renal function. You have elected to go home with plan to return in 48 hours for a lab draw to follow-up on your kidney function and your electrolyte panel.  The lab draw should be completed and should be reviewed results through [employer] and touch base with your clinic provider Dr. Kim Nascimento.  I have also sent Dr. Mcneil a message to notify her about plan of care and to help with follow-up.    2) I I am sorry that he had to boarding the emergency department and were not able to secure hospital bed during your visit although thankfully your symptoms improve during your ED stay.    3) recommend you consider drinking lots of fluids double to triple the amount you normally drink so that you continue to have increased improvement in your kidney function.  We also discussed the role of using antidiarrheal agents such as Imodium he can do 2 tabs every 4-6 hours.  If needed.  No more than 8 mg in 24 hours as long as your diarrhea is non-bloody.    4) if you are unable to secure a lab appointment for creatinine redrawn and electrolyte panel redrawn you should return to the emergency department to be reevaluated.  If he also not able to reach Dr. Mcneil to follow-up on your kidney function results you should be seen in the emergency department.  To help with follow-up I also placed a nephrology referral in the event that your kidney function does not return to baseline in the next 2 weeks.    5) you appear stable for discharge to home if you develop new symptoms of concern including fever, decreased urine output in 12 hours, dizziness or fainting or any new concerns you should return to be reevaluated

## 2022-01-08 NOTE — ED PROVIDER NOTES
"ED Observation Progress Note  [facility] Emergency Department  Note Date: 1/8/2022    Leticia Valiente MRN: [id number]   Age: 66 year old YOB: 1955     Interval History   66-year-old female with bilateral renal artery stenosis and stage III chronic kidney disease with significant worsening of her kidney function with a creatinine of 9.8 up from a baseline of 1.68 possibly related to diarrhea.  Given IV fluids, nephrology has been consulted and recommendations have been put in, she is making urine and feeling better.  No further diarrhea here so far.  Continues to improve.  Vital signs generally better.  Eating and voiding well.  Tolerating medications without significant side effects.  No new concerns today.      Physical Exam   BP (!) 58/49   Pulse 58   Temp 98.8  F (37.1  C) (Oral)   Resp 18   Ht 1.626 m (5' 4\")   Wt 57.2 kg (126 lb)   LMP  (LMP Unknown)   SpO2 98%   BMI 21.63 kg/m    Physical Exam    Results   All laboratory and imaging data in the past 24 hours reviewed   -   -     Assessments & Plan (with Medical Decision Making)   Leticia Valiente is a 66 year old female admitted to the ED Observation Unit with significantly worsening kidney function with a creatinine of 9.8.  Electrolytes are within normal limits.  She is making urine and her urine output is being monitored closely.  Currently awaiting bed availability at a facility that has nephrology available.     The patient is signed out to Dr. Cameron at change of shift.    Services consulted during the observation course: Nephrology. Notable consultant recommendations include:     Dr. Joshi advised urine protein creatinine ratio, urine sodium, monitor urine output with Rodriguez catheterization and renal ultrasound.     --  MD STEF Mcpherson [facility] EMERGENCY DEPT  1/8/2022       Kenn Rodriguez MD  01/08/22 0536    "

## 2022-01-08 NOTE — ED PROVIDER NOTES
Emergency Department Patient Sign-out       Brief HPI:  This is a 66 year old female signed out to me by Dr.S Hernandez at 9am .  This is a patient I took care of last night until shift end at 7 PM.  She had presented with hypotension in the context of dizziness and lightheadedness over 48-hour period.  She was recently treated for chronic diarrhea although stool studies were negative.  She had fluid responsive hypotension and found to have a creatinine of 9.8 with recent baseline of 1.68 a month prior.  Her creatinine is improving with IV fluids.  Nephrology was consulted and the plan was to transfer however with interval improvement in her renal function with creatinine now down to 6.78-trending downward from arrival creatinine 9.8 and electrolytes are remaining normal further disposition may be altered with close outpatient follow-up       Significant Events after my assuming care:  Reviewed work-up and labs from AM. Cr- 6.78, Cl-121, Ca2+ 7.5. I met with the patient and her daughter at 10:30 AM. We discussed plan of care and interval improvement in her renal function.   Patient reported loose stools x1 during her ED course to date. We discussed her volume loss from diarrhea. She had not tried any antidiarrheal agents. Plan of care was reviewed.    Repeat basic metabolic profile-some hypochloremia without acidosis.  Likely related to normal saline infusion.  Creatinine is 5.96 no recurrence of hypotension.  Given continued improvement in her creatinine I discussed with nephrology again if continued boarding the emergency department was prudent versus trial of outpatient care plan for recheck of her creatinine within 48 hours with outpatient follow-up.    I spoke with Dr. DEB Joshi at 1.03pm- Nephrology at the [facility] [place]. We discussed trial of outpatient care with close outpatient follow-up with recheck of metabolic profile in 48 hours.  Patient is followed by Dr. Kim Nascimento.  I reviewed this option with  the patient's she assures me that she is able to manage her fluid intake orally and I stressed the importance of staying hydrated and managing her diarrhea with antidiarrheal agents.  I ordered repeat basic metabolic profile for 1/10/2022 results to be followed with her PCP.  If she has any difficulty following up with her primary care provider through [facility] she may need to be evaluated emergency department    Patient elected to go home.  She was discharged home creatinine returned at 5.96.  I placed nephrology consult referral for further follow-up if her creatinine does not return to baseline.  She continues void spontaneously and ensured me that she would try to remain hydrated and manage her diarrhea at home.  I also sent an epic in basket message to her primary care provider to help with following up on her lab recheck. A lab recheck is planned for 1/10/22.  Patient was informed that if she had any difficulty securing a lab recheck appointment she should return to the department to be reevaluated    Exam:   Patient Vitals for the past 24 hrs:   BP Temp Temp src Pulse SpO2   01/08/22 1400 90/55 -- -- 65 100 %   01/08/22 1300 95/65 -- -- 65 99 %   01/08/22 1230 91/63 -- -- 67 100 %   01/08/22 1200 104/68 -- -- 65 100 %   01/08/22 1130 103/70 -- -- 75 100 %   01/08/22 1100 96/60 -- -- 67 100 %   01/08/22 1030 103/64 -- -- 67 100 %   01/08/22 1000 94/59 -- -- 62 100 %   01/08/22 0945 -- -- -- -- 100 %   01/08/22 0930 113/70 -- -- 69 100 %   01/08/22 0915 -- -- -- -- 100 %   01/08/22 0900 102/66 -- -- 75 100 %   01/08/22 0830 90/60 -- -- 72 100 %   01/08/22 0800 90/58 -- -- 68 100 %   01/08/22 0730 99/45 -- -- 72 100 %   01/08/22 0715 -- -- -- -- 100 %   01/08/22 0700 (!) 79/45 -- -- 62 99 %   01/08/22 0600 93/58 -- -- 63 96 %   01/08/22 0400 (!) 84/52 98.7  F (37.1  C) Oral 65 97 %   01/08/22 0100 (!) 58/49 -- -- 58 98 %   01/08/22 0000 (!) 87/54 98.8  F (37.1  C) Oral 60 100 %   01/07/22 2300 (!) 84/55 -- -- 65  100 %   01/07/22 2100 (!) 81/59 98.4  F (36.9  C) Oral 71 --   01/07/22 2030 90/64 -- -- 67 --   01/07/22 2000 90/59 -- -- 72 --   01/07/22 1930 106/66 -- -- 74 --   01/07/22 1900 98/61 -- -- 67 --   01/07/22 1730 99/62 -- -- 67 100 %   01/07/22 1700 100/65 -- -- 68 98 %   01/07/22 1637 91/63 -- -- 66 --   01/07/22 1500 (!) 80/53 -- -- 58 97 %   01/07/22 1430 (!) 77/43 -- -- 61 --       ED RESULTS:   Results for orders placed or performed during the hospital encounter of 01/07/22 (from the past 24 hour(s))   CT Head w/o Contrast     Status: None    Collection Time: 01/07/22  3:29 PM    Narrative    CT SCAN OF THE HEAD WITHOUT CONTRAST   1/7/2022 3:29 PM     HISTORY: Dizziness, non-specific. 48 hours of dizziness with near  fall. Hypotension. Evaluate for acute intracranial process. Possible  stroke.    TECHNIQUE:  Axial images of the head and coronal reformations without  IV contrast material. Radiation dose for this scan was reduced using  automated exposure control, adjustment of the mA and/or kV according  to patient size, or iterative reconstruction technique.    COMPARISON: Brain MRI dated 1/8/2009.    FINDINGS: The ventricles are normal in size and configuration. There  is mild generalized brain parenchymal volume loss. Mildly low-lying  cerebellar tonsils, as before, without other findings of Chiari I  malformation. Probable small dilated perivascular space along the  inferior posterior aspect of the right lentiform nucleus (series 2  image 10). Otherwise, the morphology and density of the brain  parenchyma appears within normal limits. No acute intracranial  hemorrhage, extra axial fluid collection, mass lesion or herniation.    The visualized portions of the sinuses and mastoids appear normal. The  bony calvarium and bones of the skull base appear intact.       Impression    IMPRESSION: No CT findings of acute intracranial process.    AMY TODD MD         SYSTEM ID:  [id number]   US Renal Complete      Status: None    Collection Time: 01/07/22  4:56 PM    Narrative    EXAM: US RENAL COMPLETE  LOCATION: [facility]  DATE/TIME: 1/7/2022 4:18 PM    INDICATION: Acute renal failure. History of stage-3b CKD.  COMPARISON: None.  TECHNIQUE: Routine Bilateral Renal and Bladder Ultrasound.    FINDINGS:    RIGHT KIDNEY: 8.3 x 4.9 x 3.9 cm. Small right kidney with increased  cortical echogenicity. No hydronephrosis, shadowing stone, or renal  mass.     LEFT KIDNEY: 8.5 x 4.3 x 5.1 cm. Small left kidney with increased  cortical echogenicity. No hydronephrosis, shadowing stone, or renal  mass.     BLADDER: Normal.      Impression    IMPRESSION:  1.  Increased renal echogenicity suggestive of medical renal disease.  No hydronephrosis.    CARLA SHOEMAKER MD         SYSTEM ID:  [id number]   Asymptomatic COVID-19 Virus (Coronavirus) by PCR Nasopharyngeal     Status: Normal    Collection Time: 01/07/22  5:12 PM    Specimen: Nasopharyngeal; Swab   Result Value Ref Range    SARS CoV2 PCR Negative Negative    Narrative    Testing was performed using the elly  SARS-CoV-2 & Influenza A/B Assay on the elly  Tova  System.  This test should be ordered for the detection of SARS-COV-2 in individuals who meet SARS-CoV-2 clinical and/or epidemiological criteria. Test performance is unknown in asymptomatic patients.  This test is for in vitro diagnostic use under the FDA EUA for laboratories certified under CLIA to perform moderate and/or high complexity testing. This test has not been FDA cleared or approved.  A negative test does not rule out the presence of PCR inhibitors in the specimen or target RNA in concentration below the limit of detection for the assay. The possibility of a false negative should be considered if the patient's recent exposure or clinical presentation suggests COVID-19.  [facility] [place] [facility] are certified under the Clinical Laboratory Improvement Amendments of 1988 (CLIA-88) as  qualified to perform moderate and/or high complexity laboratory testing.   UA with Microscopic reflex to Culture     Status: Abnormal    Collection Time: 01/07/22  5:33 PM    Specimen: Urine, Catheter   Result Value Ref Range    Color Urine Yellow Colorless, Straw, Light Yellow, Yellow    Appearance Urine Clear Clear    Glucose Urine Negative Negative mg/dL    Bilirubin Urine Negative Negative    Ketones Urine Negative Negative mg/dL    Specific Gravity Urine 1.010 1.003 - 1.035    Blood Urine Small (A) Negative    pH Urine 5.0 5.0 - 7.0    Protein Albumin Urine 30  (A) Negative mg/dL    Urobilinogen Urine Normal Normal, 2.0 mg/dL    Nitrite Urine Negative Negative    Leukocyte Esterase Urine Negative Negative    Bacteria Urine Few (A) None Seen /HPF    RBC Urine 1 <=2 /HPF    WBC Urine 5 <=5 /HPF    Squamous Epithelials Urine 1 <=1 /HPF    Narrative    Urine Culture not indicated   Sodium random urine     Status: None    Collection Time: 01/07/22  5:33 PM   Result Value Ref Range    Sodium Urine mmol/L 23 mmol/L   Basic metabolic panel     Status: Abnormal    Collection Time: 01/07/22  5:38 PM   Result Value Ref Range    Sodium 133 133 - 144 mmol/L    Potassium 3.5 3.4 - 5.3 mmol/L    Chloride 106 94 - 109 mmol/L    Carbon Dioxide (CO2) 13 (L) 20 - 32 mmol/L    Anion Gap 14 3 - 14 mmol/L    Urea Nitrogen 90 (H) 7 - 30 mg/dL    Creatinine 9.16 (H) 0.52 - 1.04 mg/dL    Calcium 7.9 (L) 8.5 - 10.1 mg/dL    Glucose 79 70 - 99 mg/dL    GFR Estimate 4 (L) >60 mL/min/1.73m2   Basic metabolic panel     Status: Abnormal    Collection Time: 01/07/22 11:09 PM   Result Value Ref Range    Sodium 134 133 - 144 mmol/L    Potassium 3.5 3.4 - 5.3 mmol/L    Chloride 110 (H) 94 - 109 mmol/L    Carbon Dioxide (CO2) 13 (L) 20 - 32 mmol/L    Anion Gap 11 3 - 14 mmol/L    Urea Nitrogen 88 (H) 7 - 30 mg/dL    Creatinine 8.38 (H) 0.52 - 1.04 mg/dL    Calcium 7.1 (L) 8.5 - 10.1 mg/dL    Glucose 91 70 - 99 mg/dL    GFR Estimate 5 (L) >60  mL/min/1.73m2   Basic metabolic panel     Status: Abnormal    Collection Time: 01/08/22  7:08 AM   Result Value Ref Range    Sodium 144 133 - 144 mmol/L    Potassium 3.8 3.4 - 5.3 mmol/L    Chloride 121 (H) 94 - 109 mmol/L    Carbon Dioxide (CO2) 11 (L) 20 - 32 mmol/L    Anion Gap 12 3 - 14 mmol/L    Urea Nitrogen 83 (H) 7 - 30 mg/dL    Creatinine 6.78 (H) 0.52 - 1.04 mg/dL    Calcium 7.5 (L) 8.5 - 10.1 mg/dL    Glucose 98 70 - 99 mg/dL    GFR Estimate 6 (L) >60 mL/min/1.73m2   Extra Tube     Status: None ()    Collection Time: 01/08/22  7:11 AM    Narrative    The following orders were created for panel order Extra Tube.  Procedure                               Abnormality         Status                     ---------                               -----------         ------                     Extra Purple Top Tube[[id number]]                                                         Please view results for these tests on the individual orders.   Basic metabolic panel     Status: Abnormal    Collection Time: 01/08/22 12:08 PM   Result Value Ref Range    Sodium 142 133 - 144 mmol/L    Potassium 3.6 3.4 - 5.3 mmol/L    Chloride 120 (H) 94 - 109 mmol/L    Carbon Dioxide (CO2) 12 (L) 20 - 32 mmol/L    Anion Gap 10 3 - 14 mmol/L    Urea Nitrogen 79 (H) 7 - 30 mg/dL    Creatinine 5.96 (H) 0.52 - 1.04 mg/dL    Calcium 7.7 (L) 8.5 - 10.1 mg/dL    Glucose 92 70 - 99 mg/dL    GFR Estimate 7 (L) >60 mL/min/1.73m2   Ionized Calcium     Status: Normal    Collection Time: 01/08/22 12:08 PM   Result Value Ref Range    Calcium Ionized 4.4 4.4 - 5.2 mg/dL   Protein timed urine with Creat Ratio     Status: Abnormal    Collection Time: 01/08/22  1:04 PM   Result Value Ref Range    Creatinine Urine mg/dL 37 mg/dL    Total Protein Random Urine g/L 0.19 g/L    Total Protein Timed Urine g/spec 0.95 (H) 0.04 - 0.23 g/spec    Total Protein Urine g/gr Creatinine 0.51 (H) 0.00 - 0.20 g/g Cr    Duration in hours 12.0 h    Volume in mL 2,500 mL     Creatinine Urine Timed g/spec 1.85 (H) 0.80 - 1.80 g/spec       ED MEDICATIONS:   Medications   ondansetron (ZOFRAN) injection 4 mg (has no administration in time range)   dimenhyDRINATE (DRAMAMINE) tablet 50 mg (has no administration in time range)   sodium chloride 0.9% infusion (0 mLs Intravenous Stopped 1/8/22 1419)   acetaminophen (TYLENOL) tablet 650 mg (650 mg Oral Given 1/8/22 0431)   HYDROmorphone (PF) (DILAUDID) injection 0.5 mg (has no administration in time range)   levothyroxine (SYNTHROID/LEVOTHROID) tablet 100 mcg (100 mcg Oral Given 1/8/22 0921)   atorvastatin (LIPITOR) tablet 40 mg (40 mg Oral Given 1/8/22 0921)   0.9% sodium chloride BOLUS (0 mLs Intravenous Stopped 1/7/22 1500)   0.9% sodium chloride BOLUS (0 mLs Intravenous Stopped 1/8/22 0044)       Impression:    ICD-10-CM    1. Acute renal failure, unspecified acute renal failure type (H)  N17.9 Basic metabolic panel     Adult Nephrology Referral   2. Stage 3b chronic kidney disease (H)  N18.32 Basic metabolic panel     Adult Nephrology Referral   3. Chronic diarrhea  K52.9        Plan:    Discharged to home with plan for lab recheck in 48 hours.  Follow-up with clinic provider.  Nephrology referral pending if creatinine does not return to baseline      MD Ann Clark Ebenezer Tope, MD  01/08/22 [id number]

## 2022-01-10 ENCOUNTER — TELEPHONE (OUTPATIENT)
Dept: FAMILY MEDICINE | Facility: CLINIC | Age: 67
End: 2022-01-10

## 2022-01-10 ENCOUNTER — LAB (OUTPATIENT)
Dept: LAB | Facility: CLINIC | Age: 67
End: 2022-01-10
Payer: COMMERCIAL

## 2022-01-10 ENCOUNTER — PATIENT OUTREACH (OUTPATIENT)
Dept: FAMILY MEDICINE | Facility: CLINIC | Age: 67
End: 2022-01-10

## 2022-01-10 DIAGNOSIS — N17.9 ACUTE KIDNEY INJURY (H): Primary | ICD-10-CM

## 2022-01-10 DIAGNOSIS — N17.9 ACUTE RENAL FAILURE, UNSPECIFIED ACUTE RENAL FAILURE TYPE (H): ICD-10-CM

## 2022-01-10 DIAGNOSIS — N18.32 STAGE 3B CHRONIC KIDNEY DISEASE (H): ICD-10-CM

## 2022-01-10 LAB
ANION GAP SERPL CALCULATED.3IONS-SCNC: 10 MMOL/L (ref 3–14)
BUN SERPL-MCNC: 61 MG/DL (ref 7–30)
CALCIUM SERPL-MCNC: 8.8 MG/DL (ref 8.5–10.1)
CHLORIDE BLD-SCNC: 115 MMOL/L (ref 94–109)
CO2 SERPL-SCNC: 14 MMOL/L (ref 20–32)
CREAT SERPL-MCNC: 2.89 MG/DL (ref 0.52–1.04)
GFR SERPL CREATININE-BSD FRML MDRD: 17 ML/MIN/1.73M2
GLUCOSE BLD-MCNC: 91 MG/DL (ref 70–99)
POTASSIUM BLD-SCNC: 3 MMOL/L (ref 3.4–5.3)
SODIUM SERPL-SCNC: 139 MMOL/L (ref 133–144)

## 2022-01-10 PROCEDURE — 80048 BASIC METABOLIC PNL TOTAL CA: CPT

## 2022-01-10 PROCEDURE — 36415 COLL VENOUS BLD VENIPUNCTURE: CPT

## 2022-01-11 ENCOUNTER — MYC MEDICAL ADVICE (OUTPATIENT)
Dept: FAMILY MEDICINE | Facility: CLINIC | Age: 67
End: 2022-01-11
Payer: COMMERCIAL

## 2022-01-11 NOTE — RESULT ENCOUNTER NOTE
Leticia--    Dr. Gomes in the ER sent me a note about your stay.  Your kidney function is improving.  My RN will call you on 1/11.

## 2022-01-11 NOTE — TELEPHONE ENCOUNTER
Dr. Mcneil,    Patient is reached.  She did follow up with her nephrologist yesterday.  We did schedule her with you on 1/28/22.  She has stopped her lisinopril.  Patient would like you to review her CT as everyone was so concerned about her renal failure that seems to have been pushed to the side. Coleen HERNANDEZ RN

## 2022-01-11 NOTE — TELEPHONE ENCOUNTER
Patient was seen in the ER 1/7, for acute kidney failure.  There were no hospital beds in the state, so shared decision was made to discharge home w/close follow-up.  Her kidney function is improving.  She needs urgent follow-up.  It looks like she reached out to her Nephrologist DR. Irvin who may have worked her in?  If not, she can have a same day appointment with me Tue or Wed, with lab 45 min prior.    If she has had follow-up with Nephrology, she should follow-up with me after her colonoscopy on 1/27 (so 1/28 or after) to discuss her diarrhea which likely led to her dehydration and acute kidney injury.    Lastly, if she is still taking lisinopril, she should stop it until I follow-up with her or her nephrologist.

## 2022-01-12 NOTE — TELEPHONE ENCOUNTER
Hospital/TCU/ED for chronic condition Discharge Protocol    Leticia has ER follow-up visit scheduled.    Antoinette Dean RN

## 2022-01-14 ENCOUNTER — LAB (OUTPATIENT)
Dept: LAB | Facility: CLINIC | Age: 67
End: 2022-01-14
Payer: COMMERCIAL

## 2022-01-14 DIAGNOSIS — E87.6 LOW BLOOD POTASSIUM: ICD-10-CM

## 2022-01-14 DIAGNOSIS — N17.9 ACUTE KIDNEY INJURY (H): ICD-10-CM

## 2022-01-14 DIAGNOSIS — E87.20 ACIDOSIS: ICD-10-CM

## 2022-01-14 DIAGNOSIS — N17.9 ACUTE KIDNEY FAILURE, UNSPECIFIED (H): Primary | ICD-10-CM

## 2022-01-14 LAB
ANION GAP SERPL CALCULATED.3IONS-SCNC: 10 MMOL/L (ref 3–14)
BUN SERPL-MCNC: 45 MG/DL (ref 7–30)
CALCIUM SERPL-MCNC: 9.1 MG/DL (ref 8.5–10.1)
CHLORIDE BLD-SCNC: 115 MMOL/L (ref 94–109)
CO2 SERPL-SCNC: 20 MMOL/L (ref 20–32)
CREAT SERPL-MCNC: 2.27 MG/DL (ref 0.52–1.04)
GFR SERPL CREATININE-BSD FRML MDRD: 23 ML/MIN/1.73M2
GLUCOSE BLD-MCNC: 97 MG/DL (ref 70–99)
POTASSIUM BLD-SCNC: 3.1 MMOL/L (ref 3.4–5.3)
SODIUM SERPL-SCNC: 145 MMOL/L (ref 133–144)

## 2022-01-14 PROCEDURE — 80048 BASIC METABOLIC PNL TOTAL CA: CPT

## 2022-01-14 PROCEDURE — 36415 COLL VENOUS BLD VENIPUNCTURE: CPT

## 2022-01-14 NOTE — RESULT ENCOUNTER NOTE
The kidney function has returned to near your previous baseline.  The potassium remains low but is improving.  The bicarb is improving as well.  The sodium is slightly high.    The episode of kidney injury appears to have recovered.  It looks like Dr. Castrejon wanted you to stop your lisinopril for the time being.  Follow-up with him to determine when it safe to restart

## 2022-01-17 DIAGNOSIS — Z11.59 ENCOUNTER FOR SCREENING FOR OTHER VIRAL DISEASES: Primary | ICD-10-CM

## 2022-01-21 RX ORDER — SODIUM BICARBONATE 650 MG/1
650 TABLET ORAL
COMMUNITY
Start: 2022-01-10 | End: 2023-07-28

## 2022-01-21 RX ORDER — POTASSIUM CHLORIDE 1500 MG/1
1 TABLET, EXTENDED RELEASE ORAL DAILY
COMMUNITY
Start: 2022-01-10 | End: 2023-01-10

## 2022-01-24 ENCOUNTER — LAB (OUTPATIENT)
Dept: LAB | Facility: CLINIC | Age: 67
End: 2022-01-24
Payer: COMMERCIAL

## 2022-01-24 DIAGNOSIS — Z11.59 ENCOUNTER FOR SCREENING FOR OTHER VIRAL DISEASES: ICD-10-CM

## 2022-01-24 PROCEDURE — U0005 INFEC AGEN DETEC AMPLI PROBE: HCPCS

## 2022-01-24 PROCEDURE — U0003 INFECTIOUS AGENT DETECTION BY NUCLEIC ACID (DNA OR RNA); SEVERE ACUTE RESPIRATORY SYNDROME CORONAVIRUS 2 (SARS-COV-2) (CORONAVIRUS DISEASE [COVID-19]), AMPLIFIED PROBE TECHNIQUE, MAKING USE OF HIGH THROUGHPUT TECHNOLOGIES AS DESCRIBED BY CMS-2020-01-R: HCPCS

## 2022-01-25 LAB — SARS-COV-2 RNA RESP QL NAA+PROBE: NEGATIVE

## 2022-01-26 ENCOUNTER — ANESTHESIA EVENT (OUTPATIENT)
Dept: GASTROENTEROLOGY | Facility: CLINIC | Age: 67
End: 2022-01-26
Payer: COMMERCIAL

## 2022-01-26 NOTE — ANESTHESIA PREPROCEDURE EVALUATION
Anesthesia Pre-Procedure Evaluation    Patient: Leticia Valiente   MRN: [id number] : 1955        Preoperative Diagnosis: Diarrhea, unspecified type [R19.7]    Procedure : Procedure(s):  COLONOSCOPY          Past Medical History:   Diagnosis Date     Arthritis      Chronic alcohol abuse      Chronic kidney disease     stage 4 kidney disease     CKD (chronic kidney disease) stage 4, GFR 15-29 ml/min (H) 2011     Convulsions (H)     ? convulsions     Depressive disorder, not elsewhere classified 08     Disease of thyroid gland      Graves disease     s/p radioactive iodine     Graves disease      Hypertension      Menopause      Other convulsions     normal EEG, not on meds     Unspecified disorder of kidney and ureter     Acute on chronic renal insufficiency     Unspecified essential hypertension      Unspecified hypothyroidism       Past Surgical History:   Procedure Laterality Date     BIOPSY VULVA Left 2019    Procedure: EXCISION OF LEFT GLUTEAL MASS;  Surgeon: Tadeo Childers DO;  Location: [facility];  Service: General     C THYROID ABLATION      radioactive ablation due to Graves     COLONOSCOPY  09/15/09     EYE SURGERY       HC REPAIR OF HAMMERTOE,ONE       HC REVISION OF UPPER EYELID      eyelid implants due to Graves disease     JOINT REPLACEMENT       KIDNEY STONE SURGERY       TONSILLECTOMY       TONSILLECTOMY       ZZC AFF EYE MUSCLE SURG PROC UNLISTED      superior muscle release, bilateral     ZZC REMOVAL OF KIDNEY STONE       Z TOTAL KNEE ARTHROPLASTY Right 2020    Procedure: TOTAL KNEE ARTHROPLASTY RIGHT;  Surgeon: Adithya Nascimento DO;  Location: [facility];  Service: Orthopedics     [facility] TOTAL KNEE ARTHROPLASTY Left 12/3/2020    Procedure: TOTAL KNEE ARTHROPLASTY LEFT;  Surgeon: Adithya Nascimento DO;  Location: [facility];  Service: Orthopedics      No Known Allergies   Social History     Tobacco Use     Smoking status: Current Some Day Smoker      Packs/day: 0.25     Years: 50.00     Pack years: 12.50     Types: Cigarettes     Last attempt to quit: 3/1/2020     Years since quittin.9     Smokeless tobacco: Current User     Tobacco comment: smokes 2 cigarettes per day, also using 5-6 mL nicotine in ecig   Substance Use Topics     Alcohol use: Yes     Comment: Recently quit, going through treatment      Wt Readings from Last 1 Encounters:   22 57.2 kg (126 lb)        Anesthesia Evaluation   Pt has had prior anesthetic. Type: General and MAC.    No history of anesthetic complications       ROS/MED HX  ENT/Pulmonary:     (+) tobacco use, Current use,     Neurologic:  - neg neurologic ROS     Cardiovascular:  - neg cardiovascular ROS   (+) hypertension-----    METS/Exercise Tolerance: >4 METS    Hematologic:  - neg hematologic  ROS     Musculoskeletal:  - neg musculoskeletal ROS     GI/Hepatic:  - neg GI/hepatic ROS     Renal/Genitourinary:     (+) renal disease, type: CRI,     Endo:     (+) thyroid problem,  Hx Graves, s/p ablation,     Psychiatric/Substance Use:  - neg psychiatric ROS     Infectious Disease:  - neg infectious disease ROS     Malignancy:  - neg malignancy ROS     Other:  - neg other ROS          Physical Exam    Airway        Mallampati: I   TM distance: > 3 FB   Neck ROM: full   Mouth opening: > 3 cm    Respiratory Devices and Support         Dental  no notable dental history         Cardiovascular          Rhythm and rate: regular and normal     Pulmonary   pulmonary exam normal        breath sounds clear to auscultation           OUTSIDE LABS:  CBC:   Lab Results   Component Value Date    WBC 8.8 2022    WBC 6.3 2021    HGB 15.6 2022    HGB 14.6 2021    HCT 47.3 (H) 2022    HCT 43.8 2021     2022     2021     BMP:   Lab Results   Component Value Date     (H) 2022     01/10/2022    POTASSIUM 3.1 (L) 2022    POTASSIUM 3.0 (L) 01/10/2022    CHLORIDE 115  (H) 01/14/2022    CHLORIDE 115 (H) 01/10/2022    CO2 20 01/14/2022    CO2 14 (L) 01/10/2022    BUN 45 (H) 01/14/2022    BUN 61 (H) 01/10/2022    CR 2.27 (H) 01/14/2022    CR 2.89 (H) 01/10/2022    GLC 97 01/14/2022    GLC 91 01/10/2022     COAGS:   Lab Results   Component Value Date    PTT 27 09/16/2008    INR 0.90 09/14/2020     POC: No results found for: BGM, HCG, HCGS  HEPATIC:   Lab Results   Component Value Date    ALBUMIN 3.7 12/21/2021    PROTTOTAL 7.6 12/21/2021    ALT 19 12/21/2021    AST 14 12/21/2021    ALKPHOS 75 12/21/2021    BILITOTAL 0.5 12/21/2021     OTHER:   Lab Results   Component Value Date    OZZY 9.1 01/14/2022    PHOS 3.3 11/14/2011    LIPASE 275 (H) 09/16/2008    AMYLASE 108 09/16/2008    TSH 0.01 (L) 12/21/2021    T4 1.35 12/21/2021       Anesthesia Plan    ASA Status:  3   NPO Status:  NPO Appropriate    Anesthesia Type: General.     - Airway: Native airway   Induction: Propofol, Intravenous.   Maintenance: TIVA.        Consents    Anesthesia Plan(s) and associated risks, benefits, and realistic alternatives discussed. Questions answered and patient/representative(s) expressed understanding.     - Discussed: Risks, Benefits and Alternatives for BOTH SEDATION and the PROCEDURE were discussed     - Discussed with:  Patient      - Extended Intubation/Ventilatory Support Discussed: No.      - Patient is DNR/DNI Status: No    Use of blood products discussed: No .     Postoperative Care       PONV prophylaxis: Ondansetron (or other 5HT-3)     Comments:                MARCELO Hamilton CRNA

## 2022-01-26 NOTE — PROGRESS NOTES
"  Assessment & Plan     Diarrhea, unspecified type -unclear cause.  Colonoscopy yesterday looked fairly normal, biopsies pending.  If there is no microscopic colitis or other cause of the diarrhea, then would need to see GI.  Given the ongoing diarrhea and her recent severe renal failure with ongoing recovery she needs a more urgent GI evaluation, in the next few weeks or so.  - Adult Gastro Ref - Consult Only; Future    Acute renal failure, unspecified acute renal failure type (H) -following with her nephrologist.  Off her metoprolol and ACE inhibitor due to acute renal failure  - Basic metabolic panel  (Ca, Cl, CO2, Creat, Gluc, K, Na, BUN); Future  - Basic metabolic panel  (Ca, Cl, CO2, Creat, Gluc, K, Na, BUN)  - ACE/ARB/ARNI NOT PRESCRIBED (INTENTIONAL); Please choose reason not prescribed from choices below.    Grave's disease -due for recheck on lab  - TSH with free T4 reflex; Future  - TSH with free T4 reflex    Postablative hypothyroidism  - TSH with free T4 reflex; Future  - TSH with free T4 reflex    Bilateral renal artery stenosis (H) -follows with nephrology               Tobacco Cessation:   reports that she has been smoking cigarettes. She has a 12.50 pack-year smoking history. She uses smokeless tobacco.      Patient Instructions   1. Send Dr. Irvin a My Chart message about kidney function today  2. If biopsies are negative, then next step is to see GI, referral placed; let Dr. Mcneil know if they are unable to see you in about 2 week time      Return in about 2 weeks (around 2/11/2022) for If symptoms don't improve or if they worsen.    Kim Mcneil, [facility]    Norma Laughlin is a 66 year old who presents for the following health issues     HPI     Dr. Irvin called pt yesterday and wanted her to check kidney function, no orders on file.   \"Please let her know I reviewed her labs in her kidney function was improved to a creatinine of 2.27.  Her potassium is " "low but she should be taking potassium once a day. Bicarbonate level was stable, continue sodium bicarbonate tablets.  Please have her do another blood test for a BMP early next week\"    ED/UC Followup:    Facility:  [facility]  Date of visit: 01/07/2022  Reason for visit: Acute renal failure, unspecified acute renal failure type (H)  Stage 3b chronic kidney disease (H)  Chronic diarrhea  Current Status: Pt is still having diarrhea, not as bad anymore. Stopped taking the lisinopril and metoprolol      --she thinks the potassium pills helped the diarrhea?  --levothyroxine was decreased from 112 to 100 on 12/22  --dizziness resolved    Diarrhea:  --I saw her 12/21 for diarrhea, stool studies, CT, celiac were all nromal  --she had c-scope yesterday, nothing abnormal seen, biopsy pending  --in Dec, was having ~ 8 bowel movement/day, with urgency, stools after eating  --the nighttime stools have resolved;  Stools are now 2ish hours after eating instead of immediately after eating; now having 4 bm/day.  Loose, not liquid stools  --no blood, normal appetite, no N/V, constipation, abdominal pain (mild cramping)  -using imodium every day which is helping.    Current Outpatient Medications   Medication Sig Dispense Refill     amLODIPine (NORVASC) 5 MG tablet Take 1 tablet by mouth daily. 90 tablet 3     atorvastatin (LIPITOR) 40 MG tablet Take 1 tablet (40 mg) by mouth daily 90 tablet 3     levothyroxine (SYNTHROID/LEVOTHROID) 100 MCG tablet Take 1 tablet (100 mcg) by mouth daily 90 tablet 3     loperamide (IMODIUM) 2 MG capsule Take 2 mg by mouth 3 times daily as needed for diarrhea       potassium chloride ER (KLOR-CON M) 20 MEQ CR tablet Take 1 tablet by mouth daily       sodium bicarbonate 650 MG tablet Take 650 mg by mouth           Review of Systems   Constitutional, HEENT, cardiovascular, pulmonary, gi and gu systems are negative, except as otherwise noted.      Objective    /60   " "Pulse 86   Temp 97.1  F (36.2  C) (Tympanic)   Resp 16   Ht 1.613 m (5' 3.5\")   Wt 58.5 kg (129 lb)   LMP  (LMP Unknown)   SpO2 99%   BMI 22.49 kg/m    Body mass index is 22.49 kg/m .  Physical Exam   GENERAL APPEARANCE: healthy, alert and no distress  RESP: lungs clear to auscultation - no rales, rhonchi or wheezes  CV: regular rates and rhythm, normal S1 S2, no S3 or S4 and no murmur, click or rub  ABDOMEN: soft, mild diffuse tenderness, without hepatosplenomegaly or masses and bowel sounds normal                "

## 2022-01-27 ENCOUNTER — HOSPITAL ENCOUNTER (OUTPATIENT)
Facility: CLINIC | Age: 67
Discharge: HOME | End: 2022-01-27
Attending: SURGERY | Admitting: SURGERY
Payer: COMMERCIAL

## 2022-01-27 ENCOUNTER — ANESTHESIA (OUTPATIENT)
Dept: GASTROENTEROLOGY | Facility: CLINIC | Age: 67
End: 2022-01-27
Payer: COMMERCIAL

## 2022-01-27 VITALS
SYSTOLIC BLOOD PRESSURE: 102 MMHG | OXYGEN SATURATION: 100 % | TEMPERATURE: 97.9 F | BODY MASS INDEX: 21.51 KG/M2 | RESPIRATION RATE: 18 BRPM | DIASTOLIC BLOOD PRESSURE: 68 MMHG | HEART RATE: 67 BPM | WEIGHT: 126 LBS | HEIGHT: 64 IN

## 2022-01-27 LAB — COLONOSCOPY: NORMAL

## 2022-01-27 PROCEDURE — 250N000011 HC RX IP 250 OP 636: Performed by: NURSE ANESTHETIST, CERTIFIED REGISTERED

## 2022-01-27 PROCEDURE — 258N000003 HC RX IP 258 OP 636: Performed by: SURGERY

## 2022-01-27 PROCEDURE — 88305 TISSUE EXAM BY PATHOLOGIST: CPT | Mod: TC | Performed by: SURGERY

## 2022-01-27 PROCEDURE — 45385 COLONOSCOPY W/LESION REMOVAL: CPT

## 2022-01-27 PROCEDURE — 45380 COLONOSCOPY AND BIOPSY: CPT | Mod: 59 | Performed by: SURGERY

## 2022-01-27 PROCEDURE — 45380 COLONOSCOPY AND BIOPSY: CPT | Mod: XU | Performed by: SURGERY

## 2022-01-27 PROCEDURE — 250N000009 HC RX 250: Performed by: SURGERY

## 2022-01-27 PROCEDURE — 370N000017 HC ANESTHESIA TECHNICAL FEE, PER MIN: Performed by: SURGERY

## 2022-01-27 PROCEDURE — 45385 COLONOSCOPY W/LESION REMOVAL: CPT | Mod: PT | Performed by: SURGERY

## 2022-01-27 PROCEDURE — 250N000009 HC RX 250: Performed by: NURSE ANESTHETIST, CERTIFIED REGISTERED

## 2022-01-27 RX ORDER — LIDOCAINE HYDROCHLORIDE 10 MG/ML
INJECTION, SOLUTION EPIDURAL; INFILTRATION; INTRACAUDAL; PERINEURAL PRN
Status: DISCONTINUED | OUTPATIENT
Start: 2022-01-27 | End: 2022-01-27

## 2022-01-27 RX ORDER — ONDANSETRON 2 MG/ML
4 INJECTION INTRAMUSCULAR; INTRAVENOUS
Status: DISCONTINUED | OUTPATIENT
Start: 2022-01-27 | End: 2022-01-27 | Stop reason: HOSPADM

## 2022-01-27 RX ORDER — PROPOFOL 10 MG/ML
INJECTION, EMULSION INTRAVENOUS CONTINUOUS PRN
Status: DISCONTINUED | OUTPATIENT
Start: 2022-01-27 | End: 2022-01-27

## 2022-01-27 RX ORDER — LIDOCAINE 40 MG/G
CREAM TOPICAL
Status: DISCONTINUED | OUTPATIENT
Start: 2022-01-27 | End: 2022-01-27 | Stop reason: HOSPADM

## 2022-01-27 RX ORDER — PROPOFOL 10 MG/ML
INJECTION, EMULSION INTRAVENOUS PRN
Status: DISCONTINUED | OUTPATIENT
Start: 2022-01-27 | End: 2022-01-27

## 2022-01-27 RX ORDER — SODIUM CHLORIDE, SODIUM LACTATE, POTASSIUM CHLORIDE, CALCIUM CHLORIDE 600; 310; 30; 20 MG/100ML; MG/100ML; MG/100ML; MG/100ML
INJECTION, SOLUTION INTRAVENOUS CONTINUOUS
Status: DISCONTINUED | OUTPATIENT
Start: 2022-01-27 | End: 2022-01-27 | Stop reason: HOSPADM

## 2022-01-27 RX ADMIN — PROPOFOL 100 MG: 10 INJECTION, EMULSION INTRAVENOUS at 09:34

## 2022-01-27 RX ADMIN — LIDOCAINE HYDROCHLORIDE 0.1 ML: 10 INJECTION, SOLUTION EPIDURAL; INFILTRATION; INTRACAUDAL; PERINEURAL at 09:14

## 2022-01-27 RX ADMIN — PROPOFOL 200 MCG/KG/MIN: 10 INJECTION, EMULSION INTRAVENOUS at 09:34

## 2022-01-27 RX ADMIN — LIDOCAINE HYDROCHLORIDE 50 MG: 10 INJECTION, SOLUTION EPIDURAL; INFILTRATION; INTRACAUDAL; PERINEURAL at 09:34

## 2022-01-27 RX ADMIN — SODIUM CHLORIDE, POTASSIUM CHLORIDE, SODIUM LACTATE AND CALCIUM CHLORIDE: 600; 310; 30; 20 INJECTION, SOLUTION INTRAVENOUS at 09:13

## 2022-01-27 ASSESSMENT — MIFFLIN-ST. JEOR: SCORE: 1101.53

## 2022-01-27 ASSESSMENT — LIFESTYLE VARIABLES: TOBACCO_USE: 1

## 2022-01-27 NOTE — H&P
66 year old year old female here for colonoscopy for changes in bowel habits.    Patient Active Problem List   Diagnosis     Grave's disease     Essential hypertension     Menopause     Vitamin D deficiency     Bilateral renal artery stenosis (H)     Stage 3b chronic kidney disease (H)     Spell of altered cognition     Postablative hypothyroidism     Atherosclerosis of renal artery (H)     Disorder of bone and cartilage     ETOH abuse     Iatrogenic hypotension     Tobacco dependence     S/P total knee arthroplasty, right     Chronic diarrhea     Acute renal failure, unspecified acute renal failure type (H)       Past Medical History:   Diagnosis Date     Arthritis      Chronic alcohol abuse      Chronic kidney disease     stage 4 kidney disease     CKD (chronic kidney disease) stage 4, GFR 15-29 ml/min (H) 1/25/2011     Convulsions (H)     ? convulsions     Depressive disorder, not elsewhere classified 11/27/08     Disease of thyroid gland      Graves disease     s/p radioactive iodine     Graves disease      Hypertension      Menopause      Other convulsions     normal EEG, not on meds     Unspecified disorder of kidney and ureter     Acute on chronic renal insufficiency     Unspecified essential hypertension      Unspecified hypothyroidism        Past Surgical History:   Procedure Laterality Date     BIOPSY VULVA Left 6/25/2019    Procedure: EXCISION OF LEFT GLUTEAL MASS;  Surgeon: Tadeo Childers DO;  Location: [facility];  Service: General     C THYROID ABLATION      radioactive ablation due to Graves     COLONOSCOPY  09/15/09     EYE SURGERY       HC REPAIR OF HAMMERTOE,ONE       HC REVISION OF UPPER EYELID      eyelid implants due to Graves disease     JOINT REPLACEMENT       KIDNEY STONE SURGERY       TONSILLECTOMY       TONSILLECTOMY       ZZC AFF EYE MUSCLE SURG PROC UNLISTED      superior muscle release, bilateral     ZZC REMOVAL OF KIDNEY STONE       ZZC TOTAL KNEE ARTHROPLASTY Right 9/14/2020  "   Procedure: TOTAL KNEE ARTHROPLASTY RIGHT;  Surgeon: Adithya Nascimento DO;  Location: [facility];  Service: Orthopedics     [facility] TOTAL KNEE ARTHROPLASTY Left 12/3/2020    Procedure: TOTAL KNEE ARTHROPLASTY LEFT;  Surgeon: Adithya Nascimento DO;  Location: [facility];  Service: Orthopedics       @[facility]@    No current outpatient medications on file.       No Known Allergies    Pt reports that she has been smoking cigarettes. She has a 12.50 pack-year smoking history. She uses smokeless tobacco. She reports current alcohol use. She reports that she does not use drugs.    Exam:  /67 (BP Location: Left arm)   Pulse 64   Temp 98.8  F (37.1  C) (Oral)   Resp 16   Ht 1.626 m (5' 4\")   Wt 57.2 kg (126 lb)   LMP  (LMP Unknown)   SpO2 100%   BMI 21.63 kg/m      Awake, Alert OX3  Lungs - CTA bilaterally  CV - RRR, no murmurs, distal pulses intact  Abd - soft, non-distended, non-tender, +BS  Extr - No cyanosis or edema    A/P 66 year old year old female in need of colonoscopy for changes in bowel habits.. Risks, benefits, alternatives, and complications were discussed including the possibility of perforation and the patient agreed to proceed.    Merlin Dockery MD     "

## 2022-01-27 NOTE — OP NOTE
Colonoscopy - a few polyps in ascending colon removed.  Random biopsies taken for microscopic colitis.

## 2022-01-27 NOTE — ANESTHESIA CARE TRANSFER NOTE
Patient: Leticia Valiente    Procedure: Procedure(s):  COLONOSCOPY, WITH POLYPECTOMY AND BIOPSY       Diagnosis: Diarrhea, unspecified type [R19.7]  Diagnosis Additional Information: No value filed.    Anesthesia Type:   General     Note:    Oropharynx: oropharynx clear of all foreign objects and spontaneously breathing  Level of Consciousness: drowsy  Oxygen Supplementation: nasal cannula  Level of Supplemental Oxygen (L/min / FiO2): 2  Independent Airway: airway patency satisfactory and stable  Dentition: dentition unchanged  Vital Signs Stable: post-procedure vital signs reviewed and stable  Report to RN Given: handoff report given  Patient transferred to: Phase II    Handoff Report: Identifed the Patient, Identified the Reponsible Provider, Reviewed the pertinent medical history, Discussed the surgical course, Reviewed Intra-OP anesthesia mangement and issues during anesthesia, Set expectations for post-procedure period and Allowed opportunity for questions and acknowledgement of understanding      Vitals:  Vitals Value Taken Time   BP 85/52 01/27/22 1010   Temp 36.6  C (97.9  F) 01/27/22 1002   Pulse 67 01/27/22 1010   Resp 16 01/27/22 1010   SpO2 100 % 01/27/22 1018   Vitals shown include unvalidated device data.    Electronically Signed By: Kei Mccarthy CRNA, APRN CRNA  January 27, 2022  10:19 AM

## 2022-01-27 NOTE — ANESTHESIA POSTPROCEDURE EVALUATION
Patient: Leticia Valiente    Procedure: Procedure(s):  COLONOSCOPY, WITH POLYPECTOMY AND BIOPSY       Diagnosis:Diarrhea, unspecified type [R19.7]  Diagnosis Additional Information: No value filed.    Anesthesia Type:  General    Note:  Disposition: Outpatient   Postop Pain Control: Uneventful            Sign Out: Well controlled pain   PONV: No   Neuro/Psych: Uneventful            Sign Out: Acceptable/Baseline neuro status   Airway/Respiratory: Uneventful            Sign Out: Acceptable/Baseline resp. status   CV/Hemodynamics: Uneventful            Sign Out: Acceptable CV status; No obvious hypovolemia; No obvious fluid overload   Other NRE: NONE   DID A NON-ROUTINE EVENT OCCUR? No           Last vitals:  Vitals Value Taken Time   /68 01/27/22 1024   Temp 36.6  C (97.9  F) 01/27/22 1002   Pulse 59 01/27/22 1024   Resp 16 01/27/22 1010   SpO2 100 % 01/27/22 1027   Vitals shown include unvalidated device data.    Electronically Signed By: Kei Mccarthy CRNA, APRN CRNA  January 27, 2022  10:28 AM

## 2022-01-28 ENCOUNTER — OFFICE VISIT (OUTPATIENT)
Dept: FAMILY MEDICINE | Facility: CLINIC | Age: 67
End: 2022-01-28
Payer: COMMERCIAL

## 2022-01-28 VITALS
DIASTOLIC BLOOD PRESSURE: 60 MMHG | RESPIRATION RATE: 16 BRPM | HEART RATE: 86 BPM | OXYGEN SATURATION: 99 % | TEMPERATURE: 97.1 F | HEIGHT: 64 IN | BODY MASS INDEX: 22.02 KG/M2 | WEIGHT: 129 LBS | SYSTOLIC BLOOD PRESSURE: 100 MMHG

## 2022-01-28 DIAGNOSIS — E05.00 GRAVES' DISEASE: ICD-10-CM

## 2022-01-28 DIAGNOSIS — I70.1 BILATERAL RENAL ARTERY STENOSIS (H): ICD-10-CM

## 2022-01-28 DIAGNOSIS — R19.7 DIARRHEA, UNSPECIFIED TYPE: Primary | ICD-10-CM

## 2022-01-28 DIAGNOSIS — N17.9 ACUTE RENAL FAILURE, UNSPECIFIED ACUTE RENAL FAILURE TYPE (H): ICD-10-CM

## 2022-01-28 DIAGNOSIS — E89.0 POSTABLATIVE HYPOTHYROIDISM: ICD-10-CM

## 2022-01-28 LAB
ANION GAP SERPL CALCULATED.3IONS-SCNC: 8 MMOL/L (ref 3–14)
BUN SERPL-MCNC: 30 MG/DL (ref 7–30)
CALCIUM SERPL-MCNC: 9.5 MG/DL (ref 8.5–10.1)
CHLORIDE BLD-SCNC: 107 MMOL/L (ref 94–109)
CO2 SERPL-SCNC: 23 MMOL/L (ref 20–32)
CREAT SERPL-MCNC: 2.28 MG/DL (ref 0.52–1.04)
GFR SERPL CREATININE-BSD FRML MDRD: 23 ML/MIN/1.73M2
GLUCOSE BLD-MCNC: 95 MG/DL (ref 70–99)
PATH REPORT.COMMENTS IMP SPEC: NORMAL
PATH REPORT.COMMENTS IMP SPEC: NORMAL
PATH REPORT.FINAL DX SPEC: NORMAL
PATH REPORT.GROSS SPEC: NORMAL
PATH REPORT.MICROSCOPIC SPEC OTHER STN: NORMAL
PATH REPORT.RELEVANT HX SPEC: NORMAL
PHOTO IMAGE: NORMAL
POTASSIUM BLD-SCNC: 4 MMOL/L (ref 3.4–5.3)
SODIUM SERPL-SCNC: 138 MMOL/L (ref 133–144)
T4 FREE SERPL-MCNC: 1.38 NG/DL (ref 0.76–1.46)
TSH SERPL DL<=0.005 MIU/L-ACNC: 0.05 MU/L (ref 0.4–4)

## 2022-01-28 PROCEDURE — 84439 ASSAY OF FREE THYROXINE: CPT | Performed by: INTERNAL MEDICINE

## 2022-01-28 PROCEDURE — 88305 TISSUE EXAM BY PATHOLOGIST: CPT | Mod: 26 | Performed by: PATHOLOGY

## 2022-01-28 PROCEDURE — 36415 COLL VENOUS BLD VENIPUNCTURE: CPT | Performed by: INTERNAL MEDICINE

## 2022-01-28 PROCEDURE — 84443 ASSAY THYROID STIM HORMONE: CPT | Performed by: INTERNAL MEDICINE

## 2022-01-28 PROCEDURE — 99214 OFFICE O/P EST MOD 30 MIN: CPT | Performed by: INTERNAL MEDICINE

## 2022-01-28 PROCEDURE — 80048 BASIC METABOLIC PNL TOTAL CA: CPT | Performed by: INTERNAL MEDICINE

## 2022-01-28 RX ORDER — LOPERAMIDE HCL 2 MG
2 CAPSULE ORAL 3 TIMES DAILY PRN
COMMUNITY
End: 2023-07-28

## 2022-01-28 RX ORDER — LEVOTHYROXINE SODIUM 88 UG/1
88 TABLET ORAL DAILY
Qty: 30 TABLET | Refills: 2 | Status: SHIPPED | OUTPATIENT
Start: 2022-01-28 | End: 2022-05-06

## 2022-01-28 ASSESSMENT — MIFFLIN-ST. JEOR: SCORE: 1107.2

## 2022-01-28 NOTE — RESULT ENCOUNTER NOTE
The kidney function is stable compared to 2 weeks ago.  The electrolytes are normal.  The TSH remains low although improved from 1 month ago.  Overactive thyroid can cause diarrhea.  Recommend to decrease the levothyroxine from 100 to 88 mcg.  We should recheck the thyroid in 1-2 months

## 2022-01-28 NOTE — PATIENT INSTRUCTIONS
1. Send Dr. Irvin a My Chart message about kidney function today  2. If biopsies are negative, then next step is to see GI, referral placed; let Dr. Mcneil know if they are unable to see you in about 2 week time

## 2022-01-31 DIAGNOSIS — N17.9 ACUTE KIDNEY FAILURE, UNSPECIFIED (H): Primary | ICD-10-CM

## 2022-02-14 ENCOUNTER — OFFICE VISIT (OUTPATIENT)
Dept: GASTROENTEROLOGY | Facility: CLINIC | Age: 67
End: 2022-02-14
Attending: INTERNAL MEDICINE
Payer: COMMERCIAL

## 2022-02-14 VITALS
DIASTOLIC BLOOD PRESSURE: 80 MMHG | BODY MASS INDEX: 22.02 KG/M2 | HEIGHT: 64 IN | WEIGHT: 129 LBS | SYSTOLIC BLOOD PRESSURE: 126 MMHG

## 2022-02-14 DIAGNOSIS — K58.0 IRRITABLE BOWEL SYNDROME WITH DIARRHEA: Primary | ICD-10-CM

## 2022-02-14 DIAGNOSIS — R19.7 DIARRHEA, UNSPECIFIED TYPE: ICD-10-CM

## 2022-02-14 PROCEDURE — 99203 OFFICE O/P NEW LOW 30 MIN: CPT | Performed by: INTERNAL MEDICINE

## 2022-02-14 ASSESSMENT — MIFFLIN-ST. JEOR: SCORE: 1107.2

## 2022-02-14 NOTE — PATIENT INSTRUCTIONS
As discussed    1.  Fiber is fine if you like however this does not really speed of or slow things down it just merely adds bulk    2 Imodium 1 to 2 tablets a day may provide sufficient control if too constipating half tablet or 1 every other day may be beneficial.  It is unlikely that you would need 4 tablets or 8 tablets a day is maximum dose.    3 avoid sugar-free products with xylitol and avoid honey which may aggravate looser stools    4 low FODMAPs diet may be beneficial there is information from [facility] or [facility] online however I found my [email or web address] from [place] to be helpful as well.    There are other considerations if needed in the future.  These would be prescription medications if needed.    As needed return

## 2022-02-14 NOTE — PROGRESS NOTES
GI consult     66-year-old female accompanied by family to review diarrhea.  For many years patient has noted a more profound gastrocolic reflex however this has become worse over the last 6 months this is associated with looser stools.  She was found to have diarrhea and is been on supplemental potassium and this is somewhat beneficial no new meds.  Which she takes Imodium as needed 1 or 2 this is been beneficial.  Rarely she will have more severe intestinal cramps that require her to lie down for relief.  Weight is stable there are no alarm features    No sorbitol or high fructose corn syrup sodas however she will use honey from time to time she does not ingest milk or  Agave.  Reviewed that alcohol to can produce looser stools    Colonoscopy was performed last month on 27 January 2022 few polyps were removed with a snare diverticulosis was found random biopsies were negative for microscopic colitis.  Previous testing in December TTG was 0.3 T4 was 1.35.  Normal labs.    Past medical history reviewed on epic    Occasions reviewed on epic    Allergies reviewed on epic    Family history noncontributory.    Habits reviewed.    On exam no acute distress blood pressure 126/8 0, pulse 8 6, BMI 22.49    Head and neck unremarkable, cardiac S1-S2 without murmur, lungs clear throughout, abdomen soft nontender without organomegaly, no lower extremity edema skin without rash    Impression intermittent looser stools with a more profound gastrocolic reflex.  Armenta criteria present.  Overall, IBS-D.    Plan: Supplemental fibers okay this is fine and is a classic approach to help soak up more fluids often this helps modify stool form.    2.  Scheduled Imodium 1 to 2 tablets a day it is unlikely she would require 4 max doses 8 (16 mg).  This may help on a regular basis with intestinal slowing.    3.  Avoid xylitol from sugar-free candy and gum avoid honey which is a double sugar molecule.    4 Questran Lotronex tricyclic all  remain options.  Pancreatic testing with fecal elastase may not be needed at this point.    5 review FODMAPs with attention to fruits and sweeteners [facility], [place], or [facility] with [email or web address]    Discussed questions answered.  As needed return

## 2022-02-14 NOTE — LETTER
2/14/2022         RE: Leticia Valiente  [id number]  [place] [facility] [address]        Dear Colleague,    Thank you for referring your patient, Leticia Valiente, to the [facility]. Please see a copy of my visit note below.    GI consult     66-year-old female accompanied by family to review diarrhea.  For many years patient has noted a more profound gastrocolic reflex however this has become worse over the last 6 months this is associated with looser stools.  She was found to have diarrhea and is been on supplemental potassium and this is somewhat beneficial no new meds.  Which she takes Imodium as needed 1 or 2 this is been beneficial.  Rarely she will have more severe intestinal cramps that require her to lie down for relief.  Weight is stable there are no alarm features    No sorbitol or high fructose corn syrup sodas however she will use honey from time to time she does not ingest milk or  Agave.  Reviewed that alcohol to can produce looser stools    Colonoscopy was performed last month on 27 January 2022 few polyps were removed with a snare diverticulosis was found random biopsies were negative for microscopic colitis.  Previous testing in December TTG was 0.3 T4 was 1.35.  Normal labs.    Past medical history reviewed on epic    Occasions reviewed on epic    Allergies reviewed on epic    Family history noncontributory.    Habits reviewed.    On exam no acute distress blood pressure 126/8 0, pulse 8 6, BMI 22.49    Head and neck unremarkable, cardiac S1-S2 without murmur, lungs clear throughout, abdomen soft nontender without organomegaly, no lower extremity edema skin without rash    Impression intermittent looser stools with a more profound gastrocolic reflex.  Armenta criteria present.  Overall, IBS-D.    Plan: Supplemental fibers okay this is fine and is a classic approach to help soak up more fluids often this helps modify stool form.    2.  Scheduled Imodium 1 to 2 tablets a  day it is unlikely she would require 4 max doses 8 (16 mg).  This may help on a regular basis with intestinal slowing.    3.  Avoid xylitol from sugar-free candy and gum avoid honey which is a double sugar molecule.    4 Questran Lotronex tricyclic all remain options.  Pancreatic testing with fecal elastase may not be needed at this point.    5 review FODMAPs with attention to fruits and sweeteners [facility] [place], or [facility] with [email or web address]    Discussed questions answered.  As needed return                Again, thank you for allowing me to participate in the care of your patient.        Sincerely,        Kevin Guerin MD

## 2022-02-23 ENCOUNTER — DOCUMENTATION ONLY (OUTPATIENT)
Dept: OTHER | Facility: CLINIC | Age: 67
End: 2022-02-23
Payer: COMMERCIAL

## 2022-05-04 DIAGNOSIS — N18.32 CHRONIC KIDNEY DISEASE (CKD) STAGE G3B/A2, MODERATELY DECREASED GLOMERULAR FILTRATION RATE (GFR) BETWEEN 30-44 ML/MIN/1.73 SQUARE METER AND ALBUMINURIA CREATININE RATIO BETWEEN 30-299 MG/G (H): Primary | ICD-10-CM

## 2022-05-06 ENCOUNTER — OFFICE VISIT (OUTPATIENT)
Dept: FAMILY MEDICINE | Facility: CLINIC | Age: 67
End: 2022-05-06
Payer: COMMERCIAL

## 2022-05-06 VITALS
HEIGHT: 64 IN | RESPIRATION RATE: 16 BRPM | OXYGEN SATURATION: 99 % | WEIGHT: 128.3 LBS | TEMPERATURE: 98.7 F | BODY MASS INDEX: 21.91 KG/M2 | HEART RATE: 71 BPM | DIASTOLIC BLOOD PRESSURE: 70 MMHG | SYSTOLIC BLOOD PRESSURE: 110 MMHG

## 2022-05-06 DIAGNOSIS — I10 ESSENTIAL HYPERTENSION: ICD-10-CM

## 2022-05-06 DIAGNOSIS — E89.0 POSTABLATIVE HYPOTHYROIDISM: Primary | ICD-10-CM

## 2022-05-06 DIAGNOSIS — Z12.31 VISIT FOR SCREENING MAMMOGRAM: ICD-10-CM

## 2022-05-06 DIAGNOSIS — N18.32 CHRONIC KIDNEY DISEASE (CKD) STAGE G3B/A2, MODERATELY DECREASED GLOMERULAR FILTRATION RATE (GFR) BETWEEN 30-44 ML/MIN/1.73 SQUARE METER AND ALBUMINURIA CREATININE RATIO BETWEEN 30-299 MG/G (H): ICD-10-CM

## 2022-05-06 DIAGNOSIS — K58.0 IRRITABLE BOWEL SYNDROME WITH DIARRHEA: ICD-10-CM

## 2022-05-06 LAB
ANION GAP SERPL CALCULATED.3IONS-SCNC: 9 MMOL/L (ref 3–14)
BUN SERPL-MCNC: 27 MG/DL (ref 7–30)
CALCIUM SERPL-MCNC: 9.7 MG/DL (ref 8.5–10.1)
CHLORIDE BLD-SCNC: 109 MMOL/L (ref 94–109)
CO2 SERPL-SCNC: 23 MMOL/L (ref 20–32)
CREAT SERPL-MCNC: 2.11 MG/DL (ref 0.52–1.04)
CREAT UR-MCNC: 286 MG/DL
GFR SERPL CREATININE-BSD FRML MDRD: 25 ML/MIN/1.73M2
GLUCOSE BLD-MCNC: 98 MG/DL (ref 70–99)
HGB BLD-MCNC: 13.9 G/DL (ref 11.7–15.7)
MICROALBUMIN UR-MCNC: 264 MG/L
MICROALBUMIN/CREAT UR: 92.31 MG/G CR (ref 0–25)
POTASSIUM BLD-SCNC: 3.2 MMOL/L (ref 3.4–5.3)
SODIUM SERPL-SCNC: 141 MMOL/L (ref 133–144)
T4 FREE SERPL-MCNC: 1.59 NG/DL (ref 0.76–1.46)
TSH SERPL DL<=0.005 MIU/L-ACNC: <0.01 MU/L (ref 0.4–4)

## 2022-05-06 PROCEDURE — 85018 HEMOGLOBIN: CPT | Performed by: INTERNAL MEDICINE

## 2022-05-06 PROCEDURE — 84443 ASSAY THYROID STIM HORMONE: CPT | Performed by: INTERNAL MEDICINE

## 2022-05-06 PROCEDURE — 80048 BASIC METABOLIC PNL TOTAL CA: CPT | Performed by: INTERNAL MEDICINE

## 2022-05-06 PROCEDURE — 36415 COLL VENOUS BLD VENIPUNCTURE: CPT | Performed by: INTERNAL MEDICINE

## 2022-05-06 PROCEDURE — 91305 COVID-19,PF,PFIZER (12+ YRS): CPT | Performed by: INTERNAL MEDICINE

## 2022-05-06 PROCEDURE — 0054A COVID-19,PF,PFIZER (12+ YRS): CPT | Performed by: INTERNAL MEDICINE

## 2022-05-06 PROCEDURE — 82043 UR ALBUMIN QUANTITATIVE: CPT | Performed by: INTERNAL MEDICINE

## 2022-05-06 PROCEDURE — 84439 ASSAY OF FREE THYROXINE: CPT | Performed by: INTERNAL MEDICINE

## 2022-05-06 PROCEDURE — 99214 OFFICE O/P EST MOD 30 MIN: CPT | Mod: 25 | Performed by: INTERNAL MEDICINE

## 2022-05-06 RX ORDER — LEVOTHYROXINE SODIUM 75 UG/1
75 TABLET ORAL DAILY
Qty: 90 TABLET | Refills: 3 | Status: SHIPPED | OUTPATIENT
Start: 2022-05-06 | End: 2023-05-01

## 2022-05-06 RX ORDER — LOPERAMIDE HYDROCHLORIDE 2 MG/1
2-4 TABLET ORAL 4 TIMES DAILY PRN
Qty: 100 TABLET | Refills: 11 | Status: SHIPPED | OUTPATIENT
Start: 2022-05-06 | End: 2023-07-28

## 2022-05-06 ASSESSMENT — PAIN SCALES - GENERAL: PAINLEVEL: NO PAIN (0)

## 2022-05-06 NOTE — PROGRESS NOTES
Assessment & Plan     Postablative hypothyroidism -fatigue and diarrhea could be related to iatrogenic hyperthyroidism.  Decrease from 88 to 75 mcg daily.  Weight has decreased over the last 18 months, which could be the culprit for the need for the lower dose.  Recheck TSH in 2 months  - TSH with free T4 reflex  - T4 free  - levothyroxine (SYNTHROID/LEVOTHROID) 75 MCG tablet; Take 1 tablet (75 mcg) by mouth daily  - **TSH with free T4 reflex FUTURE 2mo; Future    Irritable bowel syndrome with diarrhea - diarrhea may improve with normalization of thyroid  - loperamide (IMODIUM A-D) 2 MG tablet; Take 1-2 tablets (2-4 mg) by mouth 4 times daily as needed for diarrhea    Essential hypertension  - Hemoglobin; Future  - Hemoglobin    Chronic kidney disease (CKD) stage G3b/A2, moderately decreased glomerular filtration rate (GFR) between 30-44 mL/min/1.73 square meter and albuminuria creatinine ratio between  mg/g (H) -follows with nephrology  - Albumin Random Urine Quantitative with Creat Ratio    Visit for screening mammogram  - MA SCREENING DIGITAL BILAT - Future  (s+30); Future             Tobacco Cessation:   reports that she has been smoking cigarettes. She has a 12.50 pack-year smoking history. She uses smokeless tobacco.          No follow-ups on file.    Kim Mcneil, [facility] is a 66 year old who presents for the following health issues     History of Present Illness       Reason for visit:  Prescription refills , thyroid check, blood work    She eats 0-1 servings of fruits and vegetables daily.She consumes 0 sweetened beverage(s) daily.She exercises with enough effort to increase her heart rate 20 to 29 minutes per day.  She exercises with enough effort to increase her heart rate 3 or less days per week.   She is taking medications regularly.       Chief Complaint   Patient presents with     Hypertension     Lipids     Thyroid Disease     Health  Maintenance     Not due for TD had this in 202, will due 4th covid shot today  gave Mammo card today to schedule appt          Hyperlipidemia Follow-Up      Are you regularly taking any medication or supplement to lower your cholesterol?   Yes- AM    Are you having muscle aches or other side effects that you think could be caused by your cholesterol lowering medication?  No    Hypertension Follow-up      Do you check your blood pressure regularly outside of the clinic? No     Are you following a low salt diet? Yes    Are your blood pressures ever more than 140 on the top number (systolic) OR more   than 90 on the bottom number (diastolic), for example 140/90? No    Hypothyroidism Follow-up      Since last visit, patient describes the following symptoms: dry skin, fatigue and hair loss    In jan decreased from 100 to 88, feeling a bit more tired    The diarrhea has improved a bit      IBS:  --saw GI  --wonders if there is Rx for diarrhea  --greasy foods worsen diarrhea.  --imodium does help  --has increased fiber      Current Outpatient Medications   Medication Sig Dispense Refill     ACE/ARB/ARNI NOT PRESCRIBED (INTENTIONAL) Please choose reason not prescribed from choices below.       amLODIPine (NORVASC) 5 MG tablet Take 1 tablet by mouth daily. 90 tablet 3     atorvastatin (LIPITOR) 40 MG tablet Take 1 tablet (40 mg) by mouth daily 90 tablet 3     levothyroxine (SYNTHROID/LEVOTHROID) 88 MCG tablet Take 1 tablet (88 mcg) by mouth daily 30 tablet 2     loperamide (IMODIUM) 2 MG capsule Take 2 mg by mouth 3 times daily as needed for diarrhea       potassium chloride ER (KLOR-CON M) 20 MEQ CR tablet Take 1 tablet by mouth daily       sodium bicarbonate 650 MG tablet Take 650 mg by mouth             Review of Systems   Constitutional, HEENT, cardiovascular, pulmonary, gi and gu systems are negative, except as otherwise noted.      Objective    /70 (BP Location: Right arm, Patient Position: Sitting, Cuff Size:  "Adult Regular)   Pulse 71   Temp 98.7  F (37.1  C) (Tympanic)   Resp 16   Ht 1.613 m (5' 3.5\")   Wt 58.2 kg (128 lb 4.8 oz)   LMP  (LMP Unknown)   SpO2 99%   BMI 22.37 kg/m    Body mass index is 22.37 kg/m .  Physical Exam   GENERAL APPEARANCE: alert and no distress    Results for orders placed or performed in visit on 05/06/22 (from the past 24 hour(s))   TSH with free T4 reflex   Result Value Ref Range    TSH <0.01 (L) 0.40 - 4.00 mU/L   Basic metabolic panel   Result Value Ref Range    Sodium 141 133 - 144 mmol/L    Potassium 3.2 (L) 3.4 - 5.3 mmol/L    Chloride 109 94 - 109 mmol/L    Carbon Dioxide (CO2) 23 20 - 32 mmol/L    Anion Gap 9 3 - 14 mmol/L    Urea Nitrogen 27 7 - 30 mg/dL    Creatinine 2.11 (H) 0.52 - 1.04 mg/dL    Calcium 9.7 8.5 - 10.1 mg/dL    Glucose 98 70 - 99 mg/dL    GFR Estimate 25 (L) >60 mL/min/1.73m2   Hemoglobin   Result Value Ref Range    Hemoglobin 13.9 11.7 - 15.7 g/dL   T4 free   Result Value Ref Range    Free T4 1.59 (H) 0.76 - 1.46 ng/dL               "

## 2022-05-06 NOTE — RESULT ENCOUNTER NOTE
The TSH is even lower and the T4 is now elevated, indicating a need for a dose decrease.  Decrease from 88 to 75 mcg once daily.  Fatigue and diarrhea may improve with getting TSH in normal range.  Recheck TSH in 2 months.    Hemoglobin is normal.

## 2022-05-06 NOTE — PATIENT INSTRUCTIONS
Thyroid:  Check labs today    IBS:  Rx for imdoium  If imodium is not helpful, try taking regularly 1-2 x day.  Next step would be Lomotil - safe with kidney dysfunction

## 2022-06-27 ENCOUNTER — HOSPITAL ENCOUNTER (OUTPATIENT)
Dept: MAMMOGRAPHY | Facility: CLINIC | Age: 67
Discharge: HOME | End: 2022-06-27
Attending: INTERNAL MEDICINE | Admitting: INTERNAL MEDICINE
Payer: COMMERCIAL

## 2022-06-27 DIAGNOSIS — Z12.31 VISIT FOR SCREENING MAMMOGRAM: ICD-10-CM

## 2022-06-27 PROCEDURE — 77067 SCR MAMMO BI INCL CAD: CPT

## 2022-09-27 ENCOUNTER — MYC MEDICAL ADVICE (OUTPATIENT)
Dept: FAMILY MEDICINE | Facility: CLINIC | Age: 67
End: 2022-09-27

## 2022-09-27 DIAGNOSIS — E78.2 MIXED HYPERLIPIDEMIA: ICD-10-CM

## 2022-09-27 RX ORDER — ATORVASTATIN CALCIUM 40 MG/1
40 TABLET, FILM COATED ORAL DAILY
Qty: 90 TABLET | Refills: 0 | Status: SHIPPED | OUTPATIENT
Start: 2022-09-27 | End: 2022-12-27

## 2022-09-27 NOTE — TELEPHONE ENCOUNTER
Dr. Mcneil,    Patient is given 90 day supply of her cholesterol meds but then will need a lipid drawn.  Pended for your consideration. Coleen HERNANDEZ RN

## 2022-11-14 ENCOUNTER — HOSPITAL ENCOUNTER (EMERGENCY)
Facility: CLINIC | Age: 67
Discharge: HOME | End: 2022-11-14
Attending: NURSE PRACTITIONER | Admitting: NURSE PRACTITIONER
Payer: COMMERCIAL

## 2022-11-14 VITALS
RESPIRATION RATE: 18 BRPM | HEART RATE: 75 BPM | DIASTOLIC BLOOD PRESSURE: 77 MMHG | SYSTOLIC BLOOD PRESSURE: 126 MMHG | OXYGEN SATURATION: 98 % | TEMPERATURE: 99.9 F

## 2022-11-14 DIAGNOSIS — J10.1 INFLUENZA A: ICD-10-CM

## 2022-11-14 LAB
FLUAV RNA SPEC QL NAA+PROBE: POSITIVE
FLUBV RNA RESP QL NAA+PROBE: NEGATIVE
SARS-COV-2 RNA RESP QL NAA+PROBE: NEGATIVE

## 2022-11-14 PROCEDURE — 87636 SARSCOV2 & INF A&B AMP PRB: CPT | Performed by: NURSE PRACTITIONER

## 2022-11-14 PROCEDURE — 94640 AIRWAY INHALATION TREATMENT: CPT | Performed by: NURSE PRACTITIONER

## 2022-11-14 PROCEDURE — 99214 OFFICE O/P EST MOD 30 MIN: CPT | Mod: CS | Performed by: NURSE PRACTITIONER

## 2022-11-14 PROCEDURE — 250N000013 HC RX MED GY IP 250 OP 250 PS 637: Performed by: NURSE PRACTITIONER

## 2022-11-14 PROCEDURE — C9803 HOPD COVID-19 SPEC COLLECT: HCPCS | Performed by: NURSE PRACTITIONER

## 2022-11-14 PROCEDURE — G0463 HOSPITAL OUTPT CLINIC VISIT: HCPCS | Mod: CS,25 | Performed by: NURSE PRACTITIONER

## 2022-11-14 RX ORDER — ALBUTEROL SULFATE 90 UG/1
2 AEROSOL, METERED RESPIRATORY (INHALATION) EVERY 6 HOURS PRN
Status: DISCONTINUED | OUTPATIENT
Start: 2022-11-14 | End: 2022-11-14 | Stop reason: HOSPADM

## 2022-11-14 RX ADMIN — ALBUTEROL SULFATE 2 PUFF: 90 AEROSOL, METERED RESPIRATORY (INHALATION) at 13:30

## 2022-11-14 ASSESSMENT — ACTIVITIES OF DAILY LIVING (ADL): ADLS_ACUITY_SCORE: 35

## 2022-11-14 NOTE — ED PROVIDER NOTES
History     Chief Complaint   Patient presents with     Fever     Cough     Pharyngitis     HPI  Leticia Valiente is a 67 year old female who presents to urgent care with a 3-day history of cough, nasal congestion, sore throat, bilateral ear pain, fever with highest temp of being low 100s.  Patient states her partner now has similar symptoms and reports onset of symptoms yesterday.  Patient states she has not been treating with any particular medication.  Patient denies nausea, vomiting, abdominal pain, bright red rectal bleeding, left or right-sided facial droop, suicidal or homicidal thoughts.  Past medical history is also remarkable for e-cigarette use.  Patient denies history of asthma or COPD.    Allergies:  No Known Allergies    Problem List:    Patient Active Problem List    Diagnosis Date Noted     Chronic diarrhea 01/08/2022     Priority: Medium     Acute renal failure, unspecified acute renal failure type (H) 01/08/2022     Priority: Medium     Disorder of bone and cartilage 01/07/2022     Priority: Medium     Formatting of this note might be different from the original.  Created by Conversion    Replacement Utility updated for latest IMO load       Iatrogenic hypotension 01/07/2022     Priority: Medium     Formatting of this note might be different from the original.  Created by Conversion       Stage 3b chronic kidney disease (H) 12/21/2021     Priority: Medium     Bilateral CARRILLO       Spell of altered cognition 12/21/2021     Priority: Medium     8.2014 - hospitalized at [facility] for PRES - hypertensive 210s, MRI consistent with PRES.  No further spells since then       Postablative hypothyroidism 12/21/2021     Priority: Medium     S/P total knee arthroplasty, right 09/15/2020     Priority: Medium     Bilateral renal artery stenosis (H) 02/08/2012     Priority: Medium     Found on ultrasound 2/3/12 and had lightheadedness/hypotension reaction to lisinopril in past.  Follows with [facility]  Nephrology Dr. Irvin       Atherosclerosis of renal artery (H) 02/08/2012     Priority: Medium     Formatting of this note might be different from the original.  Created by [facility] Annotation: May 15 2012  2:15PM - Freida Jc: as of 5/3/12,   angioplasty and stenting not recommended; focusing on risk management  Formatting of this note might be different from the original.  Overview:   Found on ultrasound 2/3/12 and had lightheadedness/hypotension reaction to lisinopril in past.  Seen by Dr. Isaacs 2/2/12 for CKD       Vitamin D deficiency 03/22/2010     Priority: Medium     Grave's disease 04/06/2009     Priority: Medium     Dx ~1979 postpartum.  S/p radioablation therapy x 1 and has been on replacement since then  S/p surgery of eyes x 2 for exopthalmos  IMO update changed this record. Please review for accuracy       Essential hypertension 04/06/2009     Priority: Medium     Menopause      Priority: Medium     ETOH abuse 09/21/2008     Priority: Medium     Tobacco dependence 06/18/2007     Priority: Medium        Past Medical History:    Past Medical History:   Diagnosis Date     Arthritis      Chronic alcohol abuse      Chronic kidney disease      CKD (chronic kidney disease) stage 4, GFR 15-29 ml/min (H) 01/25/2011     Convulsions (H)      Depressive disorder, not elsewhere classified 11/27/2008     Disease of thyroid gland      Graves disease      Graves disease      Hypertension      Menopause      Other convulsions      Unspecified disorder of kidney and ureter      Unspecified essential hypertension      Unspecified hypothyroidism        Past Surgical History:    Past Surgical History:   Procedure Laterality Date     BIOPSY VULVA Left 06/25/2019    Procedure: EXCISION OF LEFT GLUTEAL MASS;  Surgeon: Tadeo Childers DO;  Location: [facility];  Service: General     C THYROID ABLATION      radioactive ablation due to Graves     COLONOSCOPY  09/15/2009     COLONOSCOPY N/A 01/27/2022     Procedure: COLONOSCOPY, WITH POLYPECTOMY AND BIOPSY;  Surgeon: Merlin Dockery MD;  Location: [facility]     EYE SURGERY       HC REPAIR OF HAMMERTOE,ONE       HC REVISION OF UPPER EYELID      eyelid implants due to Graves disease     JOINT REPLACEMENT       KIDNEY STONE SURGERY       TONSILLECTOMY       TONSILLECTOMY       [facility] AFF EYE MUSCLE SURG PROC UNLISTED      superior muscle release, bilateral     [facility] REMOVAL OF KIDNEY STONE       [facility] TOTAL KNEE ARTHROPLASTY Right 2020    Procedure: TOTAL KNEE ARTHROPLASTY RIGHT;  Surgeon: Adithya Nascimento DO;  Location: [facility];  Service: Orthopedics     [facility] TOTAL KNEE ARTHROPLASTY Left 2020    Procedure: TOTAL KNEE ARTHROPLASTY LEFT;  Surgeon: Adithya Nascimento DO;  Location: [facility];  Service: Orthopedics       Family History:    Family History   Problem Relation Age of Onset     Musculoskeletal Disorder Father         multiple sclerosis     ALS Father      Diabetes Mother         Type II     Cerebrovascular Disease Mother 90.00     Arthritis Mother         OA, s/p TKA x 2.      Neurologic Disorder Mother      Hypertension Mother      Breast Cancer Cousin         maternal     Breast Cancer Cousin         maternal     Pancreatic Cancer Cousin      Seizure Disorder Brother      Seizure Disorder Brother      Hypertension Brother      Hyperlipidemia Brother      Seizure Disorder Paternal Aunt      Diabetes Maternal Grandfather      Cerebrovascular Disease Brother      Other Cancer Cousin      Thyroid Disease Maternal Grandmother      Anesthesia Reaction No family hx of        Social History:  Marital Status:  Single [1]  Social History     Tobacco Use     Smoking status: Some Days     Packs/day: 0.25     Years: 50.00     Pack years: 12.50     Types: Cigarettes     Last attempt to quit: 3/1/2020     Years since quittin.7     Smokeless tobacco: Current     Tobacco comments:     vape   Vaping Use     Vaping Use: Some days     Substances: Flavoring      Devices: [employer] tank   Substance Use Topics     Alcohol use: Yes     Comment: 2-3 on weekend/ socially. Recently quit, going through treatment     Drug use: No        Medications:    ACE/ARB/ARNI NOT PRESCRIBED (INTENTIONAL)  amLODIPine (NORVASC) 5 MG tablet  atorvastatin (LIPITOR) 40 MG tablet  levothyroxine (SYNTHROID/LEVOTHROID) 75 MCG tablet  loperamide (IMODIUM A-D) 2 MG tablet  loperamide (IMODIUM) 2 MG capsule  potassium chloride ER (KLOR-CON M) 20 MEQ CR tablet  sodium bicarbonate 650 MG tablet          Review of Systems  As mentioned above in the history present illness. All other systems were reviewed and are negative.    Physical Exam   BP: 126/77  Pulse: 75  Temp: 99.9  F (37.7  C)  Resp: 18  SpO2: 98 %      Physical Exam  Vitals and nursing note reviewed.   Constitutional:       General: She is not in acute distress.     Appearance: She is well-developed and well-nourished. She is not toxic-appearing or diaphoretic.   HENT:      Head: Normocephalic and atraumatic.      Jaw: No trismus.      Right Ear: Hearing, tympanic membrane, ear canal and external ear normal. No drainage, swelling or tenderness. No middle ear effusion. Tympanic membrane is not erythematous.      Left Ear: Hearing, tympanic membrane, ear canal and external ear normal. No drainage, swelling or tenderness.  No middle ear effusion. Tympanic membrane is not erythematous.      Nose: Congestion present. No mucosal edema, rhinorrhea or epistaxis.      Right Sinus: No maxillary sinus tenderness or frontal sinus tenderness.      Left Sinus: No maxillary sinus tenderness or frontal sinus tenderness.      Mouth/Throat:      Mouth: Oropharynx is clear and moist and mucous membranes are normal. Mucous membranes are moist.      Pharynx: Uvula midline. No pharyngeal swelling, oropharyngeal exudate, posterior oropharyngeal edema, posterior oropharyngeal erythema or uvula swelling.      Tonsils: No tonsillar exudate or tonsillar abscesses. 0  on the right. 0 on the left.   Eyes:      General: No scleral icterus.        Right eye: No discharge.         Left eye: No discharge.      Conjunctiva/sclera: Conjunctivae normal.   Cardiovascular:      Rate and Rhythm: Normal rate and regular rhythm.      Pulses: Intact distal pulses.      Heart sounds: Normal heart sounds.   Pulmonary:      Effort: Pulmonary effort is normal. No respiratory distress.      Breath sounds: Normal breath sounds. No stridor. No wheezing, rhonchi or rales.   Chest:      Chest wall: No tenderness.   Abdominal:      Tenderness: There is no abdominal tenderness.   Musculoskeletal:         General: No tenderness or edema.   Skin:     General: Skin is warm.      Findings: No erythema or rash.   Neurological:      Mental Status: She is alert and oriented to person, place, and time.   Psychiatric:         Mood and Affect: Mood and affect normal.         ED Course                 Procedures    Results for orders placed or performed during the hospital encounter of 11/14/22 (from the past 24 hour(s))   Symptomatic; Yes; 11/11/2022 Influenza A/B & SARS-CoV2 (COVID-19) Virus PCR Multiplex Nose    Specimen: Nose; Swab   Result Value Ref Range    Influenza A PCR Positive (A) Negative    Influenza B PCR Negative Negative    SARS CoV2 PCR Negative Negative    Narrative    Testing was performed using the elly SARS-CoV-2 & Influenza A/B Assay on the elly Tova System. This test should be ordered for the detection of SARS-CoV-2 and influenza viruses in individuals who meet clinical and/or epidemiological criteria. Test performance is unknown in asymptomatic patients. This test is for in vitro diagnostic use under the FDA EUA for laboratories certified under CLIA to perform moderate and/or high complexity testing. This test has not been FDA cleared or approved. A negative result does not rule out the presence of PCR inhibitors in the specimen or target RNA in concentration below the limit of detection  for the assay. If only one viral target is positive but coinfection with multiple targets is suspected, the sample should be re-tested with another FDA cleared, approved or authorized test, if coinfection would change clinical management. [facility] are certified under the Clinical Laboratory Improvement Amendments of 1988 (CLIA-88) as qualified to perform moderate and/or high complexity laboratory testing.       Medications   albuterol (PROVENTIL HFA/VENTOLIN HFA) inhaler (2 puffs Inhalation Given 11/14/22 2447)       Assessments & Plan (with Medical Decision Making)     I have reviewed the nursing notes.    I have reviewed the findings, diagnosis, plan and need for follow up with the patient.  Leticia Valiente is a 67 year old female who presents to urgent care with a 3-day history of cough, nasal congestion, sore throat, bilateral ear pain, fever with highest temp of being low 100s.  Patient states her partner now has similar symptoms and reports onset of symptoms yesterday.  Patient states she has not been treating with any particular medication.  On exam patient appears nontoxic cough is appreciated without any signs of tonsillitis or peritonsillar abscess or acute respiratory failure.  Influenza A is positive and COVID is negative.  Provided with albuterol inhaler for respiratory support.  Discussed worsening symptoms to return.  Patient discharged in stable condition.    New Prescriptions    No medications on file       Final diagnoses:   Influenza A       11/14/2022   [facility] EMERGENCY DEPT     Kim Chris, APRN CNP  11/14/22 4729

## 2022-11-14 NOTE — DISCHARGE INSTRUCTIONS
Use of the inhaler 2 puffs every 4-6 hours as needed for cough or shortness of breath.  You may use Mucinex 600 to 1200 mg by mouth help loosen up the secretions.  You may use Delsym twice daily as needed for cough.

## 2022-11-16 ENCOUNTER — TELEPHONE (OUTPATIENT)
Dept: FAMILY MEDICINE | Facility: CLINIC | Age: 67
End: 2022-11-16

## 2022-11-25 NOTE — TELEPHONE ENCOUNTER
Second Attempt.  Patient said she will call back Monday or do on Mychart.    Arabella Naylor PSC on 11/25/2022 at 12:19 PM

## 2022-12-26 DIAGNOSIS — E78.2 MIXED HYPERLIPIDEMIA: ICD-10-CM

## 2022-12-26 NOTE — TELEPHONE ENCOUNTER
"Routing refill request to provider for review/approval because:  Labs not current:  LDL    Pending Prescriptions:                       Disp   Refills    atorvastatin (LIPITOR) 40 MG tablet [Pharm*90 tab*0        Sig: TAKE 1 TABLET BY MOUTH ONCE DAILY . APPOINTMENT           REQUIRED FOR FUTURE REFILLS      Requested Prescriptions   Pending Prescriptions Disp Refills    atorvastatin (LIPITOR) 40 MG tablet [Pharmacy Med Name: Atorvastatin Calcium 40 MG Oral Tablet] 90 tablet 0     Sig: TAKE 1 TABLET BY MOUTH ONCE DAILY . APPOINTMENT REQUIRED FOR FUTURE REFILLS       Statins Protocol Failed - 12/26/2022  9:17 AM        Failed - LDL on file in past 12 months     Recent Labs   Lab Test 12/21/21  1439   LDL 49             Passed - No abnormal creatine kinase in past 12 months     No lab results found.             Passed - Recent (12 mo) or future (30 days) visit within the authorizing provider's specialty     Patient has had an office visit with the authorizing provider or a provider within the authorizing providers department within the previous 12 mos or has a future within next 30 days. See \"Patient Info\" tab in inbasket, or \"Choose Columns\" in Meds & Orders section of the refill encounter.              Passed - Medication is active on med list        Passed - Patient is age 18 or older        Passed - No active pregnancy on record        Passed - No positive pregnancy test in past 12 months           Michelle Hernandez RN on 12/26/2022 at 3:12 PM    "

## 2022-12-27 RX ORDER — ATORVASTATIN CALCIUM 40 MG/1
TABLET, FILM COATED ORAL
Qty: 90 TABLET | Refills: 0 | Status: SHIPPED | OUTPATIENT
Start: 2022-12-27 | End: 2023-03-27

## 2022-12-29 ENCOUNTER — MYC MEDICAL ADVICE (OUTPATIENT)
Dept: FAMILY MEDICINE | Facility: CLINIC | Age: 67
End: 2022-12-29

## 2022-12-30 ENCOUNTER — LAB (OUTPATIENT)
Dept: LAB | Facility: CLINIC | Age: 67
End: 2022-12-30
Payer: COMMERCIAL

## 2022-12-30 DIAGNOSIS — N18.32 STAGE 3B CHRONIC KIDNEY DISEASE (H): ICD-10-CM

## 2022-12-30 DIAGNOSIS — E89.0 POSTABLATIVE HYPOTHYROIDISM: ICD-10-CM

## 2022-12-30 DIAGNOSIS — E89.0 POSTABLATIVE HYPOTHYROIDISM: Primary | ICD-10-CM

## 2022-12-30 DIAGNOSIS — E78.2 MIXED HYPERLIPIDEMIA: ICD-10-CM

## 2022-12-30 LAB
ANION GAP SERPL CALCULATED.3IONS-SCNC: 10 MMOL/L (ref 7–15)
BUN SERPL-MCNC: 40 MG/DL (ref 8–23)
CALCIUM SERPL-MCNC: 9.5 MG/DL (ref 8.8–10.2)
CHLORIDE SERPL-SCNC: 111 MMOL/L (ref 98–107)
CHOLEST SERPL-MCNC: 238 MG/DL
CREAT SERPL-MCNC: 2.64 MG/DL (ref 0.51–0.95)
CREAT UR-MCNC: 48.1 MG/DL
DEPRECATED HCO3 PLAS-SCNC: 21 MMOL/L (ref 22–29)
GFR SERPL CREATININE-BSD FRML MDRD: 19 ML/MIN/1.73M2
GLUCOSE SERPL-MCNC: 102 MG/DL (ref 70–99)
HDLC SERPL-MCNC: 153 MG/DL
HGB BLD-MCNC: 14.3 G/DL (ref 11.7–15.7)
LDLC SERPL CALC-MCNC: 76 MG/DL
MICROALBUMIN UR-MCNC: <12 MG/L
MICROALBUMIN/CREAT UR: NORMAL MG/G{CREAT}
NONHDLC SERPL-MCNC: 85 MG/DL
POTASSIUM SERPL-SCNC: 3.3 MMOL/L (ref 3.4–5.3)
SODIUM SERPL-SCNC: 142 MMOL/L (ref 136–145)
T4 FREE SERPL-MCNC: 0.97 NG/DL (ref 0.9–1.7)
TRIGL SERPL-MCNC: 47 MG/DL
TSH SERPL DL<=0.005 MIU/L-ACNC: 9.22 UIU/ML (ref 0.3–4.2)

## 2022-12-30 PROCEDURE — 36415 COLL VENOUS BLD VENIPUNCTURE: CPT

## 2022-12-30 PROCEDURE — 84439 ASSAY OF FREE THYROXINE: CPT

## 2022-12-30 PROCEDURE — 80048 BASIC METABOLIC PNL TOTAL CA: CPT

## 2022-12-30 PROCEDURE — 85018 HEMOGLOBIN: CPT

## 2022-12-30 PROCEDURE — 84443 ASSAY THYROID STIM HORMONE: CPT

## 2022-12-30 PROCEDURE — 82043 UR ALBUMIN QUANTITATIVE: CPT

## 2022-12-30 PROCEDURE — 80061 LIPID PANEL: CPT

## 2022-12-30 PROCEDURE — 82570 ASSAY OF URINE CREATININE: CPT

## 2022-12-30 NOTE — RESULT ENCOUNTER NOTE
The cholesterol is slightly higher than last check 1 year ago.  No change the atorvastatin at this time.  The TSH is mildly elevated but the T4 is normal.  No change to the levothyroxine at this time.  Recommend to recheck in 1-2 months.  If it remains elevated, may consider adjusting the thyroid dose.  The urine is negative for protein which is good.  The hemoglobin is normal.    The kidney function is worse.  Please fax results to her nephrologist.  I recommend she follow-up with her nephrologist in the next few weeks, at a minimum contacting the clinic today to let them know that her kidney function is worse  [facility]    [address] [place] [address]    Ph: [phone]

## 2022-12-30 NOTE — LETTER
December 30, 2022      Qian M Steffanie  [address]  [place] [facility] [place] [address]        Dear ,    We are writing to inform you of your test results.    The kidney function is worse.    Please fax results to nephrologist.    I recommend pt follow-up with her nephrologist in the next few weeks, at a minimum contacting the clinic today to let them know that kidney function is worse.  [facility]    [address] [place] [address]    Ph: [phone]    Fax #: [phone]    Resulted Orders   **TSH with free T4 reflex FUTURE 2mo   Result Value Ref Range    TSH 9.22 (H) 0.30 - 4.20 uIU/mL   Lipid panel reflex to direct LDL Fasting   Result Value Ref Range    Cholesterol 238 (H) <200 mg/dL    Triglycerides 47 <150 mg/dL    Direct Measure  >=50 mg/dL    LDL Cholesterol Calculated 76 <=100 mg/dL    Non HDL Cholesterol 85 <130 mg/dL    Narrative    Cholesterol  Desirable:  <200 mg/dL    Triglycerides  Normal:  Less than 150 mg/dL  Borderline High:  150-199 mg/dL  High:  200-499 mg/dL  Very High:  Greater than or equal to 500 mg/dL    Direct Measure HDL  Female:  Greater than or equal to 50 mg/dL   Male:  Greater than or equal to 40 mg/dL    LDL Cholesterol  Desirable:  <100mg/dL  Above Desirable:  100-129 mg/dL   Borderline High:  130-159 mg/dL   High:  160-189 mg/dL   Very High:  >= 190 mg/dL    Non HDL Cholesterol  Desirable:  130 mg/dL  Above Desirable:  130-159 mg/dL  Borderline High:  160-189 mg/dL  High:  190-219 mg/dL  Very High:  Greater than or equal to 220 mg/dL   BASIC METABOLIC PANEL   Result Value Ref Range    Sodium 142 136 - 145 mmol/L    Potassium 3.3 (L) 3.4 - 5.3 mmol/L    Chloride 111 (H) 98 - 107 mmol/L    Carbon Dioxide (CO2) 21 (L) 22 - 29 mmol/L    Anion Gap 10 7 - 15 mmol/L    Urea Nitrogen 40.0 (H) 8.0 - 23.0 mg/dL    Creatinine 2.64 (H) 0.51 - 0.95 mg/dL    Calcium 9.5 8.8 - 10.2 mg/dL    Glucose 102 (H) 70 - 99 mg/dL    GFR Estimate 19 (L) >60 mL/min/1.73m2       Comment:      Effective December 21, 2021 eGFRcr in adults is calculated using the 2021 CKD-EPI creatinine equation which includes age and gender (Terrence et al., NEJM, DOI: 10.1056/VXPSyf7619997)   Albumin Random Urine Quantitative with Creat Ratio   Result Value Ref Range    Albumin Urine mg/L <12.0 mg/L      Comment:      The reference ranges have not been established in urine albumin. The results should be integrated into the clinical context for interpretation.    Albumin Urine mg/g Cr        Comment:      Unable to calculate, urine albumin and/or urine creatinine is outside detectable limits.  Microalbuminuria is defined as an albumin:creatinine ratio of 17 to 299 for males and 25 to 299 for females. A ratio of albumin:creatinine of 300 or higher is indicative of overt proteinuria.  Due to biologic variability, positive results should be confirmed by a second, first-morning random or 24-hour timed urine specimen. If there is discrepancy, a third specimen is recommended. When 2 out of 3 results are in the microalbuminuria range, this is evidence for incipient nephropathy and warrants increased efforts at glucose control, blood pressure control, and institution of therapy with an angiotensin-converting-enzyme (ACE) inhibitor (if the patient can tolerate it).      Creatinine Urine mg/dL 48.1 mg/dL      Comment:      The reference ranges have not been established in urine creatinine. The results should be integrated into the clinical context for interpretation.   Hemoglobin   Result Value Ref Range    Hemoglobin 14.3 11.7 - 15.7 g/dL   T4 free   Result Value Ref Range    Free T4 0.97 0.90 - 1.70 ng/dL       If you have any questions or concerns, please call the clinic at the number listed above.       Sincerely,      Kim Mcneil, DO

## 2022-12-30 NOTE — LETTER
December 30, 2022      Qian Valiente  [address] [id number]        Dear ,    We are writing to inform you of your test results.    {results letter list:[id number]}    Resulted Orders   **TSH with free T4 reflex FUTURE 2mo   Result Value Ref Range    TSH 9.22 (H) 0.30 - 4.20 uIU/mL   Lipid panel reflex to direct LDL Fasting   Result Value Ref Range    Cholesterol 238 (H) <200 mg/dL    Triglycerides 47 <150 mg/dL    Direct Measure  >=50 mg/dL    LDL Cholesterol Calculated 76 <=100 mg/dL    Non HDL Cholesterol 85 <130 mg/dL    Narrative    Cholesterol  Desirable:  <200 mg/dL    Triglycerides  Normal:  Less than 150 mg/dL  Borderline High:  150-199 mg/dL  High:  200-499 mg/dL  Very High:  Greater than or equal to 500 mg/dL    Direct Measure HDL  Female:  Greater than or equal to 50 mg/dL   Male:  Greater than or equal to 40 mg/dL    LDL Cholesterol  Desirable:  <100mg/dL  Above Desirable:  100-129 mg/dL   Borderline High:  130-159 mg/dL   High:  160-189 mg/dL   Very High:  >= 190 mg/dL    Non HDL Cholesterol  Desirable:  130 mg/dL  Above Desirable:  130-159 mg/dL  Borderline High:  160-189 mg/dL  High:  190-219 mg/dL  Very High:  Greater than or equal to 220 mg/dL   BASIC METABOLIC PANEL   Result Value Ref Range    Sodium 142 136 - 145 mmol/L    Potassium 3.3 (L) 3.4 - 5.3 mmol/L    Chloride 111 (H) 98 - 107 mmol/L    Carbon Dioxide (CO2) 21 (L) 22 - 29 mmol/L    Anion Gap 10 7 - 15 mmol/L    Urea Nitrogen 40.0 (H) 8.0 - 23.0 mg/dL    Creatinine 2.64 (H) 0.51 - 0.95 mg/dL    Calcium 9.5 8.8 - 10.2 mg/dL    Glucose 102 (H) 70 - 99 mg/dL    GFR Estimate 19 (L) >60 mL/min/1.73m2      Comment:      Effective December 21, 2021 eGFRcr in adults is calculated using the 2021 CKD-EPI creatinine equation which includes age and gender (Terrence murphy al., NEJM, DOI: 10.1056/ZHLVfr4923055)   Albumin Random Urine Quantitative with Creat Ratio   Result Value Ref Range    Albumin Urine mg/L <12.0 mg/L       Comment:      The reference ranges have not been established in urine albumin. The results should be integrated into the clinical context for interpretation.    Albumin Urine mg/g Cr        Comment:      Unable to calculate, urine albumin and/or urine creatinine is outside detectable limits.  Microalbuminuria is defined as an albumin:creatinine ratio of 17 to 299 for males and 25 to 299 for females. A ratio of albumin:creatinine of 300 or higher is indicative of overt proteinuria.  Due to biologic variability, positive results should be confirmed by a second, first-morning random or 24-hour timed urine specimen. If there is discrepancy, a third specimen is recommended. When 2 out of 3 results are in the microalbuminuria range, this is evidence for incipient nephropathy and warrants increased efforts at glucose control, blood pressure control, and institution of therapy with an angiotensin-converting-enzyme (ACE) inhibitor (if the patient can tolerate it).      Creatinine Urine mg/dL 48.1 mg/dL      Comment:      The reference ranges have not been established in urine creatinine. The results should be integrated into the clinical context for interpretation.   Hemoglobin   Result Value Ref Range    Hemoglobin 14.3 11.7 - 15.7 g/dL   T4 free   Result Value Ref Range    Free T4 0.97 0.90 - 1.70 ng/dL       If you have any questions or concerns, please call the clinic at the number listed above.       Sincerely,      Kim Mcneil, DO

## 2023-01-07 ENCOUNTER — HEALTH MAINTENANCE LETTER (OUTPATIENT)
Age: 68
End: 2023-01-07

## 2023-03-25 DIAGNOSIS — E78.2 MIXED HYPERLIPIDEMIA: ICD-10-CM

## 2023-03-27 RX ORDER — ATORVASTATIN CALCIUM 40 MG/1
TABLET, FILM COATED ORAL
Qty: 90 TABLET | Refills: 0 | Status: SHIPPED | OUTPATIENT
Start: 2023-03-27 | End: 2023-06-19

## 2023-04-04 ENCOUNTER — NURSE TRIAGE (OUTPATIENT)
Dept: NURSING | Facility: CLINIC | Age: 68
End: 2023-04-04
Payer: COMMERCIAL

## 2023-04-04 NOTE — TELEPHONE ENCOUNTER
Patient has a cough now since March 19th and congested with green mucus.  Fever this weekend and went away.  Patient went on a cruise also.  Tested for covid and was negative.  Patient states that she vomited after coughing so hard.  Denies bluish lips and denies difficulty breathing.  Denies chest pain.  Denies coughing up blood.  Patient will go to walk in clinic if no openings in clinic.      Reason for Disposition    SEVERE coughing spells (e.g., whooping sound after coughing, vomiting after coughing)    Additional Information    Negative: Bluish (or gray) lips or face    Negative: SEVERE difficulty breathing (e.g., struggling for each breath, speaks in single words)    Negative: Rapid onset of cough and has hives    Negative: Coughing started suddenly after medicine, an allergic food or bee sting    Negative: Difficulty breathing after exposure to flames, smoke, or fumes    Negative: Sounds like a life-threatening emergency to the triager    Negative: MODERATE difficulty breathing (e.g., speaks in phrases, SOB even at rest, pulse 100-120) and still present when not coughing    Negative: Chest pain present when not coughing    Negative: Passed out (i.e., fainted, collapsed and was not responding)    Negative: Patient sounds very sick or weak to the triager    Negative: MILD difficulty breathing (e.g., minimal/no SOB at rest, SOB with walking, pulse <100) and still present when not coughing    Negative: Coughed up > 1 tablespoon (15 ml) blood (Exception: Blood-tinged sputum.)    Negative: Fever > 103 F (39.4 C)    Negative: Fever > 101 F (38.3 C) and over 60 years of age    Negative: Fever > 100.0 F (37.8 C) and has diabetes mellitus or a weak immune system (e.g., HIV positive, cancer chemotherapy, organ transplant, splenectomy, chronic steroids)    Negative: Fever > 100.0 F (37.8 C) and bedridden (e.g., nursing home patient, stroke, chronic illness, recovering from surgery)    Negative: Increasing ankle  swelling    Negative: Wheezing is present    Protocols used: COUGH-A-OH

## 2023-04-29 DIAGNOSIS — E89.0 POSTABLATIVE HYPOTHYROIDISM: ICD-10-CM

## 2023-05-01 RX ORDER — LEVOTHYROXINE SODIUM 75 UG/1
TABLET ORAL
Qty: 90 TABLET | Refills: 0 | Status: SHIPPED | OUTPATIENT
Start: 2023-05-01 | End: 2023-05-05

## 2023-05-01 NOTE — TELEPHONE ENCOUNTER
Message given to patient with good understanding.Patient made lab appt for 5/5/23. Luisa De Jesus on 5/1/2023 at 3:31 PM

## 2023-05-01 NOTE — TELEPHONE ENCOUNTER
Pending Prescriptions:                       Disp   Refills    levothyroxine (SYNTHROID/LEVOTHROID) 75 M*90 tab*0            Sig: Take 1 tablet by mouth once daily    Routing refill request to provider for review/approval because:  Labs out of range:    TSH   Date Value Ref Range Status   12/30/2022 9.22 (H) 0.30 - 4.20 uIU/mL Final   05/06/2022 <0.01 (L) 0.40 - 4.00 mU/L Final   11/14/2011 <0.03 (L) 0.4 - 5.0 mU/L Final         Uche Fuentes RN

## 2023-05-05 ENCOUNTER — LAB (OUTPATIENT)
Dept: LAB | Facility: CLINIC | Age: 68
End: 2023-05-05
Payer: COMMERCIAL

## 2023-05-05 DIAGNOSIS — E89.0 POSTABLATIVE HYPOTHYROIDISM: ICD-10-CM

## 2023-05-05 LAB
T4 FREE SERPL-MCNC: 1.05 NG/DL (ref 0.9–1.7)
TSH SERPL DL<=0.005 MIU/L-ACNC: 6.83 UIU/ML (ref 0.3–4.2)

## 2023-05-05 PROCEDURE — 36415 COLL VENOUS BLD VENIPUNCTURE: CPT

## 2023-05-05 PROCEDURE — 84443 ASSAY THYROID STIM HORMONE: CPT

## 2023-05-05 PROCEDURE — 84439 ASSAY OF FREE THYROXINE: CPT

## 2023-05-05 RX ORDER — LEVOTHYROXINE SODIUM 75 UG/1
75 TABLET ORAL DAILY
Qty: 90 TABLET | Refills: 3 | Status: SHIPPED | OUTPATIENT
Start: 2023-05-05 | End: 2023-07-28

## 2023-05-05 NOTE — LETTER
May 10, 2023      Qian M Steffanie  [address]  [place] [address]        Dear ,    We are writing to inform you of your test results.    The TSH remains slightly elevated but improved from 4 months ago.  No change to levothyroxine dose.    Resulted Orders   TSH with free T4 reflex   Result Value Ref Range    TSH 6.83 (H) 0.30 - 4.20 uIU/mL   T4 free   Result Value Ref Range    Free T4 1.05 0.90 - 1.70 ng/dL       If you have any questions or concerns, please call the clinic at the number listed above.       Sincerely,      Kim Mcneil, DO

## 2023-06-18 ENCOUNTER — TELEPHONE (OUTPATIENT)
Dept: FAMILY MEDICINE | Facility: CLINIC | Age: 68
End: 2023-06-18
Payer: COMMERCIAL

## 2023-06-18 DIAGNOSIS — E78.2 MIXED HYPERLIPIDEMIA: ICD-10-CM

## 2023-06-19 RX ORDER — ATORVASTATIN CALCIUM 40 MG/1
TABLET, FILM COATED ORAL
Qty: 30 TABLET | Refills: 0 | Status: SHIPPED | OUTPATIENT
Start: 2023-06-19 | End: 2023-07-11

## 2023-06-19 NOTE — TELEPHONE ENCOUNTER
"Qian is due for a Medicare Annual Wellness exam with refills. Please ask her if she has enough medication to last until her appointment. Thank you!    Antoinette Dean RN      Requested Prescriptions   Pending Prescriptions Disp Refills     atorvastatin (LIPITOR) 40 MG tablet [Pharmacy Med Name: Atorvastatin Calcium 40 MG Oral Tablet] 90 tablet 0     Sig: TAKE 1 TABLET BY MOUTH ONCE DAILY . APPOINTMENT REQUIRED FOR FUTURE REFILLS       Statins Protocol Failed - 6/18/2023 12:25 PM        Failed - Recent (12 mo) or future (30 days) visit within the authorizing provider's specialty     Patient has had an office visit with the authorizing provider or a provider within the authorizing providers department within the previous 12 mos or has a future within next 30 days. See \"Patient Info\" tab in inbasket, or \"Choose Columns\" in Meds & Orders section of the refill encounter.              Passed - LDL on file in past 12 months     Recent Labs   Lab Test 12/30/22  0750   LDL 76             Passed - No abnormal creatine kinase in past 12 months     No lab results found.             Passed - Medication is active on med list        Passed - Patient is age 18 or older        Passed - No active pregnancy on record        Passed - No positive pregnancy test in past 12 months             "

## 2023-06-19 NOTE — TELEPHONE ENCOUNTER
Lisset refill given x 1 month only, patient needs in clinic office visit for further refills.   Prescription approved per [facility] Refill Protocol.  Julie Behrendt RN

## 2023-07-27 ENCOUNTER — LAB (OUTPATIENT)
Dept: LAB | Facility: CLINIC | Age: 68
End: 2023-07-27
Payer: COMMERCIAL

## 2023-07-27 DIAGNOSIS — E05.00 GRAVES' DISEASE: ICD-10-CM

## 2023-07-27 DIAGNOSIS — N18.32 STAGE 3B CHRONIC KIDNEY DISEASE (H): ICD-10-CM

## 2023-07-27 DIAGNOSIS — N18.4 CKD (CHRONIC KIDNEY DISEASE) STAGE 4, GFR 15-29 ML/MIN (H): ICD-10-CM

## 2023-07-27 DIAGNOSIS — E78.2 MIXED HYPERLIPIDEMIA: ICD-10-CM

## 2023-07-27 LAB
ANION GAP SERPL CALCULATED.3IONS-SCNC: 14 MMOL/L (ref 7–15)
BUN SERPL-MCNC: 45.7 MG/DL (ref 8–23)
CALCIUM SERPL-MCNC: 9.7 MG/DL (ref 8.8–10.2)
CHLORIDE SERPL-SCNC: 103 MMOL/L (ref 98–107)
CHOLEST SERPL-MCNC: 209 MG/DL
CREAT SERPL-MCNC: 2.55 MG/DL (ref 0.51–0.95)
CREAT UR-MCNC: 151.1 MG/DL
DEPRECATED HCO3 PLAS-SCNC: 24 MMOL/L (ref 22–29)
ERYTHROCYTE [DISTWIDTH] IN BLOOD BY AUTOMATED COUNT: 13.4 % (ref 10–15)
GFR SERPL CREATININE-BSD FRML MDRD: 20 ML/MIN/1.73M2
GLUCOSE SERPL-MCNC: 94 MG/DL (ref 70–99)
HCT VFR BLD AUTO: 44.1 % (ref 35–47)
HDLC SERPL-MCNC: 130 MG/DL
HGB BLD-MCNC: 14.3 G/DL (ref 11.7–15.7)
LDLC SERPL CALC-MCNC: 58 MG/DL
MCH RBC QN AUTO: 32.6 PG (ref 26.5–33)
MCHC RBC AUTO-ENTMCNC: 32.4 G/DL (ref 31.5–36.5)
MCV RBC AUTO: 101 FL (ref 78–100)
MICROALBUMIN UR-MCNC: <12 MG/L
MICROALBUMIN/CREAT UR: NORMAL MG/G{CREAT}
NONHDLC SERPL-MCNC: 79 MG/DL
PLATELET # BLD AUTO: 225 10E3/UL (ref 150–450)
POTASSIUM SERPL-SCNC: 3.1 MMOL/L (ref 3.4–5.3)
RBC # BLD AUTO: 4.38 10E6/UL (ref 3.8–5.2)
SODIUM SERPL-SCNC: 141 MMOL/L (ref 136–145)
T4 FREE SERPL-MCNC: 1.3 NG/DL (ref 0.9–1.7)
TRIGL SERPL-MCNC: 103 MG/DL
TSH SERPL DL<=0.005 MIU/L-ACNC: 8.69 UIU/ML (ref 0.3–4.2)
WBC # BLD AUTO: 7.5 10E3/UL (ref 4–11)

## 2023-07-27 PROCEDURE — 82043 UR ALBUMIN QUANTITATIVE: CPT

## 2023-07-27 PROCEDURE — 84439 ASSAY OF FREE THYROXINE: CPT

## 2023-07-27 PROCEDURE — 82306 VITAMIN D 25 HYDROXY: CPT

## 2023-07-27 PROCEDURE — 84443 ASSAY THYROID STIM HORMONE: CPT

## 2023-07-27 PROCEDURE — 82570 ASSAY OF URINE CREATININE: CPT

## 2023-07-27 PROCEDURE — 80048 BASIC METABOLIC PNL TOTAL CA: CPT

## 2023-07-27 PROCEDURE — 80061 LIPID PANEL: CPT

## 2023-07-27 PROCEDURE — 36415 COLL VENOUS BLD VENIPUNCTURE: CPT

## 2023-07-27 PROCEDURE — 85027 COMPLETE CBC AUTOMATED: CPT

## 2023-07-27 NOTE — LETTER
August 2, 2023      Qian Valiente  [address] [address] [facility] [address] [address]        Dear ,    We are writing to inform you of your test results.    Vitamin D level is normal     Resulted Orders   Basic metabolic panel  (Ca, Cl, CO2, Creat, Gluc, K, Na, BUN)   Result Value Ref Range    Sodium 141 136 - 145 mmol/L    Potassium 3.1 (L) 3.4 - 5.3 mmol/L    Chloride 103 98 - 107 mmol/L    Carbon Dioxide (CO2) 24 22 - 29 mmol/L    Anion Gap 14 7 - 15 mmol/L    Urea Nitrogen 45.7 (H) 8.0 - 23.0 mg/dL    Creatinine 2.55 (H) 0.51 - 0.95 mg/dL    Calcium 9.7 8.8 - 10.2 mg/dL    Glucose 94 70 - 99 mg/dL    GFR Estimate 20 (L) >60 mL/min/1.73m2   Lipid panel reflex to direct LDL Fasting   Result Value Ref Range    Cholesterol 209 (H) <200 mg/dL    Triglycerides 103 <150 mg/dL    Direct Measure  >=50 mg/dL    LDL Cholesterol Calculated 58 <=100 mg/dL    Non HDL Cholesterol 79 <130 mg/dL    Narrative    Cholesterol  Desirable:  <200 mg/dL    Triglycerides  Normal:  Less than 150 mg/dL  Borderline High:  150-199 mg/dL  High:  200-499 mg/dL  Very High:  Greater than or equal to 500 mg/dL    Direct Measure HDL  Female:  Greater than or equal to 50 mg/dL   Male:  Greater than or equal to 40 mg/dL    LDL Cholesterol  Desirable:  <100mg/dL  Above Desirable:  100-129 mg/dL   Borderline High:  130-159 mg/dL   High:  160-189 mg/dL   Very High:  >= 190 mg/dL    Non HDL Cholesterol  Desirable:  130 mg/dL  Above Desirable:  130-159 mg/dL  Borderline High:  160-189 mg/dL  High:  190-219 mg/dL  Very High:  Greater than or equal to 220 mg/dL   Albumin Random Urine Quantitative with Creat Ratio   Result Value Ref Range    Creatinine Urine mg/dL 151.1 mg/dL      Comment:      The reference ranges have not been established in urine creatinine. The results should be integrated into the clinical context for interpretation.    Albumin Urine mg/L <12.0 mg/L      Comment:      The reference ranges have not been established in urine  albumin. The results should be integrated into the clinical context for interpretation.    Albumin Urine mg/g Cr        Comment:      Unable to calculate, urine albumin and/or urine creatinine is outside detectable limits.  Microalbuminuria is defined as an albumin:creatinine ratio of 17 to 299 for males and 25 to 299 for females. A ratio of albumin:creatinine of 300 or higher is indicative of overt proteinuria.  Due to biologic variability, positive results should be confirmed by a second, first-morning random or 24-hour timed urine specimen. If there is discrepancy, a third specimen is recommended. When 2 out of 3 results are in the microalbuminuria range, this is evidence for incipient nephropathy and warrants increased efforts at glucose control, blood pressure control, and institution of therapy with an angiotensin-converting-enzyme (ACE) inhibitor (if the patient can tolerate it).     CBC with platelets   Result Value Ref Range    WBC Count 7.5 4.0 - 11.0 10e3/uL    RBC Count 4.38 3.80 - 5.20 10e6/uL    Hemoglobin 14.3 11.7 - 15.7 g/dL    Hematocrit 44.1 35.0 - 47.0 %     (H) 78 - 100 fL    MCH 32.6 26.5 - 33.0 pg    MCHC 32.4 31.5 - 36.5 g/dL    RDW 13.4 10.0 - 15.0 %    Platelet Count 225 150 - 450 10e3/uL   TSH with free T4 reflex   Result Value Ref Range    TSH 8.69 (H) 0.30 - 4.20 uIU/mL   T4 free   Result Value Ref Range    Free T4 1.30 0.90 - 1.70 ng/dL   Vitamin D Deficiency   Result Value Ref Range    Vitamin D, Total (25-Hydroxy) 35 20 - 75 ug/L    Narrative    Season, race, dietary intake, and treatment affect the concentration of 25-hydroxy-Vitamin D. Values may decrease during winter months and increase during summer months. Values 20-29 ug/L may indicate Vitamin D insufficiency and values <20 ug/L may indicate Vitamin D deficiency.    Vitamin D determination is routinely performed by an immunoassay specific for 25 hydroxyvitamin D3.  If an individual is on vitamin D2(ergocalciferol)  supplementation, please specify 25 OH vitamin D2 and D3 level determination by LCMSMS test [id number].         If you have any questions or concerns, please call the clinic at the number listed above.       Sincerely,      Kim Mcneil, DO

## 2023-07-28 ENCOUNTER — OFFICE VISIT (OUTPATIENT)
Dept: FAMILY MEDICINE | Facility: CLINIC | Age: 68
End: 2023-07-28
Payer: COMMERCIAL

## 2023-07-28 VITALS
WEIGHT: 130.9 LBS | DIASTOLIC BLOOD PRESSURE: 60 MMHG | HEIGHT: 64 IN | RESPIRATION RATE: 16 BRPM | TEMPERATURE: 98 F | BODY MASS INDEX: 22.35 KG/M2 | HEART RATE: 66 BPM | OXYGEN SATURATION: 99 % | SYSTOLIC BLOOD PRESSURE: 102 MMHG

## 2023-07-28 DIAGNOSIS — Z23 NEED FOR VACCINATION: ICD-10-CM

## 2023-07-28 DIAGNOSIS — E89.0 POSTABLATIVE HYPOTHYROIDISM: ICD-10-CM

## 2023-07-28 DIAGNOSIS — N18.4 CKD (CHRONIC KIDNEY DISEASE) STAGE 4, GFR 15-29 ML/MIN (H): Primary | ICD-10-CM

## 2023-07-28 DIAGNOSIS — I10 ESSENTIAL HYPERTENSION: ICD-10-CM

## 2023-07-28 DIAGNOSIS — Z78.0 MENOPAUSE: ICD-10-CM

## 2023-07-28 DIAGNOSIS — E78.2 MIXED HYPERLIPIDEMIA: ICD-10-CM

## 2023-07-28 DIAGNOSIS — M85.89 OSTEOPENIA OF MULTIPLE SITES: ICD-10-CM

## 2023-07-28 PROBLEM — K58.0 IRRITABLE BOWEL SYNDROME WITH DIARRHEA: Status: ACTIVE | Noted: 2023-07-28

## 2023-07-28 PROBLEM — M89.9 DISORDER OF BONE AND CARTILAGE: Status: RESOLVED | Noted: 2022-01-07 | Resolved: 2023-07-28

## 2023-07-28 PROBLEM — I95.89 IATROGENIC HYPOTENSION: Status: RESOLVED | Noted: 2022-01-07 | Resolved: 2023-07-28

## 2023-07-28 PROBLEM — K52.9 CHRONIC DIARRHEA: Status: RESOLVED | Noted: 2022-01-08 | Resolved: 2023-07-28

## 2023-07-28 PROBLEM — N17.9 ACUTE RENAL FAILURE, UNSPECIFIED ACUTE RENAL FAILURE TYPE (H): Status: RESOLVED | Noted: 2022-01-08 | Resolved: 2023-07-28

## 2023-07-28 PROBLEM — M94.9 DISORDER OF BONE AND CARTILAGE: Status: RESOLVED | Noted: 2022-01-07 | Resolved: 2023-07-28

## 2023-07-28 LAB — DEPRECATED CALCIDIOL+CALCIFEROL SERPL-MC: 35 UG/L (ref 20–75)

## 2023-07-28 PROCEDURE — 99214 OFFICE O/P EST MOD 30 MIN: CPT | Mod: 25 | Performed by: INTERNAL MEDICINE

## 2023-07-28 PROCEDURE — G0009 ADMIN PNEUMOCOCCAL VACCINE: HCPCS | Performed by: INTERNAL MEDICINE

## 2023-07-28 PROCEDURE — 90677 PCV20 VACCINE IM: CPT | Performed by: INTERNAL MEDICINE

## 2023-07-28 RX ORDER — SODIUM BICARBONATE 650 MG/1
1950 TABLET ORAL 2 TIMES DAILY
COMMUNITY
Start: 2023-07-28

## 2023-07-28 RX ORDER — ATORVASTATIN CALCIUM 40 MG/1
40 TABLET, FILM COATED ORAL DAILY
Qty: 90 TABLET | Refills: 3 | Status: SHIPPED | OUTPATIENT
Start: 2023-07-28 | End: 2024-04-18

## 2023-07-28 RX ORDER — LEVOTHYROXINE SODIUM 75 UG/1
75 TABLET ORAL DAILY
Qty: 90 TABLET | Refills: 3 | Status: SHIPPED | OUTPATIENT
Start: 2023-07-28 | End: 2023-10-05

## 2023-07-28 RX ORDER — POTASSIUM CHLORIDE 1500 MG/1
20 TABLET, EXTENDED RELEASE ORAL DAILY
COMMUNITY
End: 2023-10-27

## 2023-07-28 RX ORDER — AMLODIPINE BESYLATE 5 MG/1
5 TABLET ORAL DAILY
Qty: 90 TABLET | Refills: 3 | Status: SHIPPED | OUTPATIENT
Start: 2023-07-28 | End: 2024-04-18

## 2023-07-28 ASSESSMENT — PAIN SCALES - GENERAL: PAINLEVEL: NO PAIN (0)

## 2023-07-28 NOTE — PROGRESS NOTES
Assessment & Plan     CKD (chronic kidney disease) stage 4, GFR 15-29 ml/min (H) -updated her med list.  We will add on a vitamin D level as I do not see that is been checked recently.  She follows with nephrology group frequently  - sodium bicarbonate 650 MG tablet; Take 3 tablets (1,950 mg) by mouth 2 times daily  - DX Hip/Pelvis/Spine; Future  - Vitamin D Deficiency; Future    Essential hypertension  - stable, refill provided  - amLODIPine (NORVASC) 5 MG tablet; Take 1 tablet (5 mg) by mouth daily    Mixed hyperlipidemia -baseline cholesterol over 300 with LDL over 200 prior to starting statin.  Cholesterol is much improved with the statin  - atorvastatin (LIPITOR) 40 MG tablet; Take 1 tablet (40 mg) by mouth daily    Postablative hypothyroidism TSH is mildly elevated but she has no symptoms so will not adjust medication.  She definitely had diarrhea and fatigue on higher levothyroxine dose  - levothyroxine (SYNTHROID/LEVOTHROID) 75 MCG tablet; Take 1 tablet (75 mcg) by mouth daily    Menopause -severe osteopenia seen in 2019.  If she has advanced osteoporosis, will reach out to her nephrologist to see if they will manage this.  Otherwise we will need to refer her to endocrinology since bisphosphonates are contraindicated in advanced CKD  - DX Hip/Pelvis/Spine; Future    Osteopenia of multiple sites see above    Need for vaccination  - Pneumococcal 20 Valent Conjugate (PCV20)             Nicotine/Tobacco Cessation:  She reports that she has been smoking cigarettes. She has a 12.50 pack-year smoking history. She uses smokeless tobacco.  Nicotine/Tobacco Cessation Plan:   Self help information given to patient      Patient Instructions   Thyroid  Discussed lab, on changes to medications    Osteopenia  Recommend bone density test; if you are in the osteoporosis range, may need to follow-up with your kidney doctor or Endocrinologist; most of the medications we use to treat osteoporosis  are not safe in reduced  "kidney function    Hyperlipidemia   Refill sent    Leg itch  Unclear cause, but is improving; ok to use hydrocortisone cream.      How to quit smokin. Good podcast - [employer] podcast series; episode from 1/15/2020 entitled \"How to Quit Smoking\"     Know your reasons for quitting! Remind yourself of these reasons when you struggle with the urge to smoking. Post these reasons in a visible place such as a post-it note on your mirror in the bathroom or on the dash of your car.    Pick a quit date. Set yourself up for success by planning how to be successful. Have a strategy, including having others hold you accountable to your plans to quit.    Tell others you are quitting. Have people hold you accountable -- ask them to challenge you to quit smoking if they see you picking up a cigarette. Don't have them turn a \"blind eye\". The best people to hold you accountable are those who genuinely care about you and are around you frequently.    Remove triggers for smoking. Don't hang out with other smokers, or alternatively band them with you in attempt to quit. If you always smoke while in the car, have a strategy in place to deal with the smoking urge that is likely triggered by being in a car.     Throw away all smoking gear -- cigs, lighters, erlinda trays. Don't put them in the basement for use \"in case you fail to quit smoking\". No -- make it harder for yourself to start smoking again.    Set up a reward for yourself. This should be a non-food reward. Set aside the money that you are saving by not buying cigarettes, and have fun with it if you have not smoked for 3 months (or whatever your goal is -- it might be longer).     When you feel urge to smoke, set yourself up for success. Remove access to cigarettes -- go for walk without wallet, call a support person, delay acting on urge for 15-30 minutes or more, remind yourself of your motivations for quitting. Ok to use nicotine gum or other nicotine replacement. "     Have a replacement activity available for your hands or mouth. You might chew gum (nicotine free or otherwise) or chew toothpick. You might draw or do other activity with hands.     Be gentle with yourself -- if you start smoking, just pick yourself up again and quit again. If you smoke a few cigarettes some day, it doesn't mean the day is a failure (or that you should just smoke all you want on that day). Each cigarette that you don't smoke is a success, and so fight for each success.     If you don't manage to quit smoking on this try, try again!     Kim Mcneil, [facility] JENARO Laughlin is a 68 year old, presenting for the following health issues:  Hypertension, Lipids, Thyroid Disease, Health Maintenance (Due for shingles  pcv 20 /Dont want covid or AWV/Aware has to shingles ), and itches lower leg left         7/28/2023     6:40 AM   Additional Questions   Roomed by keira villeda   Accompanied by self         7/28/2023     6:40 AM   Patient Reported Additional Medications   Patient reports taking the following new medications POT CL MICRO ER 20MEQ TAB   Daily        History of Present Illness       Reason for visit:  Refill on meds    She eats 2-3 servings of fruits and vegetables daily.She consumes 0 sweetened beverage(s) daily.She exercises with enough effort to increase her heart rate 10 to 19 minutes per day.  She exercises with enough effort to increase her heart rate 3 or less days per week.   She is taking medications regularly.     Chief Complaint   Patient presents with    Hypertension    Lipids    Thyroid Disease    Health Maintenance     Due for shingles  pcv 20   Dont want covid or AWV  Aware has to shingles     itches lower leg left              BP Readings from Last 2 Encounters:   07/28/23 102/60   11/14/22 126/77     LDL Cholesterol Calculated (mg/dL)   Date Value   07/27/2023 58   12/30/2022 76             Hyperlipidemia Follow-Up    Are you regularly  "taking any medication or supplement to lower your cholesterol?   Yes- AM  Are you having muscle aches or other side effects that you think could be caused by your cholesterol lowering medication?  Yes- muscles aches legs lower    Hypothyroidism Follow-up    Since last visit, patient describes the following symptoms: Weight stable, no hair loss, no skin changes, no constipation, no loose stools  Last visit 5/2022, levothyroxine nwas decreased from 88 to 75 - was havin ga lot of diarrhea;  TSH has been mildly elevated since then but T4 is normal  Diarrhea and fatigued have resolved    Hypertension Follow-up    Do you check your blood pressure regularly outside of the clinic? No   Are you following a low salt diet? Yes  Are your blood pressures ever more than 140 on the top number (systolic) OR more than 90 on the bottom number (diastolic), for example 140/90? No    Concern - itch  Onset: 5 days ago  Description:   Lower leg left itch  Intensity: severe when happens  Progression of Symptoms:  improving  Accompanying Signs & Symptoms:  Was in bed sleeping , no new soaps , no new foods, lower left leg no pain    Previous history of similar problem:   none  Precipitating factors:   Worsened by: none  Alleviating factors:Improved by: cortisone  Therapies Tried and outcome: cortisone this did help           CKD  --follows with outside nephrology for bilateral CARRILLO, CKD-4   \"stage 4, progressive. The patient now has persistently abnormal kidney function with a creatinine of 2.83, up from 2.43, with a previous creatinine of 2.6 in October of 2022. GFR is now 18 ml/min. Change in creatinine was initially attributable to acute kidney injury. She is a background history of CKD stage 3 with a creatinine baseline between 1.6-1.9   Creatinine has not returned to baseline since she had an episode of acute kidney injury in January of 2022, likely due to ATN. At that time her creatinine gallo precipitously to 9.8. She was briefly " "hospitalized, and she was treated with IV fluids, discontinuation of lisinopril. \"  --in the past it was thought she had bilateral CARRILLO, however, current nephrologist does not feel this is the case;    Tobacco:  -vaping more than cigarette    Current Outpatient Medications   Medication Sig Dispense Refill    ACE/ARB/ARNI NOT PRESCRIBED (INTENTIONAL) Please choose reason not prescribed from choices below.      amLODIPine (NORVASC) 5 MG tablet Take 1 tablet by mouth daily. 90 tablet 3    ASPIRIN NOT PRESCRIBED (INTENTIONAL) Please choose reason not prescribed from choices below.      atorvastatin (LIPITOR) 40 MG tablet TAKE 1 TABLET BY MOUTH ONCE DAILY . APPOINTMENT REQUIRED FOR FUTURE REFILLS 90 tablet 0    levothyroxine (SYNTHROID/LEVOTHROID) 75 MCG tablet Take 1 tablet (75 mcg) by mouth daily 90 tablet 3    sodium bicarbonate 650 MG tablet Take 3 tablets (1,950 mg) by mouth 2 times daily      potassium chloride ER (KLOR-CON M) 20 MEQ CR tablet Take 20 mEq by mouth daily           Review of Systems   Constitutional, HEENT, cardiovascular, pulmonary, gi and gu systems are negative, except as otherwise noted.      Objective    /60 (BP Location: Right arm, Patient Position: Sitting, Cuff Size: Adult Regular)   Pulse 66   Temp 98  F (36.7  C) (Tympanic)   Resp 16   Ht 1.615 m (5' 3.58\")   Wt 59.4 kg (130 lb 14.4 oz)   LMP  (LMP Unknown)   SpO2 99%   BMI 22.76 kg/m    Body mass index is 22.76 kg/m .  Physical Exam   GENERAL APPEARANCE: healthy, alert, and no distress  RESP: lungs clear to auscultation - no rales, rhonchi or wheezes  CV: regular rates and rhythm, normal S1 S2, no S3 or S4, and no murmur, click or rub  SKIN: Mild ecchymosis of bilateral anterior lower legs    Lab on 07/27/2023   Component Date Value Ref Range Status    Sodium 07/27/2023 141  136 - 145 mmol/L Final    Potassium 07/27/2023 3.1 (L)  3.4 - 5.3 mmol/L Final    Chloride 07/27/2023 103  98 - 107 mmol/L Final    Carbon Dioxide (CO2) " 07/27/2023 24  22 - 29 mmol/L Final    Anion Gap 07/27/2023 14  7 - 15 mmol/L Final    Urea Nitrogen 07/27/2023 45.7 (H)  8.0 - 23.0 mg/dL Final    Creatinine 07/27/2023 2.55 (H)  0.51 - 0.95 mg/dL Final    Calcium 07/27/2023 9.7  8.8 - 10.2 mg/dL Final    Glucose 07/27/2023 94  70 - 99 mg/dL Final    GFR Estimate 07/27/2023 20 (L)  >60 mL/min/1.73m2 Final    Cholesterol 07/27/2023 209 (H)  <200 mg/dL Final    Triglycerides 07/27/2023 103  <150 mg/dL Final    Direct Measure HDL 07/27/2023 130  >=50 mg/dL Final    LDL Cholesterol Calculated 07/27/2023 58  <=100 mg/dL Final    Non HDL Cholesterol 07/27/2023 79  <130 mg/dL Final    Creatinine Urine mg/dL 07/27/2023 151.1  mg/dL Final    The reference ranges have not been established in urine creatinine. The results should be integrated into the clinical context for interpretation.    Albumin Urine mg/L 07/27/2023 <12.0  mg/L Final    The reference ranges have not been established in urine albumin. The results should be integrated into the clinical context for interpretation.    Albumin Urine mg/g Cr 07/27/2023    Final    Unable to calculate, urine albumin and/or urine creatinine is outside detectable limits.  Microalbuminuria is defined as an albumin:creatinine ratio of 17 to 299 for males and 25 to 299 for females. A ratio of albumin:creatinine of 300 or higher is indicative of overt proteinuria.  Due to biologic variability, positive results should be confirmed by a second, first-morning random or 24-hour timed urine specimen. If there is discrepancy, a third specimen is recommended. When 2 out of 3 results are in the microalbuminuria range, this is evidence for incipient nephropathy and warrants increased efforts at glucose control, blood pressure control, and institution of therapy with an angiotensin-converting-enzyme (ACE) inhibitor (if the patient can tolerate it).      WBC Count 07/27/2023 7.5  4.0 - 11.0 10e3/uL Final    RBC Count 07/27/2023 4.38  3.80 - 5.20  10e6/uL Final    Hemoglobin 07/27/2023 14.3  11.7 - 15.7 g/dL Final    Hematocrit 07/27/2023 44.1  35.0 - 47.0 % Final    MCV 07/27/2023 101 (H)  78 - 100 fL Final    MCH 07/27/2023 32.6  26.5 - 33.0 pg Final    MCHC 07/27/2023 32.4  31.5 - 36.5 g/dL Final    RDW 07/27/2023 13.4  10.0 - 15.0 % Final    Platelet Count 07/27/2023 225  150 - 450 10e3/uL Final    TSH 07/27/2023 8.69 (H)  0.30 - 4.20 uIU/mL Final    Free T4 07/27/2023 1.30  0.90 - 1.70 ng/dL Final     No results found for any visits on 07/28/23.  Results for orders placed or performed in visit on 07/27/23 (from the past 24 hour(s))   Basic metabolic panel  (Ca, Cl, CO2, Creat, Gluc, K, Na, BUN)   Result Value Ref Range    Sodium 141 136 - 145 mmol/L    Potassium 3.1 (L) 3.4 - 5.3 mmol/L    Chloride 103 98 - 107 mmol/L    Carbon Dioxide (CO2) 24 22 - 29 mmol/L    Anion Gap 14 7 - 15 mmol/L    Urea Nitrogen 45.7 (H) 8.0 - 23.0 mg/dL    Creatinine 2.55 (H) 0.51 - 0.95 mg/dL    Calcium 9.7 8.8 - 10.2 mg/dL    Glucose 94 70 - 99 mg/dL    GFR Estimate 20 (L) >60 mL/min/1.73m2   Lipid panel reflex to direct LDL Fasting   Result Value Ref Range    Cholesterol 209 (H) <200 mg/dL    Triglycerides 103 <150 mg/dL    Direct Measure  >=50 mg/dL    LDL Cholesterol Calculated 58 <=100 mg/dL    Non HDL Cholesterol 79 <130 mg/dL    Narrative    Cholesterol  Desirable:  <200 mg/dL    Triglycerides  Normal:  Less than 150 mg/dL  Borderline High:  150-199 mg/dL  High:  200-499 mg/dL  Very High:  Greater than or equal to 500 mg/dL    Direct Measure HDL  Female:  Greater than or equal to 50 mg/dL   Male:  Greater than or equal to 40 mg/dL    LDL Cholesterol  Desirable:  <100mg/dL  Above Desirable:  100-129 mg/dL   Borderline High:  130-159 mg/dL   High:  160-189 mg/dL   Very High:  >= 190 mg/dL    Non HDL Cholesterol  Desirable:  130 mg/dL  Above Desirable:  130-159 mg/dL  Borderline High:  160-189 mg/dL  High:  190-219 mg/dL  Very High:  Greater than or equal to 220  mg/dL   Albumin Random Urine Quantitative with Creat Ratio   Result Value Ref Range    Creatinine Urine mg/dL 151.1 mg/dL    Albumin Urine mg/L <12.0 mg/L    Albumin Urine mg/g Cr     CBC with platelets   Result Value Ref Range    WBC Count 7.5 4.0 - 11.0 10e3/uL    RBC Count 4.38 3.80 - 5.20 10e6/uL    Hemoglobin 14.3 11.7 - 15.7 g/dL    Hematocrit 44.1 35.0 - 47.0 %     (H) 78 - 100 fL    MCH 32.6 26.5 - 33.0 pg    MCHC 32.4 31.5 - 36.5 g/dL    RDW 13.4 10.0 - 15.0 %    Platelet Count 225 150 - 450 10e3/uL   TSH with free T4 reflex   Result Value Ref Range    TSH 8.69 (H) 0.30 - 4.20 uIU/mL   T4 free   Result Value Ref Range    Free T4 1.30 0.90 - 1.70 ng/dL

## 2023-07-28 NOTE — NURSING NOTE
Prior to immunization administration, verified patients identity using patient s name and date of birth. Please see Immunization Activity for additional information.     Screening Questionnaire for Adult Immunization    Are you sick today?   No   Do you have allergies to medications, food, a vaccine component or latex?   No   Have you ever had a serious reaction after receiving a vaccination?   No   Do you have a long-term health problem with heart, lung, kidney, or metabolic disease (e.g., diabetes), asthma, a blood disorder, no spleen, complement component deficiency, a cochlear implant, or a spinal fluid leak?  Are you on long-term aspirin therapy?   No   Do you have cancer, leukemia, HIV/AIDS, or any other immune system problem?   No   Do you have a parent, brother, or sister with an immune system problem?   No   In the past 3 months, have you taken medications that affect  your immune system, such as prednisone, other steroids, or anticancer drugs; drugs for the treatment of rheumatoid arthritis, Crohn s disease, or psoriasis; or have you had radiation treatments?   No   Have you had a seizure, or a brain or other nervous system problem?   No   During the past year, have you received a transfusion of blood or blood    products, or been given immune (gamma) globulin or antiviral drug?   No   For women: Are you pregnant or is there a chance you could become       pregnant during the next month?   No   Have you received any vaccinations in the past 4 weeks?   No     Immunization questionnaire answers were all negative.      Patient instructed to remain in clinic for 15 minutes afterwards, and to report any adverse reactions.     Screening performed by Margoth Lynch MA on 7/28/2023 at 7:16 AM.

## 2023-07-28 NOTE — PATIENT INSTRUCTIONS
"Thyroid  Discussed lab, on changes to medications    Osteopenia  Recommend bone density test; if you are in the osteoporosis range, may need to follow-up with your kidney doctor or Endocrinologist; most of the medications we use to treat osteoporosis  are not safe in reduced kidney function    Hyperlipidemia   Refill sent    Leg itch  Unclear cause, but is improving; ok to use hydrocortisone cream.      How to quit smokin. Good podcast - [employer] podcast series; episode from 1/15/2020 entitled \"How to Quit Smoking\"     Know your reasons for quitting! Remind yourself of these reasons when you struggle with the urge to smoking. Post these reasons in a visible place such as a post-it note on your mirror in the bathroom or on the dash of your car.    Pick a quit date. Set yourself up for success by planning how to be successful. Have a strategy, including having others hold you accountable to your plans to quit.    Tell others you are quitting. Have people hold you accountable -- ask them to challenge you to quit smoking if they see you picking up a cigarette. Don't have them turn a \"blind eye\". The best people to hold you accountable are those who genuinely care about you and are around you frequently.    Remove triggers for smoking. Don't hang out with other smokers, or alternatively band them with you in attempt to quit. If you always smoke while in the car, have a strategy in place to deal with the smoking urge that is likely triggered by being in a car.     Throw away all smoking gear -- cigs, lighters, erlinda trays. Don't put them in the basement for use \"in case you fail to quit smoking\". No -- make it harder for yourself to start smoking again.    Set up a reward for yourself. This should be a non-food reward. Set aside the money that you are saving by not buying cigarettes, and have fun with it if you have not smoked for 3 months (or whatever your goal is -- it might be longer).     When you feel urge to " smoke, set yourself up for success. Remove access to cigarettes -- go for walk without wallet, call a support person, delay acting on urge for 15-30 minutes or more, remind yourself of your motivations for quitting. Ok to use nicotine gum or other nicotine replacement.     Have a replacement activity available for your hands or mouth. You might chew gum (nicotine free or otherwise) or chew toothpick. You might draw or do other activity with hands.     Be gentle with yourself -- if you start smoking, just pick yourself up again and quit again. If you smoke a few cigarettes some day, it doesn't mean the day is a failure (or that you should just smoke all you want on that day). Each cigarette that you don't smoke is a success, and so fight for each success.     If you don't manage to quit smoking on this try, try again!

## 2023-10-04 ENCOUNTER — MYC MEDICAL ADVICE (OUTPATIENT)
Dept: FAMILY MEDICINE | Facility: CLINIC | Age: 68
End: 2023-10-04
Payer: COMMERCIAL

## 2023-10-04 NOTE — TELEPHONE ENCOUNTER
Dr. Mcneil,    Patient my charts us with 10# weight gain, swelling in hands and legs.  + SOA with laying flat.  Unable to obtain VS.  Patient did put in a call to kidney specialist without a return call.  I did scheduled her for [facility] tomorrow.  Please advise. Coleen HERNANDEZ RN

## 2023-10-05 ENCOUNTER — ANCILLARY PROCEDURE (OUTPATIENT)
Dept: GENERAL RADIOLOGY | Facility: CLINIC | Age: 68
End: 2023-10-05
Attending: NURSE PRACTITIONER
Payer: COMMERCIAL

## 2023-10-05 ENCOUNTER — OFFICE VISIT (OUTPATIENT)
Dept: FAMILY MEDICINE | Facility: CLINIC | Age: 68
End: 2023-10-05
Payer: COMMERCIAL

## 2023-10-05 VITALS
SYSTOLIC BLOOD PRESSURE: 138 MMHG | BODY MASS INDEX: 25.43 KG/M2 | WEIGHT: 146.2 LBS | TEMPERATURE: 98 F | RESPIRATION RATE: 16 BRPM | OXYGEN SATURATION: 98 % | HEART RATE: 58 BPM | DIASTOLIC BLOOD PRESSURE: 82 MMHG

## 2023-10-05 DIAGNOSIS — R06.09 EXERTIONAL DYSPNEA: ICD-10-CM

## 2023-10-05 DIAGNOSIS — E89.0 POSTABLATIVE HYPOTHYROIDISM: ICD-10-CM

## 2023-10-05 DIAGNOSIS — N18.4 CKD (CHRONIC KIDNEY DISEASE) STAGE 4, GFR 15-29 ML/MIN (H): ICD-10-CM

## 2023-10-05 DIAGNOSIS — R60.1 GENERALIZED EDEMA: Primary | ICD-10-CM

## 2023-10-05 DIAGNOSIS — R60.1 GENERALIZED EDEMA: ICD-10-CM

## 2023-10-05 LAB
ALBUMIN SERPL BCG-MCNC: 4.2 G/DL (ref 3.5–5.2)
ALP SERPL-CCNC: 42 U/L (ref 35–104)
ALT SERPL W P-5'-P-CCNC: 33 U/L (ref 0–50)
ANION GAP SERPL CALCULATED.3IONS-SCNC: 11 MMOL/L (ref 7–15)
AST SERPL W P-5'-P-CCNC: 28 U/L (ref 0–45)
BASO+EOS+MONOS # BLD AUTO: ABNORMAL 10*3/UL
BASO+EOS+MONOS NFR BLD AUTO: ABNORMAL %
BASOPHILS # BLD AUTO: 0.1 10E3/UL (ref 0–0.2)
BASOPHILS NFR BLD AUTO: 1 %
BILIRUB SERPL-MCNC: 0.3 MG/DL
BUN SERPL-MCNC: 27.5 MG/DL (ref 8–23)
CALCIUM SERPL-MCNC: 9.3 MG/DL (ref 8.8–10.2)
CHLORIDE SERPL-SCNC: 107 MMOL/L (ref 98–107)
CREAT SERPL-MCNC: 1.78 MG/DL (ref 0.51–0.95)
CREAT UR-MCNC: 29.2 MG/DL
DEPRECATED HCO3 PLAS-SCNC: 26 MMOL/L (ref 22–29)
EGFRCR SERPLBLD CKD-EPI 2021: 31 ML/MIN/1.73M2
EOSINOPHIL # BLD AUTO: 0.1 10E3/UL (ref 0–0.7)
EOSINOPHIL NFR BLD AUTO: 2 %
ERYTHROCYTE [DISTWIDTH] IN BLOOD BY AUTOMATED COUNT: 13.8 % (ref 10–15)
GLUCOSE SERPL-MCNC: 89 MG/DL (ref 70–99)
HCT VFR BLD AUTO: 40.9 % (ref 35–47)
HGB BLD-MCNC: 13.1 G/DL (ref 11.7–15.7)
IMM GRANULOCYTES # BLD: 0 10E3/UL
IMM GRANULOCYTES NFR BLD: 0 %
LYMPHOCYTES # BLD AUTO: 1.6 10E3/UL (ref 0.8–5.3)
LYMPHOCYTES NFR BLD AUTO: 28 %
MCH RBC QN AUTO: 32.7 PG (ref 26.5–33)
MCHC RBC AUTO-ENTMCNC: 32 G/DL (ref 31.5–36.5)
MCV RBC AUTO: 102 FL (ref 78–100)
MICROALBUMIN UR-MCNC: <12 MG/L
MICROALBUMIN/CREAT UR: NORMAL MG/G{CREAT}
MONOCYTES # BLD AUTO: 0.4 10E3/UL (ref 0–1.3)
MONOCYTES NFR BLD AUTO: 7 %
NEUTROPHILS # BLD AUTO: 3.6 10E3/UL (ref 1.6–8.3)
NEUTROPHILS NFR BLD AUTO: 62 %
NT-PROBNP SERPL-MCNC: 1584 PG/ML (ref 0–900)
PLATELET # BLD AUTO: 187 10E3/UL (ref 150–450)
POTASSIUM SERPL-SCNC: 4.5 MMOL/L (ref 3.4–5.3)
PROT SERPL-MCNC: 6.2 G/DL (ref 6.4–8.3)
RBC # BLD AUTO: 4.01 10E6/UL (ref 3.8–5.2)
SODIUM SERPL-SCNC: 144 MMOL/L (ref 135–145)
T4 FREE SERPL-MCNC: 0.91 NG/DL (ref 0.9–1.7)
TSH SERPL DL<=0.005 MIU/L-ACNC: 21.64 UIU/ML (ref 0.3–4.2)
WBC # BLD AUTO: 5.8 10E3/UL (ref 4–11)

## 2023-10-05 PROCEDURE — 83880 ASSAY OF NATRIURETIC PEPTIDE: CPT | Performed by: NURSE PRACTITIONER

## 2023-10-05 PROCEDURE — 84439 ASSAY OF FREE THYROXINE: CPT | Performed by: NURSE PRACTITIONER

## 2023-10-05 PROCEDURE — 93000 ELECTROCARDIOGRAM COMPLETE: CPT | Performed by: NURSE PRACTITIONER

## 2023-10-05 PROCEDURE — 82043 UR ALBUMIN QUANTITATIVE: CPT | Performed by: NURSE PRACTITIONER

## 2023-10-05 PROCEDURE — 80053 COMPREHEN METABOLIC PANEL: CPT | Performed by: NURSE PRACTITIONER

## 2023-10-05 PROCEDURE — 82570 ASSAY OF URINE CREATININE: CPT | Performed by: NURSE PRACTITIONER

## 2023-10-05 PROCEDURE — 71046 X-RAY EXAM CHEST 2 VIEWS: CPT | Mod: TC | Performed by: RADIOLOGY

## 2023-10-05 PROCEDURE — 85025 COMPLETE CBC W/AUTO DIFF WBC: CPT | Performed by: NURSE PRACTITIONER

## 2023-10-05 PROCEDURE — 84443 ASSAY THYROID STIM HORMONE: CPT | Performed by: NURSE PRACTITIONER

## 2023-10-05 PROCEDURE — 36415 COLL VENOUS BLD VENIPUNCTURE: CPT | Performed by: NURSE PRACTITIONER

## 2023-10-05 PROCEDURE — 99214 OFFICE O/P EST MOD 30 MIN: CPT | Mod: 25 | Performed by: NURSE PRACTITIONER

## 2023-10-05 RX ORDER — LEVOTHYROXINE SODIUM 100 UG/1
100 TABLET ORAL DAILY
Qty: 90 TABLET | Refills: 1 | Status: SHIPPED | OUTPATIENT
Start: 2023-10-05 | End: 2024-04-01

## 2023-10-05 RX ORDER — TORSEMIDE 10 MG/1
10 TABLET ORAL DAILY
Qty: 30 TABLET | Refills: 0 | Status: SHIPPED | OUTPATIENT
Start: 2023-10-05 | End: 2024-04-18

## 2023-10-05 ASSESSMENT — PAIN SCALES - GENERAL: PAINLEVEL: NO PAIN (0)

## 2023-10-05 NOTE — PATIENT INSTRUCTIONS
Please contact the cardiac testing department at [phone] to schedule echocardiogram.  Lab test one hour prior to appointment with Dr. Mcneil.  Start torsemide once daily. Continue to take daily weights and daily BP, bring these to Dr. Mcneil.  Continue your potassium supplement.  If you have worsening symptoms, chest pain or shortness of breath, go to ER.

## 2023-10-11 ENCOUNTER — OFFICE VISIT (OUTPATIENT)
Dept: FAMILY MEDICINE | Facility: CLINIC | Age: 68
End: 2023-10-11
Payer: COMMERCIAL

## 2023-10-11 ENCOUNTER — LAB (OUTPATIENT)
Dept: LAB | Facility: CLINIC | Age: 68
End: 2023-10-11
Payer: COMMERCIAL

## 2023-10-11 VITALS
RESPIRATION RATE: 14 BRPM | BODY MASS INDEX: 24.52 KG/M2 | HEIGHT: 63 IN | DIASTOLIC BLOOD PRESSURE: 74 MMHG | WEIGHT: 138.4 LBS | TEMPERATURE: 96.9 F | SYSTOLIC BLOOD PRESSURE: 112 MMHG | OXYGEN SATURATION: 95 % | HEART RATE: 67 BPM

## 2023-10-11 DIAGNOSIS — N18.4 CKD (CHRONIC KIDNEY DISEASE) STAGE 4, GFR 15-29 ML/MIN (H): ICD-10-CM

## 2023-10-11 DIAGNOSIS — R06.09 EXERTIONAL DYSPNEA: ICD-10-CM

## 2023-10-11 DIAGNOSIS — N18.4 CKD (CHRONIC KIDNEY DISEASE) STAGE 4, GFR 15-29 ML/MIN (H): Primary | ICD-10-CM

## 2023-10-11 DIAGNOSIS — E89.0 POSTABLATIVE HYPOTHYROIDISM: Primary | ICD-10-CM

## 2023-10-11 DIAGNOSIS — R60.1 GENERALIZED EDEMA: ICD-10-CM

## 2023-10-11 LAB
ANION GAP SERPL CALCULATED.3IONS-SCNC: 12 MMOL/L (ref 7–15)
BUN SERPL-MCNC: 60.1 MG/DL (ref 8–23)
CALCIUM SERPL-MCNC: 10.8 MG/DL (ref 8.8–10.2)
CHLORIDE SERPL-SCNC: 99 MMOL/L (ref 98–107)
CREAT SERPL-MCNC: 3.01 MG/DL (ref 0.51–0.95)
DEPRECATED HCO3 PLAS-SCNC: 30 MMOL/L (ref 22–29)
EGFRCR SERPLBLD CKD-EPI 2021: 16 ML/MIN/1.73M2
GLUCOSE SERPL-MCNC: 102 MG/DL (ref 70–99)
POTASSIUM SERPL-SCNC: 4.1 MMOL/L (ref 3.4–5.3)
SODIUM SERPL-SCNC: 141 MMOL/L (ref 135–145)

## 2023-10-11 PROCEDURE — 80048 BASIC METABOLIC PNL TOTAL CA: CPT

## 2023-10-11 PROCEDURE — 36415 COLL VENOUS BLD VENIPUNCTURE: CPT

## 2023-10-11 PROCEDURE — 99214 OFFICE O/P EST MOD 30 MIN: CPT | Performed by: INTERNAL MEDICINE

## 2023-10-11 ASSESSMENT — PAIN SCALES - GENERAL: PAINLEVEL: NO PAIN (0)

## 2023-10-11 NOTE — RESULT ENCOUNTER NOTE
The kidney function is lower than baseline, not unexpected due to recent diuretic use.  Stop the torsemide as discussed at the time of visit.  Recommend to recheck kidney function next week, order placed in another encounter

## 2023-10-11 NOTE — PROGRESS NOTES
Assessment & Plan   Problem List Items Addressed This Visit       CKD (chronic kidney disease) stage 4, GFR 15-29 ml/min (H)    Postablative hypothyroidism - Primary    Relevant Orders    TSH    T4 FREE           Patient Instructions     Thyroid  Continue levothyroxine 100 mcg daily  Recheck thyroid in early Dec - lab only appointment   Review how to take thyroid dose as below    Administer consistently in the morning on an empty stomach, at least 30 to 60 minutes before food. Alternatively, may consistently administer at night 3 to 4 hours after the last meal. Do not administer within 4 hours of calcium- or iron-containing products     Swelling  Blood work in process  2. You can stop the torsemide.  If the swelling creeps on, mild, ok to take torsemide for 1-2 days.  3. If swelling is much worse off the torsemide, let me know  4. Keep appointment for echo      Constipation  Try fiber or miralax - see below.    For constipation:   1. Consume at least 8 glasses of water per day   2. Exercise for at least 30 minutes every day. Regular exercise helps to keep your digestive system active and and healthy and may help with constipation.   3. Take advantage of opportunities - you are more like to have a bowel movement after waking and after eating. Do not postpone having a bowel movement if you feel the urge to go.   4. Increase dietary fiber. Goal of 25 grams per day for women, 35 grams per day for men. If unable to consume 25-35 grams through diet alone consider OTC supplements. Metamucil is the best choice. It requires the use of plenty of water to be effective. Can take days to weeks to take effect.   5. Prunes can be just as effective as Metamucil. 10 prunes = 6 grams of fiber.   6. Recommend taking Miralax (17 grams = 1 scoop). Take once daily, can mix with anything. If you experience increasing bowel movements and diarrhea, decrease to every other day or every 3rd day. Miralax is an osmotic laxative that increases  the amount of water secreted by the intestines resulting in softer and easier to pass stools. It can take 1-4 days to work. It may be taken chronically if you drink enough water throughout the day.   7. If Miralax isn't enough, recommend stimulant laxative such as Senna, Ex Lax or Dulcolax. Stimulant laxatives speed up the colonic motility of your gut helping to induce a bowel movement. Take as needed at       Kim Mcneil DO  [facility] is [facility] 68 year [facility], presenting for the following health issues:  Edema and Health Maintenance (No do AWV/No to shot )        10/11/2023     6:47 AM   Additional Questions   Roomed by keira villeda   Accompanied by self         10/11/2023     6:47 AM   Patient Reported Additional Medications   Patient reports taking the following new medications none       HPI     Chief Complaint   Patient presents with    Edema    Health Maintenance     No do AWV  No to shot            Concern - edema  Onset: 2 weeks  Description: legs and hands face  Intensity: mild  Progression of Symptoms:  improving  Accompanying Signs & Symptoms: legs hands face, got some blood work today   Previous history of similar problem: none  Precipitating factors:        Worsened by: none  Alleviating factors:        Improved by: ice joints  Therapies tried and outcome: putting ice on the joints   --she was seen 10/5 for leg and face swelling, onset a few days prior to appointment.  --the TSH was 21.  Levothyroxine increased to 100 and torsemide was started for swelling  --echo was ordered, 1st available not for several weeks  --is having new constipation since swelling started  --has been eating healthier the last 3 months because partner had bypass surgery - more veggies, less red meat, more lean meat, lower salt    Thyroid  --takes 1st thing in AM, with all her other med    Wt Readings from Last 5 Encounters:   10/11/23 62.8 kg (138 lb 6.4 oz)   10/05/23 66.3 kg (146 lb  "3.2 oz)   07/28/23 59.4 kg (130 lb 14.4 oz)   05/06/22 58.2 kg (128 lb 4.8 oz)   02/14/22 58.5 kg (129 lb)           Current Outpatient Medications   Medication Sig Dispense Refill    ACE/ARB/ARNI NOT PRESCRIBED (INTENTIONAL) Please choose reason not prescribed from choices below.      amLODIPine (NORVASC) 5 MG tablet Take 1 tablet (5 mg) by mouth daily 90 tablet 3    ASPIRIN NOT PRESCRIBED (INTENTIONAL) Please choose reason not prescribed from choices below.      atorvastatin (LIPITOR) 40 MG tablet Take 1 tablet (40 mg) by mouth daily 90 tablet 3    levothyroxine (SYNTHROID/LEVOTHROID) 100 MCG tablet Take 1 tablet (100 mcg) by mouth daily 90 tablet 1    potassium chloride ER (KLOR-CON M) 20 MEQ CR tablet Take 20 mEq by mouth daily      sodium bicarbonate 650 MG tablet Take 3 tablets (1,950 mg) by mouth 2 times daily      torsemide (DEMADEX) 10 MG tablet Take 1 tablet (10 mg) by mouth daily 30 tablet 0           Review of Systems   Constitutional, HEENT, cardiovascular, pulmonary, gi and gu systems are negative, except as otherwise noted.      Objective    /74 (BP Location: Right arm, Patient Position: Sitting, Cuff Size: Adult Regular)   Pulse 67   Temp 96.9  F (36.1  C) (Tympanic)   Resp 14   Ht 1.6 m (5' 2.99\")   Wt 62.8 kg (138 lb 6.4 oz)   LMP  (LMP Unknown)   SpO2 95%   BMI 24.52 kg/m    Body mass index is 24.52 kg/m .  Physical Exam   GENERAL APPEARANCE: healthy, alert, and no distress  RESP: lungs clear to auscultation - no rales, rhonchi or wheezes  CV: regular rates and rhythm, normal S1 S2, no S3 or S4, and no murmur, click or rub  LYMPHATICS: trace bilateral ankle edema    No results found for this or any previous visit (from the past 24 hour(s)).                  "

## 2023-10-11 NOTE — PATIENT INSTRUCTIONS
Thyroid  Continue levothyroxine 100 mcg daily  Recheck thyroid in early Dec - lab only appointment   Review how to take thyroid dose as below    Administer consistently in the morning on an empty stomach, at least 30 to 60 minutes before food. Alternatively, may consistently administer at night 3 to 4 hours after the last meal. Do not administer within 4 hours of calcium- or iron-containing products     Swelling  Blood work in process  2. You can stop the torsemide.  If the swelling creeps on, mild, ok to take torsemide for 1-2 days.  3. If swelling is much worse off the torsemide, let me know  4. Keep appointment for echo      Constipation  Try fiber or miralax - see below.    For constipation:   1. Consume at least 8 glasses of water per day   2. Exercise for at least 30 minutes every day. Regular exercise helps to keep your digestive system active and and healthy and may help with constipation.   3. Take advantage of opportunities - you are more like to have a bowel movement after waking and after eating. Do not postpone having a bowel movement if you feel the urge to go.   4. Increase dietary fiber. Goal of 25 grams per day for women, 35 grams per day for men. If unable to consume 25-35 grams through diet alone consider OTC supplements. Metamucil is the best choice. It requires the use of plenty of water to be effective. Can take days to weeks to take effect.   5. Prunes can be just as effective as Metamucil. 10 prunes = 6 grams of fiber.   6. Recommend taking Miralax (17 grams = 1 scoop). Take once daily, can mix with anything. If you experience increasing bowel movements and diarrhea, decrease to every other day or every 3rd day. Miralax is an osmotic laxative that increases the amount of water secreted by the intestines resulting in softer and easier to pass stools. It can take 1-4 days to work. It may be taken chronically if you drink enough water throughout the day.   7. If Miralax isn't enough, recommend  stimulant laxative such as Senna, Ex Lax or Dulcolax. Stimulant laxatives speed up the colonic motility of your gut helping to induce a bowel movement. Take as needed at

## 2023-10-19 ENCOUNTER — LAB (OUTPATIENT)
Dept: LAB | Facility: CLINIC | Age: 68
End: 2023-10-19
Payer: COMMERCIAL

## 2023-10-19 DIAGNOSIS — N18.4 CKD (CHRONIC KIDNEY DISEASE) STAGE 4, GFR 15-29 ML/MIN (H): ICD-10-CM

## 2023-10-19 DIAGNOSIS — E87.6 HYPOKALEMIA: Primary | ICD-10-CM

## 2023-10-19 LAB
ANION GAP SERPL CALCULATED.3IONS-SCNC: 17 MMOL/L (ref 7–15)
BUN SERPL-MCNC: 57.9 MG/DL (ref 8–23)
CALCIUM SERPL-MCNC: 9.9 MG/DL (ref 8.8–10.2)
CHLORIDE SERPL-SCNC: 98 MMOL/L (ref 98–107)
CREAT SERPL-MCNC: 2.68 MG/DL (ref 0.51–0.95)
DEPRECATED HCO3 PLAS-SCNC: 21 MMOL/L (ref 22–29)
EGFRCR SERPLBLD CKD-EPI 2021: 19 ML/MIN/1.73M2
GLUCOSE SERPL-MCNC: 93 MG/DL (ref 70–99)
POTASSIUM SERPL-SCNC: 3 MMOL/L (ref 3.4–5.3)
SODIUM SERPL-SCNC: 136 MMOL/L (ref 135–145)

## 2023-10-19 PROCEDURE — 80048 BASIC METABOLIC PNL TOTAL CA: CPT

## 2023-10-19 PROCEDURE — 36415 COLL VENOUS BLD VENIPUNCTURE: CPT

## 2023-10-19 RX ORDER — POTASSIUM BICARBONATE 782 MG/1
20 TABLET, EFFERVESCENT ORAL 2 TIMES DAILY
Qty: 180 TABLET | Refills: 0 | Status: SHIPPED | OUTPATIENT
Start: 2023-10-19 | End: 2023-10-27

## 2023-10-19 NOTE — RESULT ENCOUNTER NOTE
The kidney function has improved, closer to baseline values.  The potassium is quite low.  Please confirm with patient how much potassium she is taking.  At her kidney doctors last visit the summer, it was recommended to take the medicine as below.    potassium bicarbonate-citric acid (EFFER-K) 20 MEQ effervescent tablet   Take 1 Tablet (20 mEq) by mouth two times a day. Allow tablet to dissolve completely in 3-4 ounces of cold juice of choice prior to administration.    However, our list has a different potassium that she is taking.

## 2023-10-19 NOTE — RESULT ENCOUNTER NOTE
I need to know what dose of potassium she is taking and make a recommendation to increase dose and recheck potassium

## 2023-10-26 ENCOUNTER — HOSPITAL ENCOUNTER (OUTPATIENT)
Dept: CARDIOLOGY | Facility: HOSPITAL | Age: 68
Discharge: HOME | End: 2023-10-26
Attending: NURSE PRACTITIONER | Admitting: NURSE PRACTITIONER
Payer: COMMERCIAL

## 2023-10-26 DIAGNOSIS — R60.1 GENERALIZED EDEMA: ICD-10-CM

## 2023-10-26 DIAGNOSIS — R06.09 EXERTIONAL DYSPNEA: ICD-10-CM

## 2023-10-26 LAB — LVEF ECHO: NORMAL

## 2023-10-26 PROCEDURE — 93306 TTE W/DOPPLER COMPLETE: CPT | Mod: 26 | Performed by: GENERAL ACUTE CARE HOSPITAL

## 2023-10-26 PROCEDURE — 93306 TTE W/DOPPLER COMPLETE: CPT

## 2023-10-27 ENCOUNTER — LAB (OUTPATIENT)
Dept: LAB | Facility: CLINIC | Age: 68
End: 2023-10-27
Payer: COMMERCIAL

## 2023-10-27 DIAGNOSIS — E87.6 HYPOKALEMIA: ICD-10-CM

## 2023-10-27 DIAGNOSIS — E05.00 GRAVES' DISEASE: Primary | ICD-10-CM

## 2023-10-27 DIAGNOSIS — E89.0 POSTABLATIVE HYPOTHYROIDISM: ICD-10-CM

## 2023-10-27 LAB
ANION GAP SERPL CALCULATED.3IONS-SCNC: 16 MMOL/L (ref 7–15)
BUN SERPL-MCNC: 46.5 MG/DL (ref 8–23)
CALCIUM SERPL-MCNC: 9.9 MG/DL (ref 8.8–10.2)
CHLORIDE SERPL-SCNC: 103 MMOL/L (ref 98–107)
CREAT SERPL-MCNC: 2.62 MG/DL (ref 0.51–0.95)
DEPRECATED HCO3 PLAS-SCNC: 20 MMOL/L (ref 22–29)
EGFRCR SERPLBLD CKD-EPI 2021: 19 ML/MIN/1.73M2
GLUCOSE SERPL-MCNC: 100 MG/DL (ref 70–99)
POTASSIUM SERPL-SCNC: 3.2 MMOL/L (ref 3.4–5.3)
SODIUM SERPL-SCNC: 139 MMOL/L (ref 135–145)
T4 FREE SERPL-MCNC: 1.61 NG/DL (ref 0.9–1.7)
TSH SERPL DL<=0.005 MIU/L-ACNC: 13.31 UIU/ML (ref 0.3–4.2)

## 2023-10-27 PROCEDURE — 84439 ASSAY OF FREE THYROXINE: CPT

## 2023-10-27 PROCEDURE — 84443 ASSAY THYROID STIM HORMONE: CPT

## 2023-10-27 PROCEDURE — 36415 COLL VENOUS BLD VENIPUNCTURE: CPT

## 2023-10-27 PROCEDURE — 80048 BASIC METABOLIC PNL TOTAL CA: CPT

## 2023-10-27 RX ORDER — POTASSIUM BICARBONATE 782 MG/1
40 TABLET, EFFERVESCENT ORAL 2 TIMES DAILY
Qty: 180 TABLET | Refills: 0 | Status: SHIPPED | OUTPATIENT
Start: 2023-10-27 | End: 2023-11-02

## 2023-10-27 NOTE — RESULT ENCOUNTER NOTE
Thyroid is improving but still not at normal values.  Repeat the thyroid test in early December.  Kidney function is similar to last check 1 week ago.  The potassium is improved, but still too low.  Recommend to double the potassium from 20 mEq twice a day to 40 mEq twice a day.  Recheck potassium in 1 week.  Ensure they only draw the potassium and not the thyroid

## 2023-10-29 ENCOUNTER — TELEPHONE (OUTPATIENT)
Dept: FAMILY MEDICINE | Facility: CLINIC | Age: 68
End: 2023-10-29
Payer: COMMERCIAL

## 2023-10-29 NOTE — TELEPHONE ENCOUNTER
Prior Authorization Retail Medication Request    Medication/Dose: Effer-k 20 meq tab  ICD code (if different than what is on RX):    Previously Tried and Failed:  klor con  Rationale:      Insurance Name:  not provided  Insurance ID:  not provided  [facility] Key: [id number]      Pharmacy Information (if different than what is on RX)  Name:    Phone:

## 2023-11-01 NOTE — TELEPHONE ENCOUNTER
Central Prior Authorization Team  Phone: [phone]    PA Initiation    Medication: EFFER-K 20 MEQ PO TBEF  Insurance Company: [employer] ([facility]) - Phone [phone] Fax [phone]  Pharmacy Filling the Rx: [facility] [place], [place] - [address]  Filling Pharmacy Phone: [phone]  Filling Pharmacy Fax:    Start Date: 11/1/2023

## 2023-11-08 ENCOUNTER — LAB (OUTPATIENT)
Dept: LAB | Facility: CLINIC | Age: 68
End: 2023-11-08
Payer: COMMERCIAL

## 2023-11-08 DIAGNOSIS — E87.6 HYPOKALEMIA: ICD-10-CM

## 2023-11-08 LAB
ANION GAP SERPL CALCULATED.3IONS-SCNC: 15 MMOL/L (ref 7–15)
BUN SERPL-MCNC: 42.4 MG/DL (ref 8–23)
CALCIUM SERPL-MCNC: 10.1 MG/DL (ref 8.8–10.2)
CHLORIDE SERPL-SCNC: 106 MMOL/L (ref 98–107)
CREAT SERPL-MCNC: 2.61 MG/DL (ref 0.51–0.95)
DEPRECATED HCO3 PLAS-SCNC: 20 MMOL/L (ref 22–29)
EGFRCR SERPLBLD CKD-EPI 2021: 19 ML/MIN/1.73M2
GLUCOSE SERPL-MCNC: 108 MG/DL (ref 70–99)
POTASSIUM SERPL-SCNC: 4.2 MMOL/L (ref 3.4–5.3)
SODIUM SERPL-SCNC: 141 MMOL/L (ref 135–145)

## 2023-11-08 PROCEDURE — 36415 COLL VENOUS BLD VENIPUNCTURE: CPT

## 2023-11-08 PROCEDURE — 80048 BASIC METABOLIC PNL TOTAL CA: CPT

## 2023-11-08 NOTE — LETTER
November 15, 2023      Qian Valiente  [address]  [place] [address]        Dear ,    We are writing to inform you of your test results.    Potassium is much improved. Kidney function is stable, at baseline.    Resulted Orders   Basic metabolic panel  (Ca, Cl, CO2, Creat, Gluc, K, Na, BUN)   Result Value Ref Range    Sodium 141 135 - 145 mmol/L      Comment:      Reference intervals for this test were updated on 09/26/2023 to more accurately reflect our healthy population. There may be differences in the flagging of prior results with similar values performed with this method. Interpretation of those prior results can be made in the context of the updated reference intervals.     Potassium 4.2 3.4 - 5.3 mmol/L    Chloride 106 98 - 107 mmol/L    Carbon Dioxide (CO2) 20 (L) 22 - 29 mmol/L    Anion Gap 15 7 - 15 mmol/L    Urea Nitrogen 42.4 (H) 8.0 - 23.0 mg/dL    Creatinine 2.61 (H) 0.51 - 0.95 mg/dL    GFR Estimate 19 (L) >60 mL/min/1.73m2    Calcium 10.1 8.8 - 10.2 mg/dL    Glucose 108 (H) 70 - 99 mg/dL       If you have any questions or concerns, please call the clinic at the number listed above.       Sincerely,      Kim Mcneil, DO

## 2024-02-10 ENCOUNTER — HEALTH MAINTENANCE LETTER (OUTPATIENT)
Age: 69
End: 2024-02-10

## 2024-04-01 DIAGNOSIS — E89.0 POSTABLATIVE HYPOTHYROIDISM: ICD-10-CM

## 2024-04-01 RX ORDER — LEVOTHYROXINE SODIUM 100 UG/1
100 TABLET ORAL DAILY
Qty: 90 TABLET | Refills: 0 | Status: SHIPPED | OUTPATIENT
Start: 2024-04-01 | End: 2024-04-18

## 2024-04-02 NOTE — TELEPHONE ENCOUNTER
Results given to patient with good understanding.    Scheduled patient for TSH lab on 4/12/24. Luisa De Jesus on 4/2/2024 at 10:43 AM

## 2024-04-06 DIAGNOSIS — E87.6 HYPOKALEMIA: ICD-10-CM

## 2024-04-08 RX ORDER — POTASSIUM CHLORIDE 1500 MG/1
40 TABLET, EXTENDED RELEASE ORAL 2 TIMES DAILY
Qty: 360 TABLET | Refills: 1 | Status: SHIPPED | OUTPATIENT
Start: 2024-04-08 | End: 2024-04-18

## 2024-04-12 ENCOUNTER — LAB (OUTPATIENT)
Dept: LAB | Facility: CLINIC | Age: 69
End: 2024-04-12
Payer: COMMERCIAL

## 2024-04-12 ENCOUNTER — MYC MEDICAL ADVICE (OUTPATIENT)
Dept: FAMILY MEDICINE | Facility: CLINIC | Age: 69
End: 2024-04-12

## 2024-04-12 DIAGNOSIS — E05.00 GRAVES' DISEASE: ICD-10-CM

## 2024-04-12 DIAGNOSIS — N18.4 CKD (CHRONIC KIDNEY DISEASE) STAGE 4, GFR 15-29 ML/MIN (H): Primary | ICD-10-CM

## 2024-04-12 LAB
ANION GAP SERPL CALCULATED.3IONS-SCNC: 12 MMOL/L (ref 7–15)
BUN SERPL-MCNC: 60.1 MG/DL (ref 8–23)
CALCIUM SERPL-MCNC: 9.5 MG/DL (ref 8.8–10.2)
CHLORIDE SERPL-SCNC: 105 MMOL/L (ref 98–107)
CREAT SERPL-MCNC: 1.98 MG/DL (ref 0.51–0.95)
CREAT UR-MCNC: 38.3 MG/DL
DEPRECATED HCO3 PLAS-SCNC: 25 MMOL/L (ref 22–29)
EGFRCR SERPLBLD CKD-EPI 2021: 27 ML/MIN/1.73M2
GLUCOSE SERPL-MCNC: 88 MG/DL (ref 70–99)
HGB BLD-MCNC: 13.6 G/DL (ref 11.7–15.7)
MICROALBUMIN UR-MCNC: <12 MG/L
MICROALBUMIN/CREAT UR: NORMAL MG/G{CREAT}
POTASSIUM SERPL-SCNC: 5.3 MMOL/L (ref 3.4–5.3)
SODIUM SERPL-SCNC: 142 MMOL/L (ref 135–145)
TSH SERPL DL<=0.005 MIU/L-ACNC: 0.41 UIU/ML (ref 0.3–4.2)

## 2024-04-12 PROCEDURE — 80048 BASIC METABOLIC PNL TOTAL CA: CPT

## 2024-04-12 PROCEDURE — 84443 ASSAY THYROID STIM HORMONE: CPT

## 2024-04-12 PROCEDURE — 36415 COLL VENOUS BLD VENIPUNCTURE: CPT

## 2024-04-12 PROCEDURE — 82043 UR ALBUMIN QUANTITATIVE: CPT

## 2024-04-12 PROCEDURE — 85018 HEMOGLOBIN: CPT

## 2024-04-12 PROCEDURE — 82570 ASSAY OF URINE CREATININE: CPT

## 2024-04-12 NOTE — RESULT ENCOUNTER NOTE
The kidney function is improved compared to 5 months ago although remains low.  The electrolytes are normal.  The thyroid, urine protein, hemoglobin are normal

## 2024-04-18 ENCOUNTER — OFFICE VISIT (OUTPATIENT)
Dept: FAMILY MEDICINE | Facility: CLINIC | Age: 69
End: 2024-04-18
Payer: COMMERCIAL

## 2024-04-18 VITALS
WEIGHT: 146.8 LBS | RESPIRATION RATE: 14 BRPM | OXYGEN SATURATION: 98 % | HEART RATE: 70 BPM | SYSTOLIC BLOOD PRESSURE: 114 MMHG | TEMPERATURE: 97 F | BODY MASS INDEX: 26.01 KG/M2 | DIASTOLIC BLOOD PRESSURE: 78 MMHG | HEIGHT: 63 IN

## 2024-04-18 DIAGNOSIS — M79.89 LEG SWELLING: Primary | ICD-10-CM

## 2024-04-18 DIAGNOSIS — E78.2 MIXED HYPERLIPIDEMIA: ICD-10-CM

## 2024-04-18 DIAGNOSIS — I10 ESSENTIAL HYPERTENSION: ICD-10-CM

## 2024-04-18 DIAGNOSIS — E89.0 POSTABLATIVE HYPOTHYROIDISM: ICD-10-CM

## 2024-04-18 DIAGNOSIS — N18.4 CKD (CHRONIC KIDNEY DISEASE) STAGE 4, GFR 15-29 ML/MIN (H): ICD-10-CM

## 2024-04-18 DIAGNOSIS — E87.6 HYPOKALEMIA: ICD-10-CM

## 2024-04-18 PROCEDURE — G2211 COMPLEX E/M VISIT ADD ON: HCPCS | Performed by: INTERNAL MEDICINE

## 2024-04-18 PROCEDURE — 99214 OFFICE O/P EST MOD 30 MIN: CPT | Performed by: INTERNAL MEDICINE

## 2024-04-18 RX ORDER — AMLODIPINE BESYLATE 2.5 MG/1
2.5 TABLET ORAL DAILY
Qty: 90 TABLET | Refills: 3 | Status: SHIPPED | OUTPATIENT
Start: 2024-04-18 | End: 2024-05-02

## 2024-04-18 RX ORDER — POTASSIUM CHLORIDE 1500 MG/1
40 TABLET, EXTENDED RELEASE ORAL 2 TIMES DAILY
Qty: 360 TABLET | Refills: 3 | Status: SHIPPED | OUTPATIENT
Start: 2024-04-18

## 2024-04-18 RX ORDER — LEVOTHYROXINE SODIUM 100 UG/1
100 TABLET ORAL DAILY
Qty: 90 TABLET | Refills: 3 | Status: SHIPPED | OUTPATIENT
Start: 2024-04-18 | End: 2024-09-20

## 2024-04-18 RX ORDER — ATORVASTATIN CALCIUM 40 MG/1
40 TABLET, FILM COATED ORAL DAILY
Qty: 90 TABLET | Refills: 3 | Status: SHIPPED | OUTPATIENT
Start: 2024-04-18

## 2024-04-18 ASSESSMENT — ENCOUNTER SYMPTOMS: FATIGUE: 1

## 2024-04-18 ASSESSMENT — PATIENT HEALTH QUESTIONNAIRE - PHQ9
SUM OF ALL RESPONSES TO PHQ QUESTIONS 1-9: 7
SUM OF ALL RESPONSES TO PHQ QUESTIONS 1-9: 7
10. IF YOU CHECKED OFF ANY PROBLEMS, HOW DIFFICULT HAVE THESE PROBLEMS MADE IT FOR YOU TO DO YOUR WORK, TAKE CARE OF THINGS AT HOME, OR GET ALONG WITH OTHER PEOPLE: NOT DIFFICULT AT ALL

## 2024-04-18 ASSESSMENT — PAIN SCALES - GENERAL: PAINLEVEL: NO PAIN (0)

## 2024-04-18 NOTE — PATIENT INSTRUCTIONS
Chronic kidney disease  You are overdue to see a nephrologist  [facility] [phone]  Decrease amlodipine from 5 to 2.5 mg - can sometimes cause leg swelling  Hopefully the lower dose will take care of the leg swelling  If not, let me know, we can do a short course of diuretic again.  Recommend to start Jardiance 10 mg once daily  Discussed side effects of increased urination or increased bladder/vaginal infections  Let me know if expensive or side effects       Thyroid  Review HOW to take the levothyroxine as below  Check thyroid in 2 months.    Administer consistently in the morning on an empty stomach, at least 30 to 60 minutes before food. Alternatively, may consistently administer at night 3 to 4 hours after the last meal. Do not administer within 4 hours of calcium- or iron-containing products

## 2024-04-18 NOTE — PROGRESS NOTES
Assessment & Plan     Leg swelling  She does not have proteinuria.  She does have advanced CKD, but her GFR is actually improved.  Her bicarb was also improved.  Potassium is upper limit of normal.  Will decrease amlodipine in case this is directly contributing to the leg swelling.  If her leg swelling is not improved at the lower dose, would either consider stopping the amlodipine or starting a low-dose of a loop diuretic.  - Hepatic panel (Albumin, ALT, AST, Bili, Alk Phos, TP); Future    CKD (chronic kidney disease) stage 4, GFR 15-29 ml/min (H)  She is overdue to see nephrology.  She should really be seeing nephrology at least twice if not 4 times a year.  I am unsure why she has not been offered an SGLT 2 inhibitor.  We discussed this today and she is readily agreeable.  Discussed side effects.  She would like to be seen closer to home, referral placed to [facility]  Referral; Future  - empagliflozin (JARDIANCE) 10 MG TABS tablet; Take 1 tablet (10 mg) by mouth daily    Postablative hypothyroidism  Reviewed her current historic thyroid test in detail.  She is taking the levothyroxine midmorning.  Discussed appropriate way to take.  Recheck in 2 months.  She is reassured.  - levothyroxine (SYNTHROID/LEVOTHROID) 100 MCG tablet; Take 1 tablet (100 mcg) by mouth daily  - **TSH with free T4 reflex FUTURE 2mo; Future    Mixed hyperlipidemia   - stable, refill provided  - atorvastatin (LIPITOR) 40 MG tablet; Take 1 tablet (40 mg) by mouth daily    Essential hypertension  Decreased 2.5 due to leg swelling  - amLODIPine (NORVASC) 2.5 MG tablet; Take 1 tablet (2.5 mg) by mouth daily    Hypokalemia  Continue current med, may need to decrease if potassium increases further  - potassium chloride ER (K-TAB) 20 MEQ CR tablet; Take 2 tablets (40 mEq) by mouth 2 times daily      Patient Instructions   Chronic kidney disease  You are overdue to see a nephrologist  [facility] location [phone]  [phone]  Decrease amlodipine from 5 to 2.5 mg - can sometimes cause leg swelling  Hopefully the lower dose will take care of the leg swelling  If not, let me know, we can do a short course of diuretic again.  Recommend to start Jardiance 10 mg once daily  Discussed side effects of increased urination or increased bladder/vaginal infections  Let me know if expensive or side effects       Thyroid  Review HOW to take the levothyroxine as below  Check thyroid in 2 months.    Administer consistently in the morning on an empty stomach, at least 30 to 60 minutes before food. Alternatively, may consistently administer at night 3 to 4 hours after the last meal. Do not administer within 4 hours of calcium- or iron-containing products      Norma Laughlin is a 68 year old, presenting for the following health issues:  Swelling (Leg swelling, has noticed weight gain. ) and Fatigue        4/18/2024     7:04 AM   Additional Questions   Roomed by Clare JENNINGS   Accompanied by self         4/18/2024     7:04 AM   Patient Reported Additional Medications   Patient reports taking the following new medications no new meds     Discuss flu shot, denied other vaccines today.     History of Present Illness       Hypothyroidism:     Since last visit, patient describes the following symptoms::  Fatigue and Weight gain    Weight gain::  16-20 lbs.    She eats 2-3 servings of fruits and vegetables daily.She consumes 0 sweetened beverage(s) daily.She exercises with enough effort to increase her heart rate 30 to 60 minutes per day.  She exercises with enough effort to increase her heart rate 4 days per week.   She is taking medications regularly.     Hyperlipidemia Follow-Up    Are you regularly taking any medication or supplement to lower your cholesterol?   Yes- atorvastatin   Are you having muscle aches or other side effects that you think could be caused b your cholesterol lowering medication?  No, occasionally     Hypertension Follow-up    Do  you check your blood pressure regularly outside of the clinic? No   Are you following a low salt diet? Yes  Are your blood pressures ever more than 140 on the top number (systolic) OR more   than 90 on the bottom number (diastolic), for example 140/90? No     Pain History:  When did you first notice your pain? About a month    Have you seen anyone else for your pain? No  How has your pain affected your ability to work? 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 Musculoskeletal problem/pain  Onset/Duration: about a month   Description  Location: leg and feet- bilateral  Joint Swelling: YES-  swelling into feet and going up into thighs. Has been eating better and getting exercise in with weights, floor exercises, and walks on treadmill. Has noticed a fast weight gain.  Redness: YES, feels when pushing on them that you can see fluid   Pain: YES- slightly   Warmth: YES  Intensity:  mild  Progression of Symptoms:  worsening  Accompanying signs and symptoms:   Fevers: YES- gets more hot and sweating more then usual   Numbness/tingling/weakness: No  History  Trauma to the area: No, left foot toe nail is black and cracking on foot   Recent illness:  No  Previous similar problem: YES- few months ago and took water pills   Previous evaluation:  YES-  couple months ago did a EKG and it came back fine   Precipitating or alleviating factors:  Aggravating factors include: none  Therapies tried and outcome: ice, elevation, wearing tighter pants to help with circulation   -- She has not seen nephrology since June 2023.  She denies proteinuria at that time   --she has a history of acute kidney failure needing hospitalization, due to ATN  --She is not on an ACE or ARB due to URI in the past.  --I saw her for leg swelling in October.  We did a brief torsemide course.    --Her echocardiogram was normal  --She has never been on an SGLT  "2 inhibitor  --stopped smoking 1/1/24  --legs are very swollen by the end of the day    Thyroid   --wonders if her thyroid dose needs to be adjusted  --she is taking levothyroxine mid-morning        Wt Readings from Last 5 Encounters:   04/18/24 66.6 kg (146 lb 12.8 oz)   10/11/23 62.8 kg (138 lb 6.4 oz)   10/05/23 66.3 kg (146 lb 3.2 oz)   07/28/23 59.4 kg (130 lb 14.4 oz)   05/06/22 58.2 kg (128 lb 4.8 oz)           Current Outpatient Medications   Medication Sig Dispense Refill    amLODIPine (NORVASC) 5 MG tablet Take 1 tablet (5 mg) by mouth daily 90 tablet 3    atorvastatin (LIPITOR) 40 MG tablet Take 1 tablet (40 mg) by mouth daily 90 tablet 3    levothyroxine (SYNTHROID/LEVOTHROID) 100 MCG tablet Take 1 tablet (100 mcg) by mouth daily 90 tablet 0    potassium chloride ER (K-TAB) 20 MEQ CR tablet Take 2 tablets by mouth twice daily 360 tablet 1    sodium bicarbonate 650 MG tablet Take 3 tablets (1,950 mg) by mouth 2 times daily      ACE/ARB/ARNI NOT PRESCRIBED (INTENTIONAL) Please choose reason not prescribed from choices below.      ASPIRIN NOT PRESCRIBED (INTENTIONAL) Please choose reason not prescribed from choices below.      torsemide (DEMADEX) 10 MG tablet Take 1 tablet (10 mg) by mouth daily 30 tablet 0           Review of Systems  Constitutional, neuro, ENT, endocrine, pulmonary, cardiac, gastrointestinal, genitourinary, musculoskeletal, integument and psychiatric systems are negative, except as otherwise noted.      Objective    /78   Pulse 70   Temp 97  F (36.1  C) (Tympanic)   Resp 14   Ht 1.6 m (5' 2.99\")   Wt 66.6 kg (146 lb 12.8 oz)   LMP  (LMP Unknown)   SpO2 98%   BMI 26.01 kg/m    Body mass index is 26.01 kg/m .  Physical Exam   GENERAL: alert and no distress  RESP: lungs clear to auscultation - no rales, rhonchi or wheezes  CV: regular rate and rhythm, normal S1 S2, no S3 or S4, no murmur, click or rub,   LYMPH: trace thigh swelling, trace ankle swelling              The " longitudinal plan of care for the diagnosis(es)/condition(s) as documented were addressed during this visit. Due to the added complexity in care, I will continue to support Qian in the subsequent management and with ongoing continuity of care.    Signed Electronically by: Kim Mcneil DO

## 2024-04-29 ENCOUNTER — TRANSFERRED RECORDS (OUTPATIENT)
Dept: HEALTH INFORMATION MANAGEMENT | Facility: CLINIC | Age: 69
End: 2024-04-29
Payer: COMMERCIAL

## 2024-05-01 ENCOUNTER — MYC MEDICAL ADVICE (OUTPATIENT)
Dept: FAMILY MEDICINE | Facility: CLINIC | Age: 69
End: 2024-05-01
Payer: COMMERCIAL

## 2024-05-01 DIAGNOSIS — M79.89 LEG SWELLING: Primary | ICD-10-CM

## 2024-05-02 RX ORDER — FUROSEMIDE 20 MG
20 TABLET ORAL DAILY
Qty: 5 TABLET | Refills: 0 | Status: SHIPPED | OUTPATIENT
Start: 2024-05-02 | End: 2024-05-07

## 2024-05-02 NOTE — TELEPHONE ENCOUNTER
Routed to provider.  Please see MyChart message from pt reporting continued leg swelling and weight gain.  Pt seen 4/18/24 with alternate plan noted if leg swelling not improved.  Please advise.  Thank you.    Vee Bond RN

## 2024-05-09 ENCOUNTER — TRANSFERRED RECORDS (OUTPATIENT)
Dept: HEALTH INFORMATION MANAGEMENT | Facility: CLINIC | Age: 69
End: 2024-05-09
Payer: COMMERCIAL

## 2024-05-20 ENCOUNTER — TRANSFERRED RECORDS (OUTPATIENT)
Dept: HEALTH INFORMATION MANAGEMENT | Facility: CLINIC | Age: 69
End: 2024-05-20
Payer: COMMERCIAL

## 2024-05-21 ENCOUNTER — TRANSFERRED RECORDS (OUTPATIENT)
Dept: HEALTH INFORMATION MANAGEMENT | Facility: CLINIC | Age: 69
End: 2024-05-21
Payer: COMMERCIAL

## 2024-05-28 ENCOUNTER — PATIENT OUTREACH (OUTPATIENT)
Dept: CARE COORDINATION | Facility: CLINIC | Age: 69
End: 2024-05-28
Payer: COMMERCIAL

## 2024-06-25 ENCOUNTER — PATIENT OUTREACH (OUTPATIENT)
Dept: CARE COORDINATION | Facility: CLINIC | Age: 69
End: 2024-06-25
Payer: COMMERCIAL

## 2024-09-07 ENCOUNTER — HEALTH MAINTENANCE LETTER (OUTPATIENT)
Age: 69
End: 2024-09-07

## 2024-09-08 DIAGNOSIS — N18.4 CKD (CHRONIC KIDNEY DISEASE) STAGE 4, GFR 15-29 ML/MIN (H): ICD-10-CM

## 2024-09-10 RX ORDER — EMPAGLIFLOZIN 10 MG/1
10 TABLET, FILM COATED ORAL DAILY
Qty: 90 TABLET | Refills: 1 | Status: SHIPPED | OUTPATIENT
Start: 2024-09-10

## 2024-09-10 NOTE — TELEPHONE ENCOUNTER
Requested Prescriptions   Pending Prescriptions Disp Refills    JARDIANCE 10 MG TABS tablet [Pharmacy Med Name: Jardiance 10 MG Oral Tablet] 90 tablet 0     Sig: Take 1 tablet by mouth once daily       Sodium Glucose Co-Transport Inhibitor Agents Failed - 9/8/2024  8:49 AM        Failed - Has GFR on file in past 12 months and most recent value is >30   GFR Estimate   Date Value Ref Range Status   04/12/2024 27 (L) >60 mL/min/1.73m2 Final   12/03/2020 33 (L) >60 mL/min/1.73m2 Final   11/14/2011 28 (L) >60 mL/min/1.7m2 Final     GFR, ESTIMATED POCT   Date Value Ref Range Status   12/31/2021 26 (L) >60 mL/min/1.73m2 Final          Passed - Patient has documented A1c within the specified period of time.     If HgbA1C is 8 or greater, it needs to be on file within the past 3 months.  If less than 8, must be on file within the past 6 months.     No lab results found.          Passed - Medication is active on med list        Passed - Recent (6 mo) or future (90 days) visit within the authorizing provider's specialty     The patient must have completed an in-person or virtual visit within the past 6 months or has a future visit scheduled within the next 90 days with the authorizing provider s specialty.  Urgent care and e-visits do not quality as an office visit for this protocol.          Passed - Medication indicated for associated diagnosis     Medication is associated with one or more of the following diagnoses:     Diabetic nephropathy, With Albuminuria - Type 2 diabetes mellitus     Disorder of cardiovascular system; Prophylaxis - Type 2 diabetes mellitus     Type 2 diabetes mellitus    Disorder of cardiovascular system; Prophylaxis - Heart failure   Chronic kidney disease, (At risk of progression) to reduce the risk of sustained   estimated GFR decline, end-stage kidney disease, cardiovascular death,   and hospitalization for heart failure     Heart failure, (NYHA class II to IV, reduced ejection fraction) to reduce  risk of  cardiovascular death and hospitalization           Passed - Patient is age 18 or older        Passed - Patient is not pregnant        Passed - Patient has documented normal Potassium within the last 12 mos.     Recent Labs   Lab Test 04/12/24  0756   POTASSIUM 5.3             Passed - Patient has no positive pregnancy test within the past 12 mos.

## 2024-09-18 ENCOUNTER — LAB (OUTPATIENT)
Dept: LAB | Facility: CLINIC | Age: 69
End: 2024-09-18
Payer: COMMERCIAL

## 2024-09-18 DIAGNOSIS — N25.81 SECONDARY RENAL HYPERPARATHYROIDISM (H): ICD-10-CM

## 2024-09-18 DIAGNOSIS — N18.4 CKD (CHRONIC KIDNEY DISEASE) STAGE 4, GFR 15-29 ML/MIN (H): ICD-10-CM

## 2024-09-18 DIAGNOSIS — N18.4 CHRONIC KIDNEY DISEASE, STAGE IV (SEVERE) (H): ICD-10-CM

## 2024-09-18 DIAGNOSIS — N25.81 SECONDARY HYPERPARATHYROIDISM, RENAL (H): ICD-10-CM

## 2024-09-18 DIAGNOSIS — N18.32 STAGE 3B CHRONIC KIDNEY DISEASE (H): Primary | ICD-10-CM

## 2024-09-18 DIAGNOSIS — N18.4 CKD (CHRONIC KIDNEY DISEASE) STAGE 4, GFR 15-29 ML/MIN (H): Primary | ICD-10-CM

## 2024-09-18 DIAGNOSIS — R80.9 PROTEINURIA, UNSPECIFIED TYPE: ICD-10-CM

## 2024-09-18 DIAGNOSIS — R80.9 PROTEINURIA: ICD-10-CM

## 2024-09-18 DIAGNOSIS — E89.0 POSTABLATIVE HYPOTHYROIDISM: ICD-10-CM

## 2024-09-18 DIAGNOSIS — M79.89 LEG SWELLING: ICD-10-CM

## 2024-09-18 DIAGNOSIS — N18.4 CHRONIC KIDNEY DISEASE, STAGE IV (SEVERE) (H): Primary | ICD-10-CM

## 2024-09-18 LAB
ALBUMIN MFR UR ELPH: 33.4 MG/DL
ALBUMIN SERPL BCG-MCNC: 4.5 G/DL (ref 3.5–5.2)
ALBUMIN SERPL BCG-MCNC: 4.6 G/DL (ref 3.5–5.2)
ALBUMIN UR-MCNC: ABNORMAL MG/DL
ALP SERPL-CCNC: 64 U/L (ref 40–150)
ALT SERPL W P-5'-P-CCNC: 22 U/L (ref 0–50)
ANION GAP SERPL CALCULATED.3IONS-SCNC: 16 MMOL/L (ref 7–15)
APPEARANCE UR: CLEAR
AST SERPL W P-5'-P-CCNC: 29 U/L (ref 0–45)
BACTERIA #/AREA URNS HPF: ABNORMAL /HPF
BILIRUB DIRECT SERPL-MCNC: <0.2 MG/DL (ref 0–0.3)
BILIRUB SERPL-MCNC: 0.3 MG/DL
BILIRUB UR QL STRIP: NEGATIVE
BUN SERPL-MCNC: 30.7 MG/DL (ref 8–23)
CALCIUM SERPL-MCNC: 9.7 MG/DL (ref 8.8–10.4)
CHLORIDE SERPL-SCNC: 100 MMOL/L (ref 98–107)
COLOR UR AUTO: YELLOW
CREAT SERPL-MCNC: 1.85 MG/DL (ref 0.51–0.95)
CREAT UR-MCNC: 22.5 MG/DL
EGFRCR SERPLBLD CKD-EPI 2021: 29 ML/MIN/1.73M2
FERRITIN SERPL-MCNC: 135 NG/ML (ref 11–328)
GLUCOSE SERPL-MCNC: 85 MG/DL (ref 70–99)
GLUCOSE UR STRIP-MCNC: 500 MG/DL
HCO3 SERPL-SCNC: 21 MMOL/L (ref 22–29)
HGB BLD-MCNC: 15.2 G/DL (ref 11.7–15.7)
HGB UR QL STRIP: NEGATIVE
IRON BINDING CAPACITY (ROCHE): 294 UG/DL (ref 240–430)
IRON SATN MFR SERPL: 29 % (ref 15–46)
IRON SERPL-MCNC: 85 UG/DL (ref 37–145)
KETONES UR STRIP-MCNC: NEGATIVE MG/DL
LEUKOCYTE ESTERASE UR QL STRIP: NEGATIVE
NITRATE UR QL: NEGATIVE
PH UR STRIP: 5.5 [PH] (ref 5–7)
PHOSPHATE SERPL-MCNC: 2.2 MG/DL (ref 2.5–4.5)
POTASSIUM SERPL-SCNC: 4.2 MMOL/L (ref 3.4–5.3)
PROT SERPL-MCNC: 7.5 G/DL (ref 6.4–8.3)
PROT/CREAT 24H UR: 1.48 MG/MG CR (ref 0–0.2)
PTH-INTACT SERPL-MCNC: 63 PG/ML (ref 15–65)
RBC #/AREA URNS AUTO: ABNORMAL /HPF
SODIUM SERPL-SCNC: 137 MMOL/L (ref 135–145)
SP GR UR STRIP: 1.01 (ref 1–1.03)
SQUAMOUS #/AREA URNS AUTO: ABNORMAL /LPF
T4 FREE SERPL-MCNC: 1.01 NG/DL (ref 0.9–1.7)
TSH SERPL DL<=0.005 MIU/L-ACNC: 32.95 UIU/ML (ref 0.3–4.2)
UROBILINOGEN UR STRIP-ACNC: 0.2 E.U./DL
VIT D+METAB SERPL-MCNC: 56 NG/ML (ref 20–50)
WBC #/AREA URNS AUTO: ABNORMAL /HPF

## 2024-09-18 PROCEDURE — 84156 ASSAY OF PROTEIN URINE: CPT

## 2024-09-18 PROCEDURE — 83970 ASSAY OF PARATHORMONE: CPT

## 2024-09-18 PROCEDURE — 36415 COLL VENOUS BLD VENIPUNCTURE: CPT

## 2024-09-18 PROCEDURE — 83550 IRON BINDING TEST: CPT

## 2024-09-18 PROCEDURE — 82728 ASSAY OF FERRITIN: CPT

## 2024-09-18 PROCEDURE — 83540 ASSAY OF IRON: CPT

## 2024-09-18 PROCEDURE — 84443 ASSAY THYROID STIM HORMONE: CPT

## 2024-09-18 PROCEDURE — 84100 ASSAY OF PHOSPHORUS: CPT

## 2024-09-18 PROCEDURE — 82248 BILIRUBIN DIRECT: CPT

## 2024-09-18 PROCEDURE — 80053 COMPREHEN METABOLIC PANEL: CPT

## 2024-09-18 PROCEDURE — 81001 URINALYSIS AUTO W/SCOPE: CPT

## 2024-09-18 PROCEDURE — 85018 HEMOGLOBIN: CPT

## 2024-09-18 PROCEDURE — 84166 PROTEIN E-PHORESIS/URINE/CSF: CPT | Performed by: PATHOLOGY

## 2024-09-18 PROCEDURE — 82306 VITAMIN D 25 HYDROXY: CPT

## 2024-09-18 PROCEDURE — 84439 ASSAY OF FREE THYROXINE: CPT

## 2024-09-20 DIAGNOSIS — N18.4 CKD (CHRONIC KIDNEY DISEASE) STAGE 4, GFR 15-29 ML/MIN (H): Primary | ICD-10-CM

## 2024-09-20 DIAGNOSIS — I10 HYPERTENSION, UNSPECIFIED TYPE: ICD-10-CM

## 2024-09-20 DIAGNOSIS — E87.6 HYPOKALEMIA: ICD-10-CM

## 2024-09-20 DIAGNOSIS — R80.9 PROTEINURIA, UNSPECIFIED TYPE: ICD-10-CM

## 2024-09-20 DIAGNOSIS — E89.0 POSTABLATIVE HYPOTHYROIDISM: ICD-10-CM

## 2024-09-20 LAB
ALBUMIN MFR UR ELPH: 77.4 %
ALPHA1 GLOB MFR UR ELPH: 4.6 %
ALPHA2 GLOB MFR UR ELPH: 5.1 %
B-GLOBULIN MFR UR ELPH: 5.3 %
GAMMA GLOB MFR UR ELPH: 7.6 %
LOCATION OF TASK: ABNORMAL
M PROTEIN MFR UR ELPH: 0 %
PROT PATTERN UR ELPH-IMP: ABNORMAL

## 2024-09-20 RX ORDER — LEVOTHYROXINE SODIUM 112 UG/1
112 TABLET ORAL DAILY
Qty: 90 TABLET | Refills: 1 | Status: SHIPPED | OUTPATIENT
Start: 2024-09-20

## 2024-12-04 ENCOUNTER — MYC MEDICAL ADVICE (OUTPATIENT)
Dept: FAMILY MEDICINE | Facility: CLINIC | Age: 69
End: 2024-12-04
Payer: COMMERCIAL

## 2024-12-04 DIAGNOSIS — E78.2 MIXED HYPERLIPIDEMIA: ICD-10-CM

## 2024-12-04 NOTE — TELEPHONE ENCOUNTER
Sent [employer] message to Leticia Valiente in regards to their atorvastatin being overdue for refill. Per our records last filled 7/18/24 for 90 day supply and is 48 days late to refill.      Lynda Carter, PharmD, [facility]  Population Health Pharmacist  [phone]

## 2024-12-13 NOTE — TELEPHONE ENCOUNTER
Attempted to reach Leticia FINCH Steffanie in regards to their atorvastatin being overdue for refill. Left voicemail, with call back number, requesting return call. Per our records last filled 7/18/24 for 90 day supply and is 57 days late to refill.      Lynda Carter, PharmD, [facility]  Population Health Pharmacist  [phone]

## 2024-12-18 RX ORDER — ATORVASTATIN CALCIUM 40 MG/1
40 TABLET, FILM COATED ORAL DAILY
Qty: 100 TABLET | Refills: 0 | Status: SHIPPED | OUTPATIENT
Start: 2024-12-18

## 2024-12-18 NOTE — TELEPHONE ENCOUNTER
Attempted to reach Leticia FINCH Steffanie in regards to their atorvastatin being overdue for refill. Left voicemail, with call back number, requesting return call. Per our records last filled 7/18/24 for 90 day supply and is 62 days late to refill.      Patient has [facility] coverage and is part of Medicare Part-D Low Income Subsidy program. With this program, the patient is eligible to get certain prescriptions as a 100-day supply at the 30-day prescription supply cost.      Prescriptions updated to 100-day supply per protocol: atorvastatin       Lynda Carter, PharmD, BCACP  Population Health Pharmacist  [phone]

## 2024-12-20 ENCOUNTER — HOSPITAL ENCOUNTER (OUTPATIENT)
Dept: MAMMOGRAPHY | Facility: CLINIC | Age: 69
Discharge: HOME | End: 2024-12-20
Attending: INTERNAL MEDICINE | Admitting: INTERNAL MEDICINE
Payer: COMMERCIAL

## 2024-12-20 DIAGNOSIS — Z12.31 VISIT FOR SCREENING MAMMOGRAM: ICD-10-CM

## 2024-12-20 PROCEDURE — 77067 SCR MAMMO BI INCL CAD: CPT

## 2024-12-20 PROCEDURE — 77063 BREAST TOMOSYNTHESIS BI: CPT

## 2025-01-06 ENCOUNTER — TELEPHONE (OUTPATIENT)
Dept: FAMILY MEDICINE | Facility: CLINIC | Age: 70
End: 2025-01-06
Payer: COMMERCIAL

## 2025-01-06 NOTE — TELEPHONE ENCOUNTER
Patient Quality Outreach    Patient is due for the following:   Colon Cancer Screening    Action(s) Taken:   Schedule a Annual Wellness Visit    Type of outreach:    Sent letter.    Questions for provider review:    None           Janet Perez CMA

## 2025-01-06 NOTE — LETTER
January 6, 2025    To  Leticia Valiente  [id number]  [place] [address]    Your team at [facility] cares about your health. We have reviewed your chart and based on our findings; we are making the following recommendations to better manage your health.     You are in particular need of attention regarding the following:     Call or MyChart message your clinic to schedule a colonoscopy, schedule/ a FIT Test, or order a Cologuard test. If you are unsure what type of test you need, please call your clinic and speak to clinic staff.   Colon cancer is now the second leading cause of cancer-related deaths in the [place] for both men and women and there are over 130,000 new cases and 50,000 deaths per year from colon cancer. Colonoscopies can prevent 90-95% of these deaths. Problem lesions can be removed before they ever become cancer. This test is not only looking for cancer, but also getting rid of precancerous lesions.   PREVENTATIVE VISIT: Annual Medicare Wellness:Schedule an Annual Medicare Wellness Exam. Please call your [facility] clinic to set up your appointment.    If you have already completed these items, please contact the clinic via phone or   MyChart so your care team can review and update your records. Thank you for   choosing [facility]ics for your healthcare needs. For any questions,   concerns, or to schedule an appointment please contact our clinic.    Healthy Regards,      Your [facility] Care Team            Electronically signed

## 2025-01-16 ENCOUNTER — LAB (OUTPATIENT)
Dept: LAB | Facility: CLINIC | Age: 70
End: 2025-01-16
Payer: COMMERCIAL

## 2025-01-16 DIAGNOSIS — N18.4 CKD (CHRONIC KIDNEY DISEASE) STAGE 4, GFR 15-29 ML/MIN (H): ICD-10-CM

## 2025-01-16 DIAGNOSIS — R80.9 PROTEINURIA, UNSPECIFIED TYPE: ICD-10-CM

## 2025-01-16 DIAGNOSIS — E89.0 POSTABLATIVE HYPOTHYROIDISM: ICD-10-CM

## 2025-01-16 DIAGNOSIS — R80.9 PROTEINURIA: ICD-10-CM

## 2025-01-16 LAB
ALBUMIN MFR UR ELPH: 17.2 MG/DL
CREAT UR-MCNC: 54 MG/DL
PROT/CREAT 24H UR: 0.32 MG/MG CR (ref 0–0.2)
TOTAL PROTEIN SERUM FOR ELP: 7.1 G/DL (ref 6.4–8.3)
TSH SERPL DL<=0.005 MIU/L-ACNC: 0.45 UIU/ML (ref 0.3–4.2)

## 2025-01-21 LAB
ALDOST/RENIN PLAS-RTO: 21.8 {RATIO} (ref 0–25)
RENIN PLAS-CCNC: 1.7 NG/ML/HR

## 2025-01-24 PROBLEM — E87.6 HYPOKALEMIA: Status: ACTIVE | Noted: 2025-01-24

## 2025-01-28 ENCOUNTER — INFUSION THERAPY VISIT (OUTPATIENT)
Dept: INFUSION THERAPY | Facility: CLINIC | Age: 70
End: 2025-01-28
Attending: PHYSICIAN ASSISTANT
Payer: COMMERCIAL

## 2025-01-28 VITALS
TEMPERATURE: 97.8 F | SYSTOLIC BLOOD PRESSURE: 148 MMHG | RESPIRATION RATE: 18 BRPM | HEART RATE: 82 BPM | DIASTOLIC BLOOD PRESSURE: 92 MMHG

## 2025-01-28 DIAGNOSIS — I10 ESSENTIAL HYPERTENSION: Primary | ICD-10-CM

## 2025-01-28 DIAGNOSIS — I10 ESSENTIAL HYPERTENSION: ICD-10-CM

## 2025-01-28 DIAGNOSIS — E87.6 HYPOKALEMIA: Primary | ICD-10-CM

## 2025-01-28 DIAGNOSIS — E87.6 HYPOKALEMIA: ICD-10-CM

## 2025-01-28 LAB
ALBUMIN SERPL BCG-MCNC: 4.7 G/DL (ref 3.5–5.2)
ANION GAP SERPL CALCULATED.3IONS-SCNC: 18 MMOL/L (ref 7–15)
BUN SERPL-MCNC: 23.5 MG/DL (ref 8–23)
CALCIUM SERPL-MCNC: 10.1 MG/DL (ref 8.8–10.4)
CHLORIDE SERPL-SCNC: 98 MMOL/L (ref 98–107)
CREAT SERPL-MCNC: 1.49 MG/DL (ref 0.51–0.95)
EGFRCR SERPLBLD CKD-EPI 2021: 38 ML/MIN/1.73M2
GLUCOSE SERPL-MCNC: 77 MG/DL (ref 70–99)
HCO3 SERPL-SCNC: 22 MMOL/L (ref 22–29)
HOLD SPECIMEN: NORMAL
HOLD SPECIMEN: NORMAL
PHOSPHATE SERPL-MCNC: 3.1 MG/DL (ref 2.5–4.5)
POTASSIUM SERPL-SCNC: 4.3 MMOL/L (ref 3.4–5.3)
SODIUM SERPL-SCNC: 138 MMOL/L (ref 135–145)

## 2025-01-28 PROCEDURE — 82040 ASSAY OF SERUM ALBUMIN: CPT | Performed by: PHYSICIAN ASSISTANT

## 2025-01-28 PROCEDURE — 96361 HYDRATE IV INFUSION ADD-ON: CPT

## 2025-01-28 PROCEDURE — 84244 ASSAY OF RENIN: CPT | Performed by: PHYSICIAN ASSISTANT

## 2025-01-28 PROCEDURE — 82088 ASSAY OF ALDOSTERONE: CPT | Performed by: PHYSICIAN ASSISTANT

## 2025-01-28 PROCEDURE — 36415 COLL VENOUS BLD VENIPUNCTURE: CPT | Performed by: PHYSICIAN ASSISTANT

## 2025-01-28 PROCEDURE — 258N000003 HC RX IP 258 OP 636: Performed by: PHYSICIAN ASSISTANT

## 2025-01-28 PROCEDURE — 96360 HYDRATION IV INFUSION INIT: CPT

## 2025-01-28 RX ORDER — HEPARIN SODIUM,PORCINE 10 UNIT/ML
5-20 VIAL (ML) INTRAVENOUS DAILY PRN
Status: CANCELLED | OUTPATIENT
Start: 2025-01-28

## 2025-01-28 RX ORDER — ALBUTEROL SULFATE 0.83 MG/ML
2.5 SOLUTION RESPIRATORY (INHALATION)
Status: CANCELLED | OUTPATIENT
Start: 2025-01-28

## 2025-01-28 RX ORDER — EPINEPHRINE 1 MG/ML
0.3 INJECTION, SOLUTION, CONCENTRATE INTRAVENOUS EVERY 5 MIN PRN
Status: CANCELLED | OUTPATIENT
Start: 2025-01-28

## 2025-01-28 RX ORDER — ALBUTEROL SULFATE 90 UG/1
1-2 INHALANT RESPIRATORY (INHALATION)
Status: CANCELLED
Start: 2025-01-28

## 2025-01-28 RX ORDER — HEPARIN SODIUM (PORCINE) LOCK FLUSH IV SOLN 100 UNIT/ML 100 UNIT/ML
5 SOLUTION INTRAVENOUS
Status: CANCELLED | OUTPATIENT
Start: 2025-01-28

## 2025-01-28 RX ORDER — DIPHENHYDRAMINE HYDROCHLORIDE 50 MG/ML
50 INJECTION INTRAMUSCULAR; INTRAVENOUS
Status: CANCELLED
Start: 2025-01-28

## 2025-01-28 RX ORDER — METHYLPREDNISOLONE SODIUM SUCCINATE 40 MG/ML
40 INJECTION INTRAMUSCULAR; INTRAVENOUS
Status: CANCELLED
Start: 2025-01-28

## 2025-01-28 RX ORDER — DIPHENHYDRAMINE HYDROCHLORIDE 50 MG/ML
25 INJECTION INTRAMUSCULAR; INTRAVENOUS
Status: CANCELLED
Start: 2025-01-28

## 2025-01-28 RX ORDER — MEPERIDINE HYDROCHLORIDE 25 MG/ML
25 INJECTION INTRAMUSCULAR; INTRAVENOUS; SUBCUTANEOUS
Status: CANCELLED | OUTPATIENT
Start: 2025-01-28

## 2025-01-28 RX ADMIN — SODIUM CHLORIDE 2000 ML: 9 INJECTION, SOLUTION INTRAVENOUS at 08:00

## 2025-01-28 NOTE — PROGRESS NOTES
Infusion Nursing Note:  Leticia FINCH Steffanie presents today for NS loading test.    Patient seen by provider today: No   present during visit today: Not Applicable.    Note: Patient up to the bathroom via wheelchair at 1010.    Intravenous Access:  Peripheral IV placed.    Treatment Conditions:  Not Applicable.    Post Infusion Assessment:  Patient tolerated infusion without incident.  Blood return noted pre and post infusion.  Site patent and intact, free from redness, edema or discomfort.  No evidence of extravasations.  Access discontinued per protocol.     Discharge Plan:   Discharge instructions reviewed with: Patient.  Patient and/or family verbalized understanding of discharge instructions and all questions answered.  AVS to patient via [employer].  Patient will return as directed by MD for next appointment.   Patient discharged in stable condition accompanied by: self.  Departure Mode: Ambulatory.    Clare Hickman RN

## 2025-02-14 PROBLEM — E26.09 PRIMARY ALDOSTERONISM: Status: ACTIVE | Noted: 2025-02-14

## 2025-02-21 PROBLEM — R87.610 ASCUS OF CERVIX WITH NEGATIVE HIGH RISK HPV: Status: ACTIVE | Noted: 2025-02-14

## 2025-02-25 ENCOUNTER — HOSPITAL ENCOUNTER (OUTPATIENT)
Dept: CT IMAGING | Facility: CLINIC | Age: 70
Discharge: HOME | End: 2025-02-25
Attending: PHYSICIAN ASSISTANT
Payer: COMMERCIAL

## 2025-02-25 DIAGNOSIS — E26.09 PRIMARY ALDOSTERONISM: ICD-10-CM

## 2025-02-25 LAB
CREAT BLD-MCNC: 2 MG/DL (ref 0.5–1)
EGFRCR SERPLBLD CKD-EPI 2021: 27 ML/MIN/1.73M2

## 2025-02-25 PROCEDURE — 250N000011 HC RX IP 250 OP 636: Performed by: RADIOLOGY

## 2025-02-25 PROCEDURE — 74150 CT ABDOMEN W/O CONTRAST: CPT

## 2025-02-25 PROCEDURE — 82565 ASSAY OF CREATININE: CPT

## 2025-02-25 PROCEDURE — 250N000009 HC RX 250: Performed by: RADIOLOGY

## 2025-02-25 RX ORDER — IOPAMIDOL 755 MG/ML
56 INJECTION, SOLUTION INTRAVASCULAR ONCE
Status: COMPLETED | OUTPATIENT
Start: 2025-02-25 | End: 2025-02-25

## 2025-03-09 DIAGNOSIS — N18.4 CKD (CHRONIC KIDNEY DISEASE) STAGE 4, GFR 15-29 ML/MIN (H): ICD-10-CM

## 2025-03-09 DIAGNOSIS — E89.0 POSTABLATIVE HYPOTHYROIDISM: ICD-10-CM

## 2025-03-10 ENCOUNTER — MYC REFILL (OUTPATIENT)
Dept: FAMILY MEDICINE | Facility: CLINIC | Age: 70
End: 2025-03-10
Payer: COMMERCIAL

## 2025-03-10 ENCOUNTER — MYC MEDICAL ADVICE (OUTPATIENT)
Dept: FAMILY MEDICINE | Facility: CLINIC | Age: 70
End: 2025-03-10
Payer: COMMERCIAL

## 2025-03-10 DIAGNOSIS — E89.0 POSTABLATIVE HYPOTHYROIDISM: ICD-10-CM

## 2025-03-10 DIAGNOSIS — E87.6 HYPOKALEMIA: ICD-10-CM

## 2025-03-10 DIAGNOSIS — E78.2 MIXED HYPERLIPIDEMIA: ICD-10-CM

## 2025-03-10 DIAGNOSIS — N18.4 CKD (CHRONIC KIDNEY DISEASE) STAGE 4, GFR 15-29 ML/MIN (H): ICD-10-CM

## 2025-03-10 RX ORDER — LEVOTHYROXINE SODIUM 112 UG/1
112 TABLET ORAL DAILY
Qty: 90 TABLET | Refills: 3 | OUTPATIENT
Start: 2025-03-10

## 2025-03-10 RX ORDER — ATORVASTATIN CALCIUM 40 MG/1
40 TABLET, FILM COATED ORAL DAILY
Qty: 90 TABLET | Refills: 3 | Status: SHIPPED | OUTPATIENT
Start: 2025-03-10

## 2025-03-10 RX ORDER — LEVOTHYROXINE SODIUM 112 UG/1
112 TABLET ORAL DAILY
Qty: 90 TABLET | Refills: 0 | OUTPATIENT
Start: 2025-03-10

## 2025-03-10 RX ORDER — EMPAGLIFLOZIN 10 MG/1
10 TABLET, FILM COATED ORAL DAILY
Qty: 90 TABLET | Refills: 0 | OUTPATIENT
Start: 2025-03-10

## 2025-03-10 RX ORDER — LEVOTHYROXINE SODIUM 112 UG/1
112 TABLET ORAL DAILY
Qty: 90 TABLET | Refills: 3 | Status: SHIPPED | OUTPATIENT
Start: 2025-03-10

## 2025-03-10 RX ORDER — POTASSIUM CHLORIDE 1500 MG/1
40 TABLET, EXTENDED RELEASE ORAL 2 TIMES DAILY
Qty: 360 TABLET | Refills: 3 | Status: SHIPPED | OUTPATIENT
Start: 2025-03-10

## 2025-03-10 NOTE — TELEPHONE ENCOUNTER
Pharmacy requested refills that are already active on file. Refused request to pharmacy.  
Spoke to patient who was told by her [facility] pharmacy that they do NOT have her Levothyroxine rx as sent 2-14-25.     We do show these rx's were sent to pharmacy on 2-14-25 and we do show receipt of order as well.     I called [facility] pharmacy. They had two profiles for the patient.     I called the pharmacy and they did NOT have these orders for the 4 rx's sent on 2-14-25.     I was asked to resend them electronically which I did. I also called patient to let her know what happened, as electronic transmission must have failed and I did tell patient to let us know if pharmacy doesn't have rx's ready for her tomorrow and we will then have to call them directly to the pharmacist. (Which I tried to do, but was advised to re-send them electronically by pharmacy staff).     Patient verbalized understanding. Daphne Mace RN  
Patent

## 2025-03-10 NOTE — TELEPHONE ENCOUNTER
Spoke to patient who was told by her [facility] pharmacy that they do NOT have her Levothyroxine rx as sent 2-14-25.     We do show these rx's were sent to pharmacy on 2-14-25 and we do show receipt of order as well.     I called [facility] pharmacy. They had two profiles for the patient.     I called the pharmacy and they did NOT have these orders for the 4 rx's sent on 2-14-25.     I was asked to resend them electronically which I did. I also called patient to let her know what happened, as electronic transmission must have failed and I did tell patient to let us know if pharmacy doesn't have rx's ready for her tomorrow and we will then have to call them directly to the pharmacist. (Which I tried to do, but was advised to re-send them electronically by pharmacy staff).     Patient verbalized understanding. aDphne Mace RN

## 2025-03-10 NOTE — TELEPHONE ENCOUNTER
Spoke to patient who was told by her [facility] pharmacy that they do NOT have her Levothyroxine rx as sent 2-14-25.     We do show these rx's were sent to pharmacy on 2-14-25 and we do show receipt of order as well.     I called [facility] pharmacy. They had two profiles for the patient.     I called the pharmacy and they did NOT have these orders for the 4 rx's sent on 2-14-25.     I was asked to resend them electronically which I did. I also called patient to let her know what happened, as electronic transmission must have failed and I did tell patient to let us know if pharmacy doesn't have rx's ready for her tomorrow and we will then have to call them directly to the pharmacist. (Which I tried to do, but was advised to re-send them electronically by pharmacy staff).     Patient verbalized understanding. Daphne Mace RN

## 2025-05-21 DIAGNOSIS — N18.30 CHRONIC KIDNEY DISEASE, STAGE III (MODERATE) (H): Primary | ICD-10-CM

## 2025-05-28 ENCOUNTER — RESULTS FOLLOW-UP (OUTPATIENT)
Dept: FAMILY MEDICINE | Facility: CLINIC | Age: 70
End: 2025-05-28

## 2025-05-28 ENCOUNTER — LAB (OUTPATIENT)
Dept: LAB | Facility: CLINIC | Age: 70
End: 2025-05-28
Payer: COMMERCIAL

## 2025-05-28 DIAGNOSIS — N18.4 CKD (CHRONIC KIDNEY DISEASE) STAGE 4, GFR 15-29 ML/MIN (H): ICD-10-CM

## 2025-05-28 DIAGNOSIS — N18.30 CHRONIC KIDNEY DISEASE, STAGE III (MODERATE) (H): ICD-10-CM

## 2025-05-28 LAB
ANION GAP SERPL CALCULATED.3IONS-SCNC: 14 MMOL/L (ref 7–15)
BUN SERPL-MCNC: 29.6 MG/DL (ref 8–23)
CALCIUM SERPL-MCNC: 10 MG/DL (ref 8.8–10.4)
CHLORIDE SERPL-SCNC: 105 MMOL/L (ref 98–107)
CREAT SERPL-MCNC: 1.61 MG/DL (ref 0.51–0.95)
CREAT UR-MCNC: 71 MG/DL
EGFRCR SERPLBLD CKD-EPI 2021: 34 ML/MIN/1.73M2
GLUCOSE SERPL-MCNC: 87 MG/DL (ref 70–99)
HCO3 SERPL-SCNC: 27 MMOL/L (ref 22–29)
HGB BLD-MCNC: 13.8 G/DL (ref 11.7–15.7)
MCV RBC AUTO: 97 FL (ref 78–100)
MICROALBUMIN UR-MCNC: 83 MG/L
MICROALBUMIN/CREAT UR: 116.9 MG/G CR (ref 0–25)
POTASSIUM SERPL-SCNC: 4.2 MMOL/L (ref 3.4–5.3)
SODIUM SERPL-SCNC: 146 MMOL/L (ref 135–145)

## 2025-05-28 PROCEDURE — 82043 UR ALBUMIN QUANTITATIVE: CPT

## 2025-05-28 PROCEDURE — 82570 ASSAY OF URINE CREATININE: CPT

## 2025-05-28 PROCEDURE — 36415 COLL VENOUS BLD VENIPUNCTURE: CPT

## 2025-05-28 PROCEDURE — 80048 BASIC METABOLIC PNL TOTAL CA: CPT

## 2025-05-28 PROCEDURE — 85018 HEMOGLOBIN: CPT

## 2025-06-05 DIAGNOSIS — E87.6 HYPOKALEMIA: ICD-10-CM

## 2025-06-05 RX ORDER — POTASSIUM CHLORIDE 1500 MG/1
40 TABLET, EXTENDED RELEASE ORAL 2 TIMES DAILY
Qty: 360 TABLET | Refills: 0 | OUTPATIENT
Start: 2025-06-05

## (undated) DEVICE — ESU ENDO FORCEP BX HOT

## (undated) DEVICE — ENDO SNARE POLYPECTOMY OVAL 25MM LOOP SD-240U-25

## (undated) DEVICE — ENDO FORCEP ENDOJAW BIOPSY 2.8MMX230CM FB-220U

## (undated) RX ORDER — PROPOFOL 10 MG/ML
INJECTION, EMULSION INTRAVENOUS
Status: DISPENSED
Start: 2022-01-27

## (undated) RX ORDER — LIDOCAINE HYDROCHLORIDE 10 MG/ML
INJECTION, SOLUTION EPIDURAL; INFILTRATION; INTRACAUDAL; PERINEURAL
Status: DISPENSED
Start: 2022-01-27